# Patient Record
Sex: MALE | Race: WHITE | NOT HISPANIC OR LATINO | Employment: OTHER | URBAN - METROPOLITAN AREA
[De-identification: names, ages, dates, MRNs, and addresses within clinical notes are randomized per-mention and may not be internally consistent; named-entity substitution may affect disease eponyms.]

---

## 2023-08-29 ENCOUNTER — HOSPITAL ENCOUNTER (OUTPATIENT)
Dept: DATA CONVERSION | Facility: HOSPITAL | Age: 63
Discharge: HOME | End: 2023-08-29

## 2023-08-29 DIAGNOSIS — M17.11 UNILATERAL PRIMARY OSTEOARTHRITIS, RIGHT KNEE: ICD-10-CM

## 2023-08-29 LAB
ANION GAP SERPL CALCULATED.3IONS-SCNC: 9 MMOL/L (ref 0–19)
BACTERIA UR QL AUTO: NEGATIVE
BASOPHILS # BLD AUTO: 0.06 K/UL (ref 0–0.22)
BASOPHILS NFR BLD AUTO: 0.7 % (ref 0–1)
BILIRUB UR QL STRIP.AUTO: NEGATIVE
BUN SERPL-MCNC: 10 MG/DL (ref 8–25)
BUN/CREAT SERPL: 10 RATIO (ref 8–21)
CALCIUM SERPL-MCNC: 10.3 MG/DL (ref 8.5–10.4)
CHLORIDE SERPL-SCNC: 101 MMOL/L (ref 97–107)
CLARITY UR: CLEAR
CO2 SERPL-SCNC: 30 MMOL/L (ref 24–31)
COLOR UR: YELLOW
CREAT SERPL-MCNC: 1 MG/DL (ref 0.4–1.6)
DEPRECATED RDW RBC AUTO: 44 FL (ref 37–54)
DIFFERENTIAL METHOD BLD: ABNORMAL
EOSINOPHIL # BLD AUTO: 0.13 K/UL (ref 0–0.45)
EOSINOPHIL NFR BLD: 1.5 % (ref 0–3)
ERYTHROCYTE [DISTWIDTH] IN BLOOD BY AUTOMATED COUNT: 12.5 % (ref 11.7–15)
GFR SERPL CREATININE-BSD FRML MDRD: 84 ML/MIN/1.73 M2
GLUCOSE SERPL-MCNC: 97 MG/DL (ref 65–99)
GLUCOSE UR STRIP.AUTO-MCNC: NEGATIVE MG/DL
HBA1C MFR BLD: 5.8 % (ref 4–6)
HCT VFR BLD AUTO: 48.2 % (ref 41–50)
HGB BLD-MCNC: 16 GM/DL (ref 13.5–16.5)
HGB UR QL STRIP.AUTO: 1 /HPF (ref 0–3)
HGB UR QL: NEGATIVE
HYALINE CASTS UR QL AUTO: NORMAL /LPF
IMM GRANULOCYTES # BLD AUTO: 0.1 K/UL (ref 0–0.1)
KETONES UR QL STRIP.AUTO: NEGATIVE
LEUKOCYTE ESTERASE UR QL STRIP.AUTO: NEGATIVE
LYMPHOCYTES # BLD AUTO: 2.79 K/UL (ref 1.2–3.2)
LYMPHOCYTES NFR BLD MANUAL: 31.1 % (ref 20–40)
MCH RBC QN AUTO: 31.6 PG (ref 26–34)
MCHC RBC AUTO-ENTMCNC: 33.2 % (ref 31–37)
MCV RBC AUTO: 95.3 FL (ref 80–100)
MICROSCOPIC (UA): NORMAL
MONOCYTES # BLD AUTO: 1.07 K/UL (ref 0–0.8)
MONOCYTES NFR BLD MANUAL: 11.9 % (ref 0–8)
MRSA DNA SPEC QL NAA+PROBE: NEGATIVE
NEUTROPHILS # BLD AUTO: 4.81 K/UL
NEUTROPHILS # BLD AUTO: 4.81 K/UL (ref 1.8–7.7)
NEUTROPHILS.IMMATURE NFR BLD: 1.1 % (ref 0–1)
NEUTS SEG NFR BLD: 53.7 % (ref 50–70)
NITRITE UR QL STRIP.AUTO: NEGATIVE
NRBC BLD-RTO: 0 /100 WBC
PH UR STRIP.AUTO: 7 [PH] (ref 4.6–8)
PLATELET # BLD AUTO: 249 K/UL (ref 150–450)
PMV BLD AUTO: 10 CU (ref 7–12.6)
POTASSIUM SERPL-SCNC: 4.7 MMOL/L (ref 3.4–5.1)
PROT UR STRIP.AUTO-MCNC: NEGATIVE MG/DL
RBC # BLD AUTO: 5.06 M/UL (ref 4.5–5.5)
SODIUM SERPL-SCNC: 140 MMOL/L (ref 133–145)
SP GR UR STRIP.AUTO: 1.01 (ref 1–1.03)
SPECIMEN SOURCE: NORMAL
SQUAMOUS UR QL AUTO: NORMAL /HPF
URINE CULTURE: NORMAL
UROBILINOGEN UR QL STRIP.AUTO: NORMAL MG/DL (ref 0–1)
WBC # BLD AUTO: 9 K/UL (ref 4.5–11)
WBC #/AREA URNS AUTO: NORMAL /HPF (ref 0–3)

## 2023-09-04 PROBLEM — B49 FUNGAL INFECTION: Status: ACTIVE | Noted: 2023-09-04

## 2023-09-04 PROBLEM — R52 PAIN: Status: ACTIVE | Noted: 2023-09-04

## 2023-09-04 PROBLEM — E78.5 HYPERLIPIDEMIA: Status: ACTIVE | Noted: 2023-09-04

## 2023-09-04 PROBLEM — N52.9 ERECTILE DYSFUNCTION: Status: ACTIVE | Noted: 2023-09-04

## 2023-09-04 PROBLEM — R06.89 IMPAIRED GAS EXCHANGE: Status: ACTIVE | Noted: 2023-09-04

## 2023-09-04 PROBLEM — E87.8 FLUID VOLUME DISORDER: Status: ACTIVE | Noted: 2023-09-04

## 2023-09-04 PROBLEM — K21.9 GASTROESOPHAGEAL REFLUX DISEASE: Status: ACTIVE | Noted: 2023-09-04

## 2023-09-04 PROBLEM — M25.531 RIGHT WRIST PAIN: Status: ACTIVE | Noted: 2023-09-04

## 2023-09-04 PROBLEM — I48.92 ATRIAL FLUTTER (MULTI): Status: ACTIVE | Noted: 2023-09-04

## 2023-09-04 PROBLEM — R00.2 PALPITATIONS: Status: ACTIVE | Noted: 2023-09-04

## 2023-09-04 PROBLEM — M25.569 JOINT PAIN, KNEE: Status: ACTIVE | Noted: 2023-09-04

## 2023-09-04 PROBLEM — M17.10 OSTEOARTHROSIS, LOCALIZED, PRIMARY, KNEE: Status: ACTIVE | Noted: 2023-09-04

## 2023-09-04 PROBLEM — Z86.0100 HISTORY OF COLONIC POLYPS: Status: ACTIVE | Noted: 2023-09-04

## 2023-09-04 PROBLEM — M25.819 SHOULDER IMPINGEMENT: Status: ACTIVE | Noted: 2023-09-04

## 2023-09-04 PROBLEM — I45.10 RIGHT BUNDLE BRANCH BLOCK: Status: ACTIVE | Noted: 2023-09-04

## 2023-09-04 PROBLEM — Z86.010 HISTORY OF COLONIC POLYPS: Status: ACTIVE | Noted: 2023-09-04

## 2023-09-04 PROBLEM — I10 HYPERTENSION: Status: ACTIVE | Noted: 2023-09-04

## 2023-09-04 PROBLEM — M18.9 CMC ARTHRITIS, THUMB, DEGENERATIVE: Status: ACTIVE | Noted: 2023-09-04

## 2023-09-04 PROBLEM — M17.0 PRIMARY OSTEOARTHRITIS OF BOTH KNEES: Status: ACTIVE | Noted: 2023-09-04

## 2023-09-04 RX ORDER — ZINC GLUCONATE 50 MG
1 TABLET ORAL DAILY
COMMUNITY
End: 2023-12-21 | Stop reason: WASHOUT

## 2023-09-04 RX ORDER — LOSARTAN POTASSIUM AND HYDROCHLOROTHIAZIDE 12.5; 5 MG/1; MG/1
1 TABLET ORAL DAILY
COMMUNITY

## 2023-09-04 RX ORDER — VITAMIN E MIXED 400 UNIT
1 CAPSULE ORAL DAILY
COMMUNITY
End: 2023-12-21 | Stop reason: WASHOUT

## 2023-09-04 RX ORDER — IBUPROFEN 100 MG/5ML
1 SUSPENSION, ORAL (FINAL DOSE FORM) ORAL DAILY
COMMUNITY

## 2023-09-04 RX ORDER — TRIAMCINOLONE ACETONIDE 40 MG/ML
INJECTION, SUSPENSION INTRA-ARTICULAR; INTRAMUSCULAR
COMMUNITY
Start: 2016-04-06 | End: 2023-12-21 | Stop reason: WASHOUT

## 2023-09-04 RX ORDER — SILDENAFIL 50 MG/1
1 TABLET, FILM COATED ORAL DAILY PRN
COMMUNITY
Start: 2020-03-06 | End: 2023-11-27

## 2023-09-04 RX ORDER — UREA 10 %
1 LOTION (ML) TOPICAL DAILY
COMMUNITY
End: 2023-12-21 | Stop reason: WASHOUT

## 2023-09-04 RX ORDER — UBIQUINOL 100 MG
1 CAPSULE ORAL DAILY
COMMUNITY
End: 2023-12-21 | Stop reason: WASHOUT

## 2023-09-04 RX ORDER — LORATADINE 10 MG/1
TABLET ORAL
COMMUNITY
Start: 2014-10-16 | End: 2023-12-21 | Stop reason: WASHOUT

## 2023-09-04 RX ORDER — DABIGATRAN ETEXILATE 150 MG/1
1 CAPSULE ORAL 2 TIMES DAILY
COMMUNITY
End: 2023-12-21 | Stop reason: WASHOUT

## 2023-09-04 RX ORDER — MULTIVITAMIN
1 TABLET ORAL DAILY
COMMUNITY
End: 2024-03-05 | Stop reason: ALTCHOICE

## 2023-09-04 RX ORDER — EPINEPHRINE 0.22MG
1 AEROSOL WITH ADAPTER (ML) INHALATION DAILY
COMMUNITY
End: 2023-12-21 | Stop reason: WASHOUT

## 2023-09-04 RX ORDER — PHENOL/SODIUM PHENOLATE
1 AEROSOL, SPRAY (ML) MUCOUS MEMBRANE DAILY
COMMUNITY
End: 2023-12-21 | Stop reason: WASHOUT

## 2023-09-04 RX ORDER — MELOXICAM 15 MG/1
1 TABLET ORAL DAILY PRN
COMMUNITY
End: 2024-01-24

## 2023-09-04 RX ORDER — CLOBETASOL PROPIONATE 0.5 MG/G
CREAM TOPICAL 2 TIMES DAILY
COMMUNITY
Start: 2023-07-20 | End: 2023-12-21 | Stop reason: WASHOUT

## 2023-09-04 RX ORDER — CLOTRIMAZOLE AND BETAMETHASONE DIPROPIONATE 10; .64 MG/G; MG/G
1 CREAM TOPICAL 2 TIMES DAILY
COMMUNITY
Start: 2019-11-18 | End: 2023-12-21 | Stop reason: WASHOUT

## 2023-09-04 RX ORDER — DESONIDE 0.5 MG/G
1 CREAM TOPICAL 2 TIMES DAILY PRN
COMMUNITY
End: 2023-12-21 | Stop reason: WASHOUT

## 2023-09-04 RX ORDER — TERBINAFINE HYDROCHLORIDE 250 MG/1
1 TABLET ORAL DAILY
COMMUNITY
Start: 2022-12-21 | End: 2023-12-21 | Stop reason: WASHOUT

## 2023-09-04 RX ORDER — CICLOPIROX 80 MG/ML
SOLUTION TOPICAL
COMMUNITY
End: 2023-12-21 | Stop reason: WASHOUT

## 2023-09-04 RX ORDER — OMEPRAZOLE 20 MG/1
1 CAPSULE, DELAYED RELEASE ORAL EVERY OTHER DAY
COMMUNITY
End: 2024-03-13

## 2023-09-04 RX ORDER — ACETAMINOPHEN 500 MG
1 TABLET ORAL DAILY
COMMUNITY
End: 2023-12-21 | Stop reason: WASHOUT

## 2023-09-04 RX ORDER — ATORVASTATIN CALCIUM 10 MG/1
1 TABLET, FILM COATED ORAL DAILY
COMMUNITY
Start: 2012-03-15 | End: 2024-01-24

## 2023-09-11 ENCOUNTER — HOSPITAL ENCOUNTER (OUTPATIENT)
Dept: DATA CONVERSION | Facility: HOSPITAL | Age: 63
Discharge: HOME HEALTH | End: 2023-09-12
Payer: MEDICARE

## 2023-09-11 DIAGNOSIS — I48.92 UNSPECIFIED ATRIAL FLUTTER (MULTI): ICD-10-CM

## 2023-09-11 DIAGNOSIS — K21.9 GASTRO-ESOPHAGEAL REFLUX DISEASE WITHOUT ESOPHAGITIS: ICD-10-CM

## 2023-09-11 DIAGNOSIS — E78.5 HYPERLIPIDEMIA, UNSPECIFIED: ICD-10-CM

## 2023-09-11 DIAGNOSIS — M17.11 UNILATERAL PRIMARY OSTEOARTHRITIS, RIGHT KNEE: ICD-10-CM

## 2023-09-11 DIAGNOSIS — I10 ESSENTIAL (PRIMARY) HYPERTENSION: ICD-10-CM

## 2023-09-12 LAB
ANION GAP SERPL CALCULATED.3IONS-SCNC: 10 MMOL/L (ref 0–19)
BASOPHILS # BLD AUTO: 0.04 K/UL (ref 0–0.22)
BASOPHILS NFR BLD AUTO: 0.3 % (ref 0–1)
BUN SERPL-MCNC: 13 MG/DL (ref 8–25)
BUN/CREAT SERPL: 13 RATIO (ref 8–21)
CALCIUM SERPL-MCNC: 8.4 MG/DL (ref 8.5–10.4)
CHLORIDE SERPL-SCNC: 107 MMOL/L (ref 97–107)
CO2 SERPL-SCNC: 22 MMOL/L (ref 24–31)
CREAT SERPL-MCNC: 1 MG/DL (ref 0.4–1.6)
DEPRECATED RDW RBC AUTO: 44.4 FL (ref 37–54)
DIFFERENTIAL METHOD BLD: ABNORMAL
EOSINOPHIL # BLD AUTO: 0.07 K/UL (ref 0–0.45)
EOSINOPHIL NFR BLD: 0.6 % (ref 0–3)
ERYTHROCYTE [DISTWIDTH] IN BLOOD BY AUTOMATED COUNT: 12.6 % (ref 11.7–15)
GFR SERPL CREATININE-BSD FRML MDRD: 84 ML/MIN/1.73 M2
GLUCOSE SERPL-MCNC: 98 MG/DL (ref 65–99)
HCT VFR BLD AUTO: 38.1 % (ref 41–50)
HGB BLD-MCNC: 12.6 GM/DL (ref 13.5–16.5)
IMM GRANULOCYTES # BLD AUTO: 0.1 K/UL (ref 0–0.1)
LYMPHOCYTES # BLD AUTO: 2.07 K/UL (ref 1.2–3.2)
LYMPHOCYTES NFR BLD MANUAL: 17.9 % (ref 20–40)
MCH RBC QN AUTO: 31.7 PG (ref 26–34)
MCHC RBC AUTO-ENTMCNC: 33.1 % (ref 31–37)
MCV RBC AUTO: 95.7 FL (ref 80–100)
MONOCYTES # BLD AUTO: 1.28 K/UL (ref 0–0.8)
MONOCYTES NFR BLD MANUAL: 11.1 % (ref 0–8)
NEUTROPHILS # BLD AUTO: 7.99 K/UL
NEUTROPHILS # BLD AUTO: 7.99 K/UL (ref 1.8–7.7)
NEUTROPHILS.IMMATURE NFR BLD: 0.9 % (ref 0–1)
NEUTS SEG NFR BLD: 69.2 % (ref 50–70)
NRBC BLD-RTO: 0 /100 WBC
PLATELET # BLD AUTO: 207 K/UL (ref 150–450)
PMV BLD AUTO: 10.3 CU (ref 7–12.6)
POTASSIUM SERPL-SCNC: 4.4 MMOL/L (ref 3.4–5.1)
RBC # BLD AUTO: 3.98 M/UL (ref 4.5–5.5)
SODIUM SERPL-SCNC: 139 MMOL/L (ref 133–145)
WBC # BLD AUTO: 11.6 K/UL (ref 4.5–11)

## 2023-10-02 ENCOUNTER — EVALUATION (OUTPATIENT)
Dept: PHYSICAL THERAPY | Facility: CLINIC | Age: 63
End: 2023-10-02
Payer: COMMERCIAL

## 2023-10-02 DIAGNOSIS — M25.561 ARTHRALGIA OF RIGHT KNEE: ICD-10-CM

## 2023-10-02 DIAGNOSIS — M17.11 PRIMARY LOCALIZED OSTEOARTHROSIS OF RIGHT LOWER LEG: Primary | ICD-10-CM

## 2023-10-02 PROCEDURE — 97140 MANUAL THERAPY 1/> REGIONS: CPT | Mod: GP | Performed by: PHYSICAL THERAPIST

## 2023-10-02 PROCEDURE — 97161 PT EVAL LOW COMPLEX 20 MIN: CPT | Mod: GP | Performed by: PHYSICAL THERAPIST

## 2023-10-02 PROCEDURE — 97110 THERAPEUTIC EXERCISES: CPT | Mod: GP | Performed by: PHYSICAL THERAPIST

## 2023-10-02 ASSESSMENT — ENCOUNTER SYMPTOMS
LOSS OF SENSATION IN FEET: 0
OCCASIONAL FEELINGS OF UNSTEADINESS: 0
DEPRESSION: 0

## 2023-10-02 ASSESSMENT — PAIN - FUNCTIONAL ASSESSMENT: PAIN_FUNCTIONAL_ASSESSMENT: 0-10

## 2023-10-02 NOTE — Clinical Note
October 2, 2023     Patient: Leonel Ceballos   YOB: 1960   Date of Visit: 10/2/2023       To Whom it May Concern:    Leonel Ceballos was seen in my clinic on 10/2/2023. He {Return to school/sport:36001}.    If you have any questions or concerns, please don't hesitate to call.         Sincerely,          Osei Patton, PT        CC: No Recipients

## 2023-10-02 NOTE — Clinical Note
October 2, 2023     Patient: Leonel Ceballos   YOB: 1960   Date of Visit: 10/2/2023       To Whom It May Concern:    It is my medical opinion that Leonel Ceballos {Work release (duty restriction):31973}.    If you have any questions or concerns, please don't hesitate to call.         Sincerely,        Osei Patton, PT    CC: No Recipients

## 2023-10-03 NOTE — PROGRESS NOTES
"Physical Therapy Evaluation    Patient Name: Leonel Ceballos  MRN: [id number]  Today's Date: 10/2/2023  Time Calculation  Start Time: 1025  Stop Time: 1105  Time Calculation (min): 40 min    Assessment  Rehab Prognosis: Good  Assessment Comment: Patient is 63 y.o. year old who presents to physical therapy with signs and symptoms consistent with R knee pain s/p TKA. Patient is progressing well from home physical therapy but continues to have decreased ROM and strength limiting functional mobility and ADLs. Pt would benefit from skilled physical therapy in order to address the stated deficits and return to daily tasks with reduced pain and improved function.    SINSS:  Severity: Moderate  Irritability: Moderate  Nature: MSK: Knee  Stage: Sub-Acute    Plan  Treatment/Interventions: Education/ Instruction, Electrical stimulation, Gait training, Manual therapy, Neuromuscular re-education, Self care/ home management, Therapeutic activities, Therapeutic exercises, Vasopneumatic device  PT Plan: Skilled PT  PT Frequency: 2 times per week  Duration: 8 weeks  Onset Date: 01/01/23  Certification Period Start Date: 10/02/23  Certification Period End Date: 11/27/23  Rehab Potential: Good  Plan of Care Agreement: Patient        Subjective   General:  General  Reason for Referral: R knee pain s/p R TKA  Referred By: Dr. Rodgers  General Comment: Pt reports that he had long standing knee OA that had been slowly progressing over the years. He notes that ~2 years ago the pain progressively worsened and during that time he had cortisone injections and gel injections. Ambulation with single-point cane on uneven surfaces and community.  Precautions:  Precautions  STEADI Fall Risk Score (The score of 4 or more indicates an increased risk of falling): 2  Precautions Comment: Ambulating with single-point cane    Pain:  Pain Assessment: 0-10  Pain Location: Knee (#1 (I,V): R knee diffusely, 0-5/10, \"aching\")    Objective     KNEE    Knee " Observation  Observation Comment: Generalized Joint swelling diffusely; Girth: Superior patella L: 38cm, R: 41.5cm; 15cm distal joint line: L: 38cm, R: 36cm       Knee AROM WFL unless documented below  R knee flexion: (140°): 72°  L knee flexion: (140°): 132°  R knee extension: (0°): 5°  L knee extension: (0°): 0°  Knee PROM WFL unless documented below  R knee flexion: (140°): 85°  R knee extension: (0°): 0°  Knee MMT WFL unless documented below  R knee flexion: (5/5): 4+/5  L knee flexion: (5/5): 5/5  R knee extension: (5/5): 4-/5  L knee extension: (5/5): 5/5    Outcome Measures:  Other Measures  Lower Extremity Funtional Score (LEFS): 35/80 (43.75%)     OP EDUCATION:  Education  Individual(s) Educated: Patient  Education Comment: Reviewed HEP from home health    Goals:  STG: (Expected End: 10/30/2023)  1) Patient will improve Oswestry Low Back Disability Questionnaire by 10% in order to indicate a measurable improvement in daily function and ability to complete daily tasks.  2) Patient will be able to complete ADLs with pain less than 4/10 in lumbar region.  3) Patient will have 75% of normal lumbar ROM in order to allow for proper bathing and dressing.   4) Patient will be independent with HEP to allow for continued improvement in daily tasks at home and in the community in 3 visits.   LTG: (Expected End: 11/27/2023)  1) Patient will have 5/5 strength in lateral hip musculature to aid in stability with ambulation on varied surfaces in community.  2) Patient will be able to perform proper squatting technique and sitting postures in order to prevent increased pain with daily tasks.  3) Patient will be able to perform >30 seconds of SLS on even ground in order to allow for safe ambulation and to demonstrate reduced fall risk within the community.   4) Patient will improve Oswestry Low Back Disability Questionnaire to </=15% in order to indicate a measurable improvement in daily function and ability to complete  daily tasks.

## 2023-10-06 ENCOUNTER — TREATMENT (OUTPATIENT)
Dept: PHYSICAL THERAPY | Facility: CLINIC | Age: 63
End: 2023-10-06
Payer: COMMERCIAL

## 2023-10-06 DIAGNOSIS — M25.561 ARTHRALGIA OF RIGHT KNEE: ICD-10-CM

## 2023-10-06 DIAGNOSIS — M17.11 PRIMARY LOCALIZED OSTEOARTHROSIS OF RIGHT LOWER LEG: Primary | ICD-10-CM

## 2023-10-06 PROCEDURE — 97112 NEUROMUSCULAR REEDUCATION: CPT | Mod: GP | Performed by: PHYSICAL THERAPIST

## 2023-10-06 PROCEDURE — 97110 THERAPEUTIC EXERCISES: CPT | Mod: GP | Performed by: PHYSICAL THERAPIST

## 2023-10-06 PROCEDURE — 97140 MANUAL THERAPY 1/> REGIONS: CPT | Mod: GP | Performed by: PHYSICAL THERAPIST

## 2023-10-06 ASSESSMENT — PAIN - FUNCTIONAL ASSESSMENT: PAIN_FUNCTIONAL_ASSESSMENT: 0-10

## 2023-10-06 ASSESSMENT — PAIN SCALES - GENERAL: PAINLEVEL_OUTOF10: 2

## 2023-10-06 NOTE — PROGRESS NOTES
Physical Therapy Treatment    Patient Name: Leonel Ceballos  MRN: [id number]  Today's Date: 10/6/2023     Current Problem  No diagnosis found.    Insurance:  Number of Treatments Authorized: 2 of med nec          Subjective   General  Reason for Referral: R knee pain s/p R TKA  Referred By: Dr. Rodgers  General Comment: Pt states that he's feeling pretty good overall today with some slight soreness and pain in the knee.  Precautions  Precautions  Precautions Comment: Ambulating with single-point cane  Pain  Pain Assessment: 0-10  Pain Score: 2    Objective     Knee AROM WFL unless documented below  R knee flexion: (140°): 82°  R knee extension: (0°): 3°  Knee PROM WFL unless documented below  R knee flexion: (140°): 85°  R knee extension: (0°): 0°    Treatments:    Therapeutic Exercise  Therapeutic Exercise Activity 1: SciFit (Seat 13), L1, 5 mins  Therapeutic Exercise Activity 2: Dynamics: High knees, Butt kicks, tin soldiers, hip openers, hip closers, x 40 feet each  Therapeutic Exercise Activity 3: PROM - R knee flexion/extension  Therapeutic Exercise Activity 4: Gastroc Stretch, incline, 3x30 secs  Therapeutic Exercise Activity 5: HS Stretch, foot on stool, 3x30 sec    Balance/Neuromuscular Re-Education  Balance/Neuromuscular Re-Education Activity 1: Tiltboard balance M/L 5x30 secs  Balance/Neuromuscular Re-Education Activity 2: SLS, EO, Firm    Manual Therapy  Manual Therapy Activity 1: Patellar mobilizations, All glides, Gr. III in supine    Assessment:  PT Assessment  Rehab Prognosis: Good  Assessment Comment: Patient with good tolerance to treatment today.  Demonstrating increased range of motion and tolerance to functional tasks.  Continues to be challenged with balance and CKC activities such as squats.  Experienced episode of lightheadedness during Total Gym but subsided with water and rest for approximately 2 to 3 minutes.  Was able to leave the clinic independently without assistance.  Will monitor  response to exercises in future sessions and adjust plan of care to continue to progress deficits to return to prior functional level for ADLs.    Plan:  OP PT Plan  PT Plan: Skilled PT (Progress per POC. Advance LE strengthening and ROM tasks for knee and hip)  Number of Treatments Authorized: 2 of med nec      Osei Patton, PT

## 2023-10-10 ENCOUNTER — TREATMENT (OUTPATIENT)
Dept: PHYSICAL THERAPY | Facility: CLINIC | Age: 63
End: 2023-10-10
Payer: COMMERCIAL

## 2023-10-10 DIAGNOSIS — M25.561 ARTHRALGIA OF RIGHT KNEE: Primary | ICD-10-CM

## 2023-10-10 PROCEDURE — 97110 THERAPEUTIC EXERCISES: CPT | Mod: GP | Performed by: PHYSICAL MEDICINE & REHABILITATION

## 2023-10-10 PROCEDURE — 97016 VASOPNEUMATIC DEVICE THERAPY: CPT | Mod: GP | Performed by: PHYSICAL MEDICINE & REHABILITATION

## 2023-10-10 PROCEDURE — 97140 MANUAL THERAPY 1/> REGIONS: CPT | Mod: GP | Performed by: PHYSICAL MEDICINE & REHABILITATION

## 2023-10-10 NOTE — PROGRESS NOTES
"  Physical Therapy Treatment    Patient Name: Leonel Ceballos  MRN: [id number]  Today's Date: 10/10/2023  Time Calculation  Start Time: 1205  Stop Time: 1255  Time Calculation (min): 50 min  Current Problem  1. Arthralgia of right knee            Insurance:  Visit number: 3 of MN  Authorization info: MN  Insurance Type: [facility]    Subjective   General  Reason for Referral: R knee pain s/p R TKA  Referred By: Dr. Rodgers  General Comment: Patient reports feeling some aching and soreness over the lateral surface of his right knee. He notes that this began about two days ago. No new complaints otherwise.      Performing HEP?: Yes    Precautions  Precautions  Precautions Comment: Ambulating with single-point cane    Objective   KNEE    Knee AROM WFL unless documented below  R knee flexion: (140°): 85°  R knee extension: (0°): 2°    Treatments:  Therapeutic Exercise  Therapeutic Exercise Activity 1: SciFit (Seat 13), L1, 5 mins  Therapeutic Exercise Activity 2: Gastroc Stretch, incline, 3x30 secs  Therapeutic Exercise Activity 3: Supine Heel Slides: x30 w strap  Therapeutic Exercise Activity 4: Supine quad set with heel prop: 20x5\" holds  Therapeutic Exercise Activity 5: SLR w quad set: 3x10  Therapeutic Exercise Activity 6: DL Leg Press: 2x10 w 60#  Therapeutic Exercise Activity 7: SL Leg Press: 2x10 w 30#    Manual Therapy  Manual Therapy Activity 1: Tibiofemoral Mobilization for knee flexion: Grade III  Manual Therapy Activity 2: IASTM to R quadricep and lateral knee    Modalities  Modality 1: Untimed Vasopneumatic    Assessment:  PT Assessment  Rehab Prognosis: Good  Assessment Comment: Today's session focused on progressing activities to advance patient's right knee strength, AROM and soft tissue mobility. Patient demonstrated good effort toward today's progressions. No increased pain or adverse responses noted at the conclusion of the session. Overall response toward today's interventions was good.    Plan:   Continue with " current POC. Progress AROM and strength as tolerated.      Andrew Ramsay, PT

## 2023-10-13 ENCOUNTER — TREATMENT (OUTPATIENT)
Dept: PHYSICAL THERAPY | Facility: CLINIC | Age: 63
End: 2023-10-13
Payer: COMMERCIAL

## 2023-10-13 DIAGNOSIS — M17.11 PRIMARY LOCALIZED OSTEOARTHROSIS OF RIGHT LOWER LEG: Primary | ICD-10-CM

## 2023-10-13 DIAGNOSIS — M25.561 ARTHRALGIA OF RIGHT KNEE: ICD-10-CM

## 2023-10-13 PROCEDURE — 97112 NEUROMUSCULAR REEDUCATION: CPT | Mod: GP | Performed by: PHYSICAL THERAPIST

## 2023-10-13 PROCEDURE — 97016 VASOPNEUMATIC DEVICE THERAPY: CPT | Mod: GP | Performed by: PHYSICAL THERAPIST

## 2023-10-13 PROCEDURE — 97110 THERAPEUTIC EXERCISES: CPT | Mod: GP | Performed by: PHYSICAL THERAPIST

## 2023-10-13 ASSESSMENT — PAIN SCALES - GENERAL: PAINLEVEL_OUTOF10: 2

## 2023-10-13 ASSESSMENT — PAIN - FUNCTIONAL ASSESSMENT: PAIN_FUNCTIONAL_ASSESSMENT: 0-10

## 2023-10-13 NOTE — PROGRESS NOTES
Physical Therapy Treatment    Patient Name: Leonel Ceballos  MRN: [id number]  Today's Date: 10/13/2023  Time Calculation  Start Time: 1235  Stop Time: 1330  Time Calculation (min): 55 min    Current Problem  1. Primary localized osteoarthrosis of right lower leg        2. Arthralgia of right knee            Insurance:  Number of Treatments Authorized: 4 of Med nec        Payor: [employer] DUAL COMPLETE / Plan: [employer] DUAL COMPLETE / Product Type: *No Product type* /     Subjective   General  Reason for Referral: R knee pain s/p R TKA  Referred By: Dr. Rodgers  General Comment: Pt notes that he continues to have improvement overall in tolerance to functional tasks in WBing but is still having intermittent soreness and pain when trying to sleep at night.    Performing HEP?: Yes    Precautions  Precautions  Precautions Comment: Ambulating with single-point cane  Pain  Pain Assessment: 0-10  Pain Score: 2    Objective   KNEE    Knee AROM WFL unless documented below  R knee flexion: (140°): 102°  R knee extension: (0°): 2°    Treatments:    Therapeutic Exercise  Therapeutic Exercise Activity 1: SciFit (Seat 13), L1, 5 mins  Therapeutic Exercise Activity 2: Gastroc Stretch, incline, 3x30 secs  Therapeutic Exercise Activity 3: Dynamics: High knees, Butt kicks, tin soldiers, hip openers, hip closers, x 40 feet each  Therapeutic Exercise Activity 4: DL Leg Press: 2x10 w 60#  Therapeutic Exercise Activity 5: SL Leg Press: 2x10 w 30#  Therapeutic Exercise Activity 6: PROM R knee flexion/extension    Balance/Neuromuscular Re-Education  Balance/Neuromuscular Re-Education Activity 1: Tiltboard balance M/L 5x30 secs  Balance/Neuromuscular Re-Education Activity 2: SLS L, EO,AirEx, 15 secs x 10  Balance/Neuromuscular Re-Education Activity 3: R/L lateral steps with green loop x 3 laps (15 steps each direction = 1 lap)  Balance/Neuromuscular Re-Education Activity 4: F/B diagonal steps with green loop x 3 laps (15 steps  each direction = 1 lap)    Manual Therapy  Manual Therapy Activity 1: Patellar mobilizations, All glides, Gr. III in supine      Modalities  Modality 1: Untimed Vasopneumatic (GameReady, 34°, moderate pressure, bell given)    Assessment:  PT Assessment  Rehab Prognosis: Good  Assessment Comment: Patient with good tolerance to session today despite being challenged with strengthening and weightbearing activities.  Patient with increased fatigue and soreness (5-6/10) noted along with decreased pace during exercises as session progressed.  Positive response to vasopneumatic with cryotherapy at end of session.  We will continue to challenge CKC strength and balance in bilateral and unilateral positions to assist with return to ADLs.    Plan:     PT Plan: Skilled PT (Progress per POC. Advance LE strengthening and ROM tasks for knee and hip)      Osei Patton, PT

## 2023-10-17 ENCOUNTER — TREATMENT (OUTPATIENT)
Dept: PHYSICAL THERAPY | Facility: CLINIC | Age: 63
End: 2023-10-17
Payer: COMMERCIAL

## 2023-10-17 DIAGNOSIS — M17.11 PRIMARY LOCALIZED OSTEOARTHROSIS OF RIGHT LOWER LEG: Primary | ICD-10-CM

## 2023-10-17 DIAGNOSIS — M25.561 ARTHRALGIA OF RIGHT KNEE: ICD-10-CM

## 2023-10-17 DIAGNOSIS — M17.0 PRIMARY OSTEOARTHRITIS OF BOTH KNEES: ICD-10-CM

## 2023-10-17 PROCEDURE — 97110 THERAPEUTIC EXERCISES: CPT | Mod: GP | Performed by: PHYSICAL MEDICINE & REHABILITATION

## 2023-10-17 PROCEDURE — 97016 VASOPNEUMATIC DEVICE THERAPY: CPT | Mod: GP | Performed by: PHYSICAL MEDICINE & REHABILITATION

## 2023-10-17 PROCEDURE — 97112 NEUROMUSCULAR REEDUCATION: CPT | Mod: GP | Performed by: PHYSICAL MEDICINE & REHABILITATION

## 2023-10-17 ASSESSMENT — PAIN - FUNCTIONAL ASSESSMENT: PAIN_FUNCTIONAL_ASSESSMENT: 0-10

## 2023-10-17 ASSESSMENT — PAIN SCALES - GENERAL: PAINLEVEL_OUTOF10: 2

## 2023-10-17 NOTE — PROGRESS NOTES
"  Physical Therapy Treatment    Patient Name: Leonel Ceballos  MRN: [id number]  Today's Date: 10/17/2023  Time Calculation  Start Time: 1100  Stop Time: 1155  Time Calculation (min): 55 min  Current Problem  1. Primary localized osteoarthrosis of right lower leg        2. Arthralgia of right knee        3. Primary osteoarthritis of both knees            Insurance:  Visit number: 5 of MN    Subjective   General  Reason for Referral: R knee pain s/p R TKA  Referred By: Dr. Rodgers  General Comment: Patient reports some soreness and achiness in his left knee this visit. No new complaints otherwise.    Performing HEP?: Yes    Precautions  Precautions  Precautions Comment: Ambulating with single-point cane  Pain  Pain Assessment: 0-10  Pain Score: 2    Objective   KNEE    Knee AROM  R knee flexion: (140°): 107°  R knee extension: (0°): 1° (0 post-treatment)    Treatments:  Therapeutic Exercise  Therapeutic Exercise Activity 1: SciFit (Seat 13), L3, 5 mins  Therapeutic Exercise Activity 2: Gastroc Stretch, incline, 3x30 secs  Therapeutic Exercise Activity 3: Knee Flexion Stretch on 12\" box: 20x5\" holds  Therapeutic Exercise Activity 4: Supine Heel Slides: x30 w strap  Therapeutic Exercise Activity 5: Supine quad set with heel prop: 20x5\" holds  Therapeutic Exercise Activity 6: DL Leg Press: 3x10 w 65#  Therapeutic Exercise Activity 7: SL Leg Press: 2x10 w 30#    Balance/Neuromuscular Re-Education  Balance/Neuromuscular Re-Education Activity 1: Tiltboard balance M/L 5x30 secs    Manual Therapy  Manual Therapy Activity 1: Tibiofemoral Mobilization for knee flexion: Grade III    Assessment:   Today's visit focused on progressing activities to improve patient's right knee strength, AROM and functional ability. Patient demonstrated good effort toward today's progressions. Improved right knee AROM measured during today's session. No increased pain reported at the conclusion of the session. Overall response toward today's " interventions was great.      Plan:  Continue with current POC. Progress strength and AROM as tolerated.   OP PT Plan  Number of Treatments Authorized: 5 of Med Nec    Goals:  Active       PT Problem       STG       Start:  10/02/23    Expected End:  10/30/23       1) Patient will improve Oswestry Low Back Disability Questionnaire by 10% in order to indicate a measurable improvement in daily function and ability to complete daily tasks.  2) Patient will be able to complete ADLs with pain less than 4/10 in lumbar region.  3) Patient will have 75% of normal lumbar ROM in order to allow for proper bathing and dressing.   4) Patient will be independent with HEP to allow for continued improvement in daily tasks at home and in the community in 3 visits.            LTG       Start:  10/02/23    Expected End:  11/27/23       1) Patient will have 5/5 strength in lateral hip musculature to aid in stability with ambulation on varied surfaces in community.  2) Patient will be able to perform proper squatting technique and sitting postures in order to prevent increased pain with daily tasks.  3) Patient will be able to perform >30 seconds of SLS on even ground in order to allow for safe ambulation and to demonstrate reduced fall risk within the community.   4) Patient will improve Oswestry Low Back Disability Questionnaire to </=15% in order to indicate a measurable improvement in daily function and ability to complete daily tasks.                Andrew Ramsay, PT

## 2023-10-20 ENCOUNTER — TREATMENT (OUTPATIENT)
Dept: PHYSICAL THERAPY | Facility: CLINIC | Age: 63
End: 2023-10-20
Payer: COMMERCIAL

## 2023-10-20 ENCOUNTER — OFFICE VISIT (OUTPATIENT)
Dept: ORTHOPEDIC SURGERY | Facility: CLINIC | Age: 63
End: 2023-10-20
Payer: MEDICARE

## 2023-10-20 VITALS — BODY MASS INDEX: 29.62 KG/M2 | HEIGHT: 69 IN | WEIGHT: 200 LBS

## 2023-10-20 DIAGNOSIS — M17.11 PRIMARY LOCALIZED OSTEOARTHROSIS OF RIGHT LOWER LEG: ICD-10-CM

## 2023-10-20 DIAGNOSIS — M25.561 ARTHRALGIA OF RIGHT KNEE: Primary | ICD-10-CM

## 2023-10-20 DIAGNOSIS — Z96.651 STATUS POST TOTAL KNEE REPLACEMENT USING CEMENT, RIGHT: Primary | ICD-10-CM

## 2023-10-20 DIAGNOSIS — M17.0 PRIMARY OSTEOARTHRITIS OF BOTH KNEES: ICD-10-CM

## 2023-10-20 PROCEDURE — 1159F MED LIST DOCD IN RCRD: CPT | Performed by: ORTHOPAEDIC SURGERY

## 2023-10-20 PROCEDURE — 99024 POSTOP FOLLOW-UP VISIT: CPT | Performed by: ORTHOPAEDIC SURGERY

## 2023-10-20 PROCEDURE — 1160F RVW MEDS BY RX/DR IN RCRD: CPT | Performed by: ORTHOPAEDIC SURGERY

## 2023-10-20 PROCEDURE — 97016 VASOPNEUMATIC DEVICE THERAPY: CPT | Mod: GP | Performed by: PHYSICAL MEDICINE & REHABILITATION

## 2023-10-20 PROCEDURE — 1125F AMNT PAIN NOTED PAIN PRSNT: CPT | Performed by: ORTHOPAEDIC SURGERY

## 2023-10-20 PROCEDURE — 97110 THERAPEUTIC EXERCISES: CPT | Mod: GP | Performed by: PHYSICAL MEDICINE & REHABILITATION

## 2023-10-20 ASSESSMENT — PAIN - FUNCTIONAL ASSESSMENT: PAIN_FUNCTIONAL_ASSESSMENT: 0-10

## 2023-10-20 ASSESSMENT — PAIN SCALES - GENERAL
PAINLEVEL_OUTOF10: 1
PAINLEVEL_OUTOF10: 1

## 2023-10-20 NOTE — PROGRESS NOTES
"  Physical Therapy Treatment    Patient Name: Leonel Ceballos  MRN: [id number]  Today's Date: 10/20/2023  Time Calculation  Start Time: 1320  Stop Time: 1417  Time Calculation (min): 57 min  Current Problem  1. Arthralgia of right knee        2. Primary localized osteoarthrosis of right lower leg        3. Primary osteoarthritis of both knees            Insurance:  Number of Treatments Authorized: (P) 6 of Med Nec    Subjective   General  Reason for Referral: R knee pain s/p R TKA  Referred By: Dr. Rodgers  General Comment: Patient reports to be doing well this visit. He followed up with his surgeon today, who is pleased with his progress thus far.      Performing HEP?: Yes    Precautions  Precautions  Precautions Comment: Ambulating with single-point cane  Pain  Pain Score: 1    Objective   KNEE    Knee AROM   R knee flexion: (140°): 113°  R knee extension: (0°): 1° (0 post-treatment)    Treatments:    Therapeutic Exercise  Therapeutic Exercise Activity 1: SciFit (Seat 13), L3, 5 mins  Therapeutic Exercise Activity 2: Gastroc Stretch, incline, 3x30 secs  Therapeutic Exercise Activity 3: Knee Flexion Stretch on chair: 20x5\" holds  Therapeutic Exercise Activity 4: Supine Heel Slides: x30 w strap  Therapeutic Exercise Activity 5: Supine quad set with heel prop: 20x5\" holds w 10# over knee  Therapeutic Exercise Activity 6: DL Leg Press: 3x10 w 70#  Therapeutic Exercise Activity 7: SL Leg Press: 2x10 w 40#  Therapeutic Exercise Activity 8: Standing TKE: x30         Manual Therapy  Manual Therapy Activity 1: Tibiofemoral Mobilization for knee flexion: Grade III    Therapeutic Activity  Therapeutic Activity 1: Forward Step Up: 2x10 on 6\" box w OKD  Therapeutic Activity 2: Lateral Step Up: 2x10 on 6\" box    Modalities  Modality 1: Untimed Vasopneumatic (GameReady; 34 degrees; bell given)    Assessment:   Today's visit focused on progressing activities to improve patient's right knee AROM, as well as lower extremity strength " and functional ability. Patient demonstrated good effort toward today's progressions. Improved right knee flexion AROM was measured during today's visit. No increased pain reported at the conclusion of the session. Overall response toward today's interventions was great.      Plan:  Continue with current POC. Progress strength and AROM as tolerated.  OP PT Plan  Number of Treatments Authorized: (P) 6 of Med Nec    Goals:  Active       PT Problem       STG       Start:  10/02/23    Expected End:  10/30/23       1) Patient will improve Oswestry Low Back Disability Questionnaire by 10% in order to indicate a measurable improvement in daily function and ability to complete daily tasks.  2) Patient will be able to complete ADLs with pain less than 4/10 in lumbar region.  3) Patient will have 75% of normal lumbar ROM in order to allow for proper bathing and dressing.   4) Patient will be independent with HEP to allow for continued improvement in daily tasks at home and in the community in 3 visits.            LTG       Start:  10/02/23    Expected End:  11/27/23       1) Patient will have 5/5 strength in lateral hip musculature to aid in stability with ambulation on varied surfaces in community.  2) Patient will be able to perform proper squatting technique and sitting postures in order to prevent increased pain with daily tasks.  3) Patient will be able to perform >30 seconds of SLS on even ground in order to allow for safe ambulation and to demonstrate reduced fall risk within the community.   4) Patient will improve Oswestry Low Back Disability Questionnaire to </=15% in order to indicate a measurable improvement in daily function and ability to complete daily tasks.                Andrew Ramsay, PT

## 2023-10-20 NOTE — PROGRESS NOTES
Subjective    Patient ID: Mabel Engel is a 63 y.o. male.    Chief Complaint: Follow-up of the Right Knee (S/P TKA)     Last Surgery: No surgery found  Last Surgery Date: No surgery found    HPI 6 weeks status post right total knee doing well    Objective   Ortho Exam range of motion is 0 to 105 degrees excellent stability noted is well-healed no effusion no distal edema walking well    Image Results:  XR lumbar spine complete 4+ views  PROCEDURE:         SPINE LUMBOSACRAL MIN 4 VIEW - CXR  0034  REASON FOR EXAM: M54.50 LOW BACK PAIN, UNSPECIFIED    RESULT: MRN: [id number]  Patient Name: MABEL ENGEL    STUDY:  SPINE LUMBOSACRAL MIN 4 VIEW; 4/28/2023 10:59 am    INDICATION:  M54.50 LOW BACK PAIN, UNSPECIFIED. Pain    COMPARISON:  None available.    ACCESSION NUMBER(S):  [id number]    ORDERING CLINICIAN:  QUINTON WANG    TECHNIQUE:  Views: AP, lateral,  bilateral oblique  and coned-down L5-S1.    FINDINGS:  RESULT: The alignment is within normal limits. The lumbar vertebrae  demonstrate no evidence for wedge fracture or compression deformity. There  is disc space narrowing with endplate osteophyte formation most pronounced  at the L5-S1 level. The pedicles and sacroiliac joints are unremarkable. No  abnormal soft tissue calcifications are noted.    IMPRESSION:  Degenerative changes of the lumbar spine    Dictation workstation:   [id number]    Original Interpreting Physician:   MICHAELA SIDHU MD  Original Transcribed by/Date: MMODAL Apr 28 2023 10:15A  Original Electronically Signed by/Date: MICHAELA SIDHU MD Apr 28 2023  11:30A    Addendum Interpreting Physician:  Addendum Transcribed by/Date: NO ADDENDUM  Addendum Electronically Signed by/Date:      Assessment/Plan doing very well continue with therapy and we will see him again in 6 weeks  Encounter Diagnoses:  Status post total knee replacement using cement, right    No orders of the defined types were placed in this encounter.    No follow-ups on file.

## 2023-10-24 ENCOUNTER — TREATMENT (OUTPATIENT)
Dept: PHYSICAL THERAPY | Facility: CLINIC | Age: 63
End: 2023-10-24
Payer: COMMERCIAL

## 2023-10-24 DIAGNOSIS — M17.11 PRIMARY LOCALIZED OSTEOARTHROSIS OF RIGHT LOWER LEG: Primary | ICD-10-CM

## 2023-10-24 DIAGNOSIS — M25.561 ARTHRALGIA OF RIGHT KNEE: ICD-10-CM

## 2023-10-24 PROCEDURE — 97110 THERAPEUTIC EXERCISES: CPT | Mod: GP | Performed by: PHYSICAL THERAPIST

## 2023-10-24 ASSESSMENT — PAIN SCALES - GENERAL: PAINLEVEL_OUTOF10: 0 - NO PAIN

## 2023-10-24 ASSESSMENT — PAIN - FUNCTIONAL ASSESSMENT: PAIN_FUNCTIONAL_ASSESSMENT: 0-10

## 2023-10-24 NOTE — PROGRESS NOTES
"  Physical Therapy Treatment    Patient Name: Leonel Ceballos  MRN: [id number]  Today's Date: 10/24/2023  Time Calculation  Start Time: 1045  Stop Time: 1120  Time Calculation (min): 35 min    Current Problem  1. Primary localized osteoarthrosis of right lower leg        2. Arthralgia of right knee            Insurance:  Number of Treatments Authorized: 7 of Med Nec        Payor: [employer] DUAL COMPLETE / Plan: [employer] DUAL COMPLETE / Product Type: *No Product type* /     Subjective   General  Reason for Referral: R knee pain s/p R TKA  Referred By: Dr. Rodgers  General Comment: Patient states that he's feeling pretty good overall and had a good night's sleep last night.    Performing HEP?: Yes    Precautions  Precautions  Precautions Comment: Ambulating with no assistive device today  Pain  Pain Assessment: 0-10  Pain Score: 0 - No pain    Objective   KNEE     Knee AROM WFL unless documented below  R knee flexion: (140°): 113°  R knee extension: (0°): 1° (0 post-treatment)    Treatments:    Therapeutic Exercise  Therapeutic Exercise Activity 1: SciFit (Seat 13), L3, 5 mins  Therapeutic Exercise Activity 2: Gastroc Stretch, incline, 3x30 secs  Therapeutic Exercise Activity 3: Knee Flexion Stretch on chair: 20x5\" holds  Therapeutic Exercise Activity 4: Standing TKE: x30  Therapeutic Exercise Activity 5: Supine quad set with heel prop: 20x5\" holds w 10# over knee  Therapeutic Exercise Activity 6: DL Leg Press: 3x10 w 70#  Therapeutic Exercise Activity 7: SL Leg Press: 3x10 w 40#    Balance/Neuromuscular Re-Education  Balance/Neuromuscular Re-Education Activity 1: SLS L, EO,AirEx, 30 secs x 5      Therapeutic Activity  Therapeutic Activity 1: Matrix Retro walking, 22.5 lbs, 10 feet x 5  Therapeutic Activity 2: Matrix Lateral walking, 22.5 lbs, 10 feet x 5      Assessment:  PT Assessment  Assessment Comment: Pt with shortened session today due to time constraints. Overall good tolerance to treatment but " continues to be limited with ROM and strength of the R knee. Pt. with noted improvement with decreased assistive device use and improved tolerance to functional tasks. Will continue to focus on progression as able and tolerated.    Plan:     PT Plan: Skilled PT (Progress per POC. Advance LE strengthening and ROM tasks for knee and hip)            Osei Patton, PT

## 2023-10-27 ENCOUNTER — TREATMENT (OUTPATIENT)
Dept: PHYSICAL THERAPY | Facility: CLINIC | Age: 63
End: 2023-10-27
Payer: COMMERCIAL

## 2023-10-27 DIAGNOSIS — M17.0 PRIMARY OSTEOARTHRITIS OF BOTH KNEES: Primary | ICD-10-CM

## 2023-10-27 DIAGNOSIS — M17.11 PRIMARY LOCALIZED OSTEOARTHROSIS OF RIGHT LOWER LEG: ICD-10-CM

## 2023-10-27 PROCEDURE — 97110 THERAPEUTIC EXERCISES: CPT | Mod: GP | Performed by: PHYSICAL MEDICINE & REHABILITATION

## 2023-10-27 PROCEDURE — 97530 THERAPEUTIC ACTIVITIES: CPT | Mod: GP | Performed by: PHYSICAL MEDICINE & REHABILITATION

## 2023-10-27 ASSESSMENT — PAIN SCALES - GENERAL: PAINLEVEL_OUTOF10: 0 - NO PAIN

## 2023-10-27 ASSESSMENT — PAIN - FUNCTIONAL ASSESSMENT: PAIN_FUNCTIONAL_ASSESSMENT: 0-10

## 2023-10-27 NOTE — PROGRESS NOTES
"  Physical Therapy Treatment    Patient Name: Leonel Ceballos  MRN: [id number]  Today's Date: 10/27/2023  Time Calculation  Start Time: 0835  Stop Time: 0915  Time Calculation (min): 40 min  Current Problem  1. Primary osteoarthritis of both knees        2. Primary localized osteoarthrosis of right lower leg            Insurance:  Number of Treatments Authorized: 8 of Med Nec          Subjective   General  Reason for Referral: R knee pain s/p R TKA  Referred By: Dr. Rodgers  General Comment: Patient reports no new issues regarding his right knee this visit. His greatest complaint is feeling a significant amount of stiffness when he gets up in the morning.      Performing HEP?: Yes    Precautions  Precautions  Precautions Comment: Ambulating with no assistive device today  Pain  Pain Assessment: 0-10  Pain Score: 0 - No pain    Objective   KNEE      Knee AROM   R knee flexion: (140°): 119°  R knee extension: (0°): 0°      Treatments:    Therapeutic Exercise  Therapeutic Exercise Activity 1: SciFit (Seat 13), L3, 5 mins  Therapeutic Exercise Activity 2: Gastroc Stretch, incline, 3x30 secs  Therapeutic Exercise Activity 3: Knee Flexion Stretch on chair: 20x5\" holds  Therapeutic Exercise Activity 4: DL Leg Press: 3x10 w 80#  Therapeutic Exercise Activity 5: SL Leg Press: 3x10 w 40#  Therapeutic Exercise Activity 6: Standing TKE: x30 at Matrix w 20#    Therapeutic Activity  Therapeutic Activity 1: Matrix Retro walking, 22.5 lbs, 10 feet x 5  Therapeutic Activity 2: Matrix Lateral walking, 22.5 lbs, 10 feet x 5    Assessment:   Today's visit focused on progressing activities to improve patient's right knee strength, AROM and functional ability. Patient demonstrated good effort toward today's progressions. Improved R knee flexion AROM was measured during today's session. No increased pain reported at the conclusion of the session. Overall response toward today's interventions was great.      Plan:  Continue with current POC. " Progress strength and AROM as tolerated.  OP PT Plan  Number of Treatments Authorized: 8 of Med Nec    Goals:  Active       PT Problem       STG       Start:  10/02/23    Expected End:  10/30/23       1) Patient will improve Oswestry Low Back Disability Questionnaire by 10% in order to indicate a measurable improvement in daily function and ability to complete daily tasks.  2) Patient will be able to complete ADLs with pain less than 4/10 in lumbar region.  3) Patient will have 75% of normal lumbar ROM in order to allow for proper bathing and dressing.   4) Patient will be independent with HEP to allow for continued improvement in daily tasks at home and in the community in 3 visits.            LTG       Start:  10/02/23    Expected End:  11/27/23       1) Patient will have 5/5 strength in lateral hip musculature to aid in stability with ambulation on varied surfaces in community.  2) Patient will be able to perform proper squatting technique and sitting postures in order to prevent increased pain with daily tasks.  3) Patient will be able to perform >30 seconds of SLS on even ground in order to allow for safe ambulation and to demonstrate reduced fall risk within the community.   4) Patient will improve Oswestry Low Back Disability Questionnaire to </=15% in order to indicate a measurable improvement in daily function and ability to complete daily tasks.                Andrew Ramsay, PT

## 2023-10-31 ENCOUNTER — TREATMENT (OUTPATIENT)
Dept: PHYSICAL THERAPY | Facility: CLINIC | Age: 63
End: 2023-10-31
Payer: COMMERCIAL

## 2023-10-31 DIAGNOSIS — M17.11 PRIMARY LOCALIZED OSTEOARTHROSIS OF RIGHT LOWER LEG: Primary | ICD-10-CM

## 2023-10-31 DIAGNOSIS — M25.561 ARTHRALGIA OF RIGHT KNEE: ICD-10-CM

## 2023-10-31 PROCEDURE — 97140 MANUAL THERAPY 1/> REGIONS: CPT | Mod: GP | Performed by: PHYSICAL MEDICINE & REHABILITATION

## 2023-10-31 PROCEDURE — 97110 THERAPEUTIC EXERCISES: CPT | Mod: GP | Performed by: PHYSICAL MEDICINE & REHABILITATION

## 2023-10-31 PROCEDURE — 97530 THERAPEUTIC ACTIVITIES: CPT | Mod: GP | Performed by: PHYSICAL MEDICINE & REHABILITATION

## 2023-10-31 ASSESSMENT — PAIN - FUNCTIONAL ASSESSMENT: PAIN_FUNCTIONAL_ASSESSMENT: 0-10

## 2023-10-31 ASSESSMENT — PAIN SCALES - GENERAL: PAINLEVEL_OUTOF10: 0 - NO PAIN

## 2023-10-31 NOTE — PROGRESS NOTES
"  Physical Therapy Treatment    Patient Name: Leonel Ceballos  MRN: [id number]  Today's Date: 10/31/2023  Time Calculation  Start Time: 0933  Stop Time: 1015  Time Calculation (min): 42 min  Current Problem  1. Primary localized osteoarthrosis of right lower leg        2. Arthralgia of right knee            Insurance:  Number of Treatments Authorized: 9 of Med Nec          Subjective   General  Reason for Referral: R knee pain s/p R TKA  Referred By: Dr. Rodgers  General Comment: Patient reports no new issues in his right knee this visit. However, he notes that lately his left knee has been more problematic.    Performing HEP?: Yes    Precautions  Precautions  Precautions Comment: Ambulating with no assistive device today  Pain  Pain Assessment: 0-10  Pain Score: 0 - No pain    Objective   KNEE    Knee AROM   R knee flexion: (140°): 121°  R knee extension: (0°): 0°    Treatments:    Therapeutic Exercise  Therapeutic Exercise Activity 1: SciFit (Seat 13), L3, 5 mins  Therapeutic Exercise Activity 2: Gastroc Stretch, incline, 3x30 secs  Therapeutic Exercise Activity 3: Knee Flexion Stretch on chair: 20x5\" holds  Therapeutic Exercise Activity 4: DL Leg Press: 3x10 w 90#  Therapeutic Exercise Activity 5: SL Leg Press: 3x10 w 50#  Therapeutic Exercise Activity 6: Standing TKE: x30 at Matrix w 20#    Balance/Neuromuscular Re-Education  Balance/Neuromuscular Re-Education Activity 1: Tiltboard balance M/L 5x30 secs    Manual Therapy  Manual Therapy Activity 1: Tibiofemoral Mobilization for knee flexion: Grade III  Manual Therapy Activity 2: IASTM over surgical incision    Therapeutic Activity  Therapeutic Activity 1: Forward Step Up: 2x10 on 8\" box w OKD  Therapeutic Activity 2: Lateral Step Up: 2x10 on 8\" box  Therapeutic Activity 3: Matrix Retro walking, 22.5 lbs, 10 feet x 5  Therapeutic Activity 4: Matrix Lateral walking, 22.5 lbs, 10 feet x 5      Assessment:   Today's visit focused on progressing activities to improve " patient's right knee strength, AROM and functional ability. Patient demonstrated good effort toward today's progressions. Improved right knee flexion AROM was measured during today's visit. No increased pain reported at the conclusion of the session. Overall response toward today's interventions was great.      Plan:  Continue with current POC. Recheck next visit.   OP PT Plan  Number of Treatments Authorized: 9 of Med Nec    Goals:  Active       PT Problem       STG       Start:  10/02/23    Expected End:  10/30/23       1) Patient will improve Oswestry Low Back Disability Questionnaire by 10% in order to indicate a measurable improvement in daily function and ability to complete daily tasks.  2) Patient will be able to complete ADLs with pain less than 4/10 in lumbar region.  3) Patient will have 75% of normal lumbar ROM in order to allow for proper bathing and dressing.   4) Patient will be independent with HEP to allow for continued improvement in daily tasks at home and in the community in 3 visits.            LTG       Start:  10/02/23    Expected End:  11/27/23       1) Patient will have 5/5 strength in lateral hip musculature to aid in stability with ambulation on varied surfaces in community.  2) Patient will be able to perform proper squatting technique and sitting postures in order to prevent increased pain with daily tasks.  3) Patient will be able to perform >30 seconds of SLS on even ground in order to allow for safe ambulation and to demonstrate reduced fall risk within the community.   4) Patient will improve Oswestry Low Back Disability Questionnaire to </=15% in order to indicate a measurable improvement in daily function and ability to complete daily tasks.                Andrew Ramsay, PT

## 2023-11-03 ENCOUNTER — TREATMENT (OUTPATIENT)
Dept: PHYSICAL THERAPY | Facility: CLINIC | Age: 63
End: 2023-11-03
Payer: MEDICARE

## 2023-11-03 ENCOUNTER — OFFICE VISIT (OUTPATIENT)
Dept: ORTHOPEDIC SURGERY | Facility: CLINIC | Age: 63
End: 2023-11-03
Payer: COMMERCIAL

## 2023-11-03 VITALS — BODY MASS INDEX: 29.62 KG/M2 | WEIGHT: 200 LBS | HEIGHT: 69 IN

## 2023-11-03 DIAGNOSIS — M17.11 PRIMARY LOCALIZED OSTEOARTHROSIS OF RIGHT LOWER LEG: Primary | ICD-10-CM

## 2023-11-03 DIAGNOSIS — Z96.651 STATUS POST TOTAL KNEE REPLACEMENT USING CEMENT, RIGHT: Primary | ICD-10-CM

## 2023-11-03 DIAGNOSIS — M25.561 ARTHRALGIA OF RIGHT KNEE: ICD-10-CM

## 2023-11-03 PROCEDURE — 3078F DIAST BP <80 MM HG: CPT | Performed by: ORTHOPAEDIC SURGERY

## 2023-11-03 PROCEDURE — 99024 POSTOP FOLLOW-UP VISIT: CPT | Performed by: ORTHOPAEDIC SURGERY

## 2023-11-03 PROCEDURE — 97140 MANUAL THERAPY 1/> REGIONS: CPT | Mod: GP | Performed by: PHYSICAL THERAPIST

## 2023-11-03 PROCEDURE — 97110 THERAPEUTIC EXERCISES: CPT | Mod: GP | Performed by: PHYSICAL THERAPIST

## 2023-11-03 PROCEDURE — 97530 THERAPEUTIC ACTIVITIES: CPT | Mod: GP | Performed by: PHYSICAL THERAPIST

## 2023-11-03 PROCEDURE — 1125F AMNT PAIN NOTED PAIN PRSNT: CPT | Performed by: ORTHOPAEDIC SURGERY

## 2023-11-03 PROCEDURE — 1159F MED LIST DOCD IN RCRD: CPT | Performed by: ORTHOPAEDIC SURGERY

## 2023-11-03 PROCEDURE — 1160F RVW MEDS BY RX/DR IN RCRD: CPT | Performed by: ORTHOPAEDIC SURGERY

## 2023-11-03 PROCEDURE — 3075F SYST BP GE 130 - 139MM HG: CPT | Performed by: ORTHOPAEDIC SURGERY

## 2023-11-03 RX ORDER — CEPHALEXIN 500 MG/1
500 CAPSULE ORAL 3 TIMES DAILY
Qty: 30 CAPSULE | Refills: 0 | Status: SHIPPED | OUTPATIENT
Start: 2023-11-03 | End: 2023-11-13

## 2023-11-03 ASSESSMENT — PAIN SCALES - GENERAL
PAINLEVEL_OUTOF10: 1
PAINLEVEL_OUTOF10: 1

## 2023-11-03 ASSESSMENT — PAIN - FUNCTIONAL ASSESSMENT
PAIN_FUNCTIONAL_ASSESSMENT: 0-10
PAIN_FUNCTIONAL_ASSESSMENT: 0-10

## 2023-11-03 NOTE — PROGRESS NOTES
Physical Therapy Treatment/Re-Check    Patient Name: Leonel Ceballos  MRN: [id number]  Today's Date: 11/3/2023  Time Calculation  Start Time: 1235  Stop Time: 1325  Time Calculation (min): 50 min    Current Problem  1. Primary localized osteoarthrosis of right lower leg        2. Arthralgia of right knee            Insurance:  Number of Treatments Authorized: 10 of Med Nec  Certification Period Start Date: 10/02/23  Certification Period End Date: 11/27/23  Payor: [employer] DUAL COMPLETE / Plan: [employer] DUAL COMPLETE / Product Type: *No Product type* /     Subjective   General  Reason for Referral: R knee pain s/p R TKA  Referred By: Dr. Rodgers  General Comment: Patient states that his right knee is feeling good.  He notes that overall his left knee has been giving him more trouble lately.    Performing HEP?: Yes    Precautions  Precautions  Precautions Comment: Ambulating with no assistive device today  Pain  Pain Assessment: 0-10  Pain Score: 1    Objective   KNEE    Knee Observation  Observation Comment: Generalized Joint swelling diffusely; Girth: Superior patella L: 38cm, R: 40.2cm;  Medial Joint Line: L: 38cm, R: 42.5cm    Knee Palpation/Joint Mobility Assessment  Palpation/Joint Mobility Comment: Palpation: No tenderness; Joint mobility: R patellar Sup/Inf Hypomobile, med/lat Normal  Knee AROM WFL unless documented below  R knee flexion: (140°): 121°  R knee extension: (0°): 0°    Knee MMT WFL unless documented below  R knee flexion: (5/5): 4/5  L knee flexion: (5/5): 4+/5  R knee extension: (5/5): 4+/5  L knee extension: (5/5): 5/5  DTR WFL unless documented below     Special Tests Negative unless documented below  Other: Girth: Medial Joint Line: L: 38cm, R: 42.5cm      Outcome Measures:  Other Measures  Lower Extremity Funtional Score (LEFS): 52/80 (65%)    Treatments:    Therapeutic Exercise  Therapeutic Exercise Activity 1: SciFit (Seat 13), L3, 5 mins  Therapeutic Exercise Activity 2:  "Gastroc Stretch, incline, 3x30 secs  Therapeutic Exercise Activity 3: Standing TKE: 2x20 at Matrix w 22.5#  Therapeutic Exercise Activity 4: DL Leg Press: 3x10 w 110#  Therapeutic Exercise Activity 5: SL Leg Press: 3x10 w 50#      Therapeutic Activity  Therapeutic Activity 1: Forward Step Up: 2x10 on 8\" box w OKD  Therapeutic Activity 2: Lateral Step Up: 2x10 on 8\" box  Therapeutic Activity 3: Matrix Retro walking, 22.5 lbs, 10 feet x 5  Therapeutic Activity 4: Matrix Lateral walking, 22.5 lbs, 10 feet x 5    Assessment:  PT Assessment  Assessment Comment: Patient with good tolerance to treatment overall today.  Continues to have diffuse joint swelling and lower extremity weakness evidenced by difficulty with CKC exercises particularly step ups.  Patient required standby assist and verbal cueing for proper form and technique.  Patient's left knee pain limiting progression with ADLs and functional tasks we will continue to monitor for impact on PT.    Plan:     PT Plan: Skilled PT (Continue per POC with regards to frequency and duration at this time. Advance LE strengthening and ROM tasks for knee and hip)  PT Frequency: 2 times per week  Duration: 3 weeks  Onset Date: 01/01/23       Goals:  STG (Expected End 10/30/2023):  1) Patient will improve LEFS score by 9 points in order to perform functional activities at home and in the community.  2) Patient will be able to complete ADLs with pain in knee less than 4/10. (GOAL MET)  3) Pt will improve knee flexion ROM to 120 degrees to be able to complete ADLs with less difficulty. (GOAL MET)  4) Patient will be independent with HEP to allow for continued improvement in daily tasks at home and in the community in 3 visits.  (GOAL MET)    LTG (Expected End (11/27/2023):  1) Patient will have 5/5 strength in hip musculature to aid in balance with ambulation on varied surfaces in community in 8 weeks (GOAL PARTIALLY MET)  2) Patient will be able to perform proper squatting " technique in order to reduce compression on knee and prevent increased pain with daily tasks in 8 weeks. (GOAL NOT TESTED)  3) Patient will be able to perform >30 seconds of SLS on even ground in order to allow for safe ambulation on all levels and to reduce fall risk within the community in 8 weeks. (GOAL NOT TESTED)  4) Patient will improve LEFS score >/=70/80 points in order to perform functional activities at home and in the community by discharge. (GOAL PARTIALLY MET)      Osei Patton, PT

## 2023-11-03 NOTE — PROGRESS NOTES
Subjective    Patient ID: Mabel Engel is a 63 y.o. male.    Chief Complaint: Follow-up of the Right Knee (S/P TKA )     Last Surgery: No surgery found  Last Surgery Date: No surgery found    HPI he comes in because he has a little stitch abscess at the top of the wound    Objective   Ortho Exam area of erythema small scab    Image Results:  XR lumbar spine complete 4+ views  PROCEDURE:         SPINE LUMBOSACRAL MIN 4 VIEW - CXR  0034  REASON FOR EXAM: M54.50 LOW BACK PAIN, UNSPECIFIED    RESULT: MRN: [id number]  Patient Name: MABEL ENGEL    STUDY:  SPINE LUMBOSACRAL MIN 4 VIEW; 4/28/2023 10:59 am    INDICATION:  M54.50 LOW BACK PAIN, UNSPECIFIED. Pain    COMPARISON:  None available.    ACCESSION NUMBER(S):  [id number]    ORDERING CLINICIAN:  QUINTON WANG    TECHNIQUE:  Views: AP, lateral,  bilateral oblique  and coned-down L5-S1.    FINDINGS:  RESULT: The alignment is within normal limits. The lumbar vertebrae  demonstrate no evidence for wedge fracture or compression deformity. There  is disc space narrowing with endplate osteophyte formation most pronounced  at the L5-S1 level. The pedicles and sacroiliac joints are unremarkable. No  abnormal soft tissue calcifications are noted.    IMPRESSION:  Degenerative changes of the lumbar spine    Dictation workstation:   [id number]    Original Interpreting Physician:   MIHCAELA SIDHU MD  Original Transcribed by/Date: MMODAL Apr 28 2023 10:15A  Original Electronically Signed by/Date: MICHAELA SIDHU MD Apr 28 2023  11:30A    Addendum Interpreting Physician:  Addendum Transcribed by/Date: NO ADDENDUM  Addendum Electronically Signed by/Date:      Assessment/Plan he pulled out a small piece of stitch with tweezers we will get a start him on some Keflex and we will see him again in 2 weeks  Encounter Diagnoses:  No diagnosis found.    No orders of the defined types were placed in this encounter.    No follow-ups on file.

## 2023-11-07 ENCOUNTER — TREATMENT (OUTPATIENT)
Dept: PHYSICAL THERAPY | Facility: CLINIC | Age: 63
End: 2023-11-07
Payer: MEDICARE

## 2023-11-07 DIAGNOSIS — M25.561 ARTHRALGIA OF RIGHT KNEE: ICD-10-CM

## 2023-11-07 DIAGNOSIS — M17.11 PRIMARY LOCALIZED OSTEOARTHROSIS OF RIGHT LOWER LEG: Primary | ICD-10-CM

## 2023-11-07 PROCEDURE — 97110 THERAPEUTIC EXERCISES: CPT | Mod: GP | Performed by: PHYSICAL THERAPIST

## 2023-11-07 PROCEDURE — 97530 THERAPEUTIC ACTIVITIES: CPT | Mod: GP | Performed by: PHYSICAL THERAPIST

## 2023-11-07 ASSESSMENT — PAIN SCALES - GENERAL: PAINLEVEL_OUTOF10: 3

## 2023-11-07 ASSESSMENT — PAIN - FUNCTIONAL ASSESSMENT: PAIN_FUNCTIONAL_ASSESSMENT: 0-10

## 2023-11-07 NOTE — PROGRESS NOTES
"  Physical Therapy Treatment    Patient Name: Leonel Ceballos  MRN: [id number]  Today's Date: 11/7/2023  Time Calculation  Start Time: 1030  Stop Time: 1116  Time Calculation (min): 46 min    Current Problem  1. Primary localized osteoarthrosis of right lower leg  Follow Up In Physical Therapy      2. Arthralgia of right knee  Follow Up In Physical Therapy          Insurance:  Number of Treatments Authorized: 11of Med Nec  Certification Period Start Date: 10/02/23  Certification Period End Date: 11/27/23  Payor: [employer] DUAL COMPLETE / Plan: [employer] DUAL COMPLETE / Product Type: *No Product type* /     Subjective   General  Reason for Referral: R knee pain s/p R TKA  Referred By: Dr. Rodgers  General Comment: Patient reports that he continues to feel good.  He does state that today he has intermittent \"stinging\" along the anterior knee and incision.  He also notes that he continues to have left knee pain with functional tasks and ADLs.    Performing HEP?: Yes    Precautions  Precautions  Precautions Comment: Ambulating with no assistive device today  Pain  Pain Assessment: 0-10  Pain Score: 3    Objective   KNEE    Knee AROM WFL unless documented below  R knee flexion: (140°): 121°  R knee extension: (0°): 0°      Treatments:    Therapeutic Exercise  Therapeutic Exercise Activity 1: SciFit (Seat 13), L3, 5 mins  Therapeutic Exercise Activity 2: Gastroc Stretch, incline, 3x30 secs  Therapeutic Exercise Activity 3: Dynamics: High knees, Butt kicks, tin soldiers, hip openers, hip closers, x 40 feet each  Therapeutic Exercise Activity 4: DL Leg Press: 3x10 w 110#  Therapeutic Exercise Activity 5: SL Leg Press: 3x10 w 50#  Therapeutic Exercise Activity 6: Deadlift to 6inch step, 35 lb KB, 2x6    Balance/Neuromuscular Re-Education  Balance/Neuromuscular Re-Education Activity 1: SLS L, EC,AirEx, 10 secs x 10         Therapeutic Activity  Therapeutic Activity 1: Forward Step Up to contralateral high knee, 8 " inch, 2x10 and med ball OH press  Therapeutic Activity 2: Anterior Step Down, 4 inch 2x10  Therapeutic Activity 3: Matrix Retro walking, 22.5 lbs, 10 feet x 5  Therapeutic Activity 4: Matrix Lateral walking, 22.5 lbs, 10 feet x 5  Therapeutic Activity 5: March- Unilateral Carry, 20 lb KB, 2x40 feet      Assessment:  PT Assessment  Assessment Comment: Patient challenged with strengthening today due to progression of weight as well as balance and CKC exercises.  Patient with appropriate fatigue throughout as well as requiring verbal and tactile cues for proper form and technique on dead lift and anterior stepdown.  Patient also with noted decreased eccentric control during anterior stepdown which is contributing to difficulty  descending stairs.  We will continue to focus on progression of addressing strength bilaterally.    Plan:     PT Plan: Skilled PT (Continue per POC with regards to frequency and duration at this time. Advance LE strengthening and ROM tasks for knee and hip)  PT Frequency: 2 times per week  Duration: 3 weeks  Onset Date: 01/01/23      Osei Patton, PT

## 2023-11-09 ENCOUNTER — TREATMENT (OUTPATIENT)
Dept: PHYSICAL THERAPY | Facility: CLINIC | Age: 63
End: 2023-11-09
Payer: MEDICARE

## 2023-11-09 DIAGNOSIS — M17.11 PRIMARY LOCALIZED OSTEOARTHROSIS OF RIGHT LOWER LEG: ICD-10-CM

## 2023-11-09 DIAGNOSIS — M25.561 ARTHRALGIA OF RIGHT KNEE: ICD-10-CM

## 2023-11-09 PROCEDURE — 97110 THERAPEUTIC EXERCISES: CPT | Mod: GP | Performed by: PHYSICAL MEDICINE & REHABILITATION

## 2023-11-09 PROCEDURE — 97016 VASOPNEUMATIC DEVICE THERAPY: CPT | Mod: GP | Performed by: PHYSICAL MEDICINE & REHABILITATION

## 2023-11-09 PROCEDURE — 97530 THERAPEUTIC ACTIVITIES: CPT | Mod: GP | Performed by: PHYSICAL MEDICINE & REHABILITATION

## 2023-11-09 ASSESSMENT — PAIN - FUNCTIONAL ASSESSMENT: PAIN_FUNCTIONAL_ASSESSMENT: 0-10

## 2023-11-09 ASSESSMENT — PAIN SCALES - GENERAL: PAINLEVEL_OUTOF10: 1

## 2023-11-09 NOTE — PROGRESS NOTES
"  Physical Therapy Treatment    Patient Name: Leonel Ceballos  MRN: [id number]  Today's Date: 11/9/2023  Time Calculation  Start Time: 1040  Stop Time: 1130  Time Calculation (min): 50 min  Current Problem  1. Primary localized osteoarthrosis of right lower leg  Follow Up In Physical Therapy      2. Arthralgia of right knee  Follow Up In Physical Therapy          Insurance:  Number of Treatments Authorized: 12 of Med Nec  Certification Period Start Date: 10/02/23  Certification Period End Date: 11/27/23    Subjective   General  Reason for Referral: R knee pain s/p R TKA  Referred By: Dr. Rodgers  General Comment: Patient reports no new complaints this visit. Some soreness and tightness in his right calf was felt this morning, but was resolved after some stretching.    Performing HEP?: Yes    Precautions  Precautions  Precautions Comment: Ambulating with no assistive device today  Pain  Pain Assessment: 0-10  Pain Score: 1    Objective   KNEE    Knee AROM  R knee flexion: (140°): 122°  R knee extension: (0°): 0°    Treatments:    Therapeutic Exercise  Therapeutic Exercise Activity 1: SciFit (Seat 13), L3, 5 mins  Therapeutic Exercise Activity 2: Gastroc Stretch, incline, 3x30 secs  Therapeutic Exercise Activity 3: Knee Flexion Stretch on chair: 20x5\" holds  Therapeutic Exercise Activity 4: Dynamics: High knees, Butt kicks, tin soldiers, hip openers, hip closers, x 40 feet each  Therapeutic Exercise Activity 5: DL Leg Press: 3x10 w 115#  Therapeutic Exercise Activity 6: SL Leg Press: 3x10 w 55#  Therapeutic Exercise Activity 7: Deadlift to 6inch step, 35 lb KB, 2x6    Balance/Neuromuscular Re-Education  Balance/Neuromuscular Re-Education Activity 1: March- Unilateral Carry, 20 lb KB, 2x40 feet    Therapeutic Activity  Therapeutic Activity 1: Matrix Retro walking, 22.5 lbs, 10 feet x 5  Therapeutic Activity 2: Matrix Lateral walking, 22.5 lbs, 10 feet x 5  Therapeutic Activity 3: Anterior Step Down, 4 inch " 2x10    Modalities  Modality 1: Untimed Vasopneumatic (34 degrees; low compression x10')    Assessment:   Today's visit focused on progressing activities to improve patient's bilateral lower extremity strength, balance and functional ability. Patient demonstrated good effort toward today's progressions. No increased pain reported at the conclusion of the session. Overall response toward today's interventions was great.      Plan:  Continue with current POC. Progress strength and AROM as tolerated.   OP PT Plan  PT Frequency: 2 times per week  Duration: 3 weeks  Onset Date: 01/01/23  Certification Period Start Date: 10/02/23  Certification Period End Date: 11/27/23  Number of Treatments Authorized: 12 of Med Nec    Goals:  Active       PT Problem       STG       Start:  10/02/23    Expected End:  10/30/23       1) Patient will improve LEFS score by 9 points in order to perform functional activities at home and in the community.  2) Patient will be able to complete ADLs with pain in knee less than 4/10. (GOAL MET)  3) Pt will improve knee flexion ROM to 120 degrees to be able to complete ADLs with less difficulty. (GOAL MET)  4) Patient will be independent with HEP to allow for continued improvement in daily tasks at home and in the community in 3 visits.  (GOAL MET)               LTG       Start:  10/02/23    Expected End:  11/27/23       1) Patient will have 5/5 strength in hip musculature to aid in balance with ambulation on varied surfaces in community in 8 weeks (GOAL PARTIALLY MET)  2) Patient will be able to perform proper squatting technique in order to reduce compression on knee and prevent increased pain with daily tasks in 8 weeks. (GOAL NOT TESTED)  3) Patient will be able to perform >30 seconds of SLS on even ground in order to allow for safe ambulation on all levels and to reduce fall risk within the community in 8 weeks. (GOAL NOT TESTED)  4) Patient will improve LEFS score >/=70/80 points in order to  perform functional activities at home and in the community by discharge. (GOAL PARTIALLY MET)                Andrew Ramsay, PT

## 2023-11-14 ENCOUNTER — TREATMENT (OUTPATIENT)
Dept: PHYSICAL THERAPY | Facility: CLINIC | Age: 63
End: 2023-11-14
Payer: MEDICARE

## 2023-11-14 DIAGNOSIS — M17.11 PRIMARY LOCALIZED OSTEOARTHROSIS OF RIGHT LOWER LEG: ICD-10-CM

## 2023-11-14 DIAGNOSIS — M25.561 ARTHRALGIA OF RIGHT KNEE: ICD-10-CM

## 2023-11-14 PROCEDURE — 97530 THERAPEUTIC ACTIVITIES: CPT | Mod: 59,GP | Performed by: PHYSICAL THERAPIST

## 2023-11-14 PROCEDURE — 97110 THERAPEUTIC EXERCISES: CPT | Mod: GP | Performed by: PHYSICAL THERAPIST

## 2023-11-14 PROCEDURE — 97150 GROUP THERAPEUTIC PROCEDURES: CPT | Mod: GP | Performed by: PHYSICAL THERAPIST

## 2023-11-14 ASSESSMENT — PAIN - FUNCTIONAL ASSESSMENT: PAIN_FUNCTIONAL_ASSESSMENT: 0-10

## 2023-11-14 ASSESSMENT — PAIN SCALES - GENERAL: PAINLEVEL_OUTOF10: 0 - NO PAIN

## 2023-11-14 NOTE — PROGRESS NOTES
"  Physical Therapy Treatment    Patient Name: Leonel Ceballos  MRN: [id number]  Today's Date: 11/14/2023  Time Calculation  Start Time: 1035  Stop Time: 1120  Time Calculation (min): 45 min    Current Problem  1. Primary localized osteoarthrosis of right lower leg  Follow Up In Physical Therapy      2. Arthralgia of right knee  Follow Up In Physical Therapy          Insurance:  Number of Treatments Authorized: 13 of Med Nec  Certification Period Start Date: 10/02/23  Certification Period End Date: 11/27/23  Payor: [employer] MEDICARE / Plan: [employer] MEDICARE / Product Type: *No Product type* /     Subjective   General  Reason for Referral: R knee pain s/p R TKA  Referred By: Dr. Rodgers  General Comment: Patient states that he is feeling pretty good today.  He notes he had some soreness posttreatment last session possibly from exercises that he had not done in a while.    Performing HEP?: Yes    Precautions  Precautions  Precautions Comment: Ambulating with no assistive device today  Pain  Pain Assessment: 0-10  Pain Score: 0 - No pain    Objective   KNEE    Knee AROM WFL unless documented below  R knee flexion: (140°): 122°  R knee extension: (0°): 0°      Treatments:    Therapeutic Exercise  Therapeutic Exercise Activity 1: SciFit (Seat 13), L3, 5 mins  Therapeutic Exercise Activity 2: Gastroc Stretch, incline, 3x30 secs  Therapeutic Exercise Activity 3: Knee Flexion Stretch on chair: 20x5\" holds  Therapeutic Exercise Activity 4: Dynamics: High knees, Butt kicks, tin soldiers, hip openers, hip closers, x 40 feet each  Therapeutic Exercise Activity 5: DL Leg Press: 3x10 w 115#  Therapeutic Exercise Activity 6: SL Leg Press: 3x10 w 55#  Therapeutic Exercise Activity 7: Matrix: Standing TKE, 20 lbs,  x30    Balance/Neuromuscular Re-Education  Balance/Neuromuscular Re-Education Activity 1: SLS L, EC,AirEx, 10 secs x 10         Therapeutic Activity  Therapeutic Activity 1: Matrix Retro walking, 22.5 lbs, " 10 feet x 5  Therapeutic Activity 2: Matrix Lateral walking, 22.5 lbs, 10 feet x 5  Therapeutic Activity 3: Anterior Step Down, 6 inch 2x10  Therapeutic Activity 4: Reverse Lunge to OH Press, 9 lb, 2x10  Therapeutic Activity 5: March- Unilateral Carry, 20 lb KB, 2x40 feet    Assessment:  PT Assessment  Assessment Comment: Patient with fair tolerance to treatment today.  Challenged with unilateral farmer carry evidenced by difficulty maintaining balance when single-leg position.  We will continue to progress close kinetic chain strengthening exercises Per patient tolerance.  Patient deferred vasopneumatic device today.    Incremental billing and group charging was performed today due to overlapping patient situation.     Plan:     PT Plan: Skilled PT (Continue per POC with regards to frequency and duration at this time. Advance LE strengthening and ROM tasks for knee and hip)  PT Frequency: 2 times per week  Duration: 3 weeks  Onset Date: 01/01/23         Osei Patton, PT

## 2023-11-16 ENCOUNTER — OFFICE VISIT (OUTPATIENT)
Dept: ORTHOPEDIC SURGERY | Facility: CLINIC | Age: 63
End: 2023-11-16
Payer: MEDICARE

## 2023-11-16 ENCOUNTER — TREATMENT (OUTPATIENT)
Dept: PHYSICAL THERAPY | Facility: CLINIC | Age: 63
End: 2023-11-16
Payer: MEDICARE

## 2023-11-16 DIAGNOSIS — M25.561 ARTHRALGIA OF RIGHT KNEE: ICD-10-CM

## 2023-11-16 DIAGNOSIS — M17.11 PRIMARY LOCALIZED OSTEOARTHROSIS OF RIGHT LOWER LEG: ICD-10-CM

## 2023-11-16 DIAGNOSIS — Z96.651 STATUS POST TOTAL KNEE REPLACEMENT USING CEMENT, RIGHT: Primary | ICD-10-CM

## 2023-11-16 PROCEDURE — 97110 THERAPEUTIC EXERCISES: CPT | Mod: GP | Performed by: PHYSICAL THERAPIST

## 2023-11-16 PROCEDURE — 97530 THERAPEUTIC ACTIVITIES: CPT | Mod: GP | Performed by: PHYSICAL THERAPIST

## 2023-11-16 PROCEDURE — 1159F MED LIST DOCD IN RCRD: CPT | Performed by: SURGERY

## 2023-11-16 PROCEDURE — 1126F AMNT PAIN NOTED NONE PRSNT: CPT | Performed by: SURGERY

## 2023-11-16 PROCEDURE — 99024 POSTOP FOLLOW-UP VISIT: CPT | Performed by: SURGERY

## 2023-11-16 PROCEDURE — 1160F RVW MEDS BY RX/DR IN RCRD: CPT | Performed by: SURGERY

## 2023-11-16 ASSESSMENT — PAIN SCALES - GENERAL: PAINLEVEL_OUTOF10: 0 - NO PAIN

## 2023-11-16 ASSESSMENT — PAIN - FUNCTIONAL ASSESSMENT: PAIN_FUNCTIONAL_ASSESSMENT: 0-10

## 2023-11-16 NOTE — PROGRESS NOTES
Subjective    Patient ID: Mabel Engel is a 63 y.o. male.    Chief Complaint: s/p R TKA 09/11/23     Last Surgery: No surgery found  Last Surgery Date: No surgery found    HPI  He is about 2 months postop from his right total knee he is still working in physical therapy gaining great range of motion of the knee is happy with his progress    Objective   Ortho Exam  Exam of the right knee shows range of motion 0-1 15 good stability no effusion retinaculum feels intact no clunk no areas of tenderness no distal edema neurovascular intact    Image Results:  XR lumbar spine complete 4+ views  PROCEDURE:         SPINE LUMBOSACRAL MIN 4 VIEW - CXR  0034  REASON FOR EXAM: M54.50 LOW BACK PAIN, UNSPECIFIED    RESULT: MRN: [id number]  Patient Name: MABEL ENGEL    STUDY:  SPINE LUMBOSACRAL MIN 4 VIEW; 4/28/2023 10:59 am    INDICATION:  M54.50 LOW BACK PAIN, UNSPECIFIED. Pain    COMPARISON:  None available.    ACCESSION NUMBER(S):  [id number]    ORDERING CLINICIAN:  QUINTON WANG    TECHNIQUE:  Views: AP, lateral,  bilateral oblique  and coned-down L5-S1.    FINDINGS:  RESULT: The alignment is within normal limits. The lumbar vertebrae  demonstrate no evidence for wedge fracture or compression deformity. There  is disc space narrowing with endplate osteophyte formation most pronounced  at the L5-S1 level. The pedicles and sacroiliac joints are unremarkable. No  abnormal soft tissue calcifications are noted.    IMPRESSION:  Degenerative changes of the lumbar spine    Dictation workstation:   [id number]    Original Interpreting Physician:   MICHAELA SIDHU MD  Original Transcribed by/Date: MMODAL Apr 28 2023 10:15A  Original Electronically Signed by/Date: MICHAELA SIDHU MD Apr 28 2023  11:30A    Addendum Interpreting Physician:  Addendum Transcribed by/Date: NO ADDENDUM  Addendum Electronically Signed by/Date:      Assessment/Plan   Encounter Diagnoses:  Status post total knee replacement using cement, right  He has made  great progress the knee he has gained full extension is still working in physical therapy is happy with his progress we will see him again in another month to make sure he is staying on track we will see him back next month    No orders of the defined types were placed in this encounter.    No follow-ups on file.

## 2023-11-21 ENCOUNTER — CLINICAL SUPPORT (OUTPATIENT)
Dept: PHYSICAL THERAPY | Facility: CLINIC | Age: 63
End: 2023-11-21
Payer: MEDICARE

## 2023-11-21 DIAGNOSIS — M17.11 PRIMARY LOCALIZED OSTEOARTHROSIS OF RIGHT LOWER LEG: ICD-10-CM

## 2023-11-21 DIAGNOSIS — M25.561 ARTHRALGIA OF RIGHT KNEE: ICD-10-CM

## 2023-11-21 PROCEDURE — 97110 THERAPEUTIC EXERCISES: CPT | Mod: GP | Performed by: PHYSICAL THERAPIST

## 2023-11-21 PROCEDURE — 97530 THERAPEUTIC ACTIVITIES: CPT | Mod: GP | Performed by: PHYSICAL THERAPIST

## 2023-11-21 ASSESSMENT — PAIN - FUNCTIONAL ASSESSMENT: PAIN_FUNCTIONAL_ASSESSMENT: 0-10

## 2023-11-21 ASSESSMENT — PAIN SCALES - GENERAL: PAINLEVEL_OUTOF10: 0 - NO PAIN

## 2023-11-21 NOTE — PROGRESS NOTES
Physical Therapy Treatment    Patient Name: Leonel eCballos  MRN: [id number]  Today's Date: 11/21/2023  Time Calculation  Start Time: 1730  Stop Time: 1815  Time Calculation (min): 45 min    Current Problem  1. Primary localized osteoarthrosis of right lower leg  Follow Up In Physical Therapy      2. Arthralgia of right knee  Follow Up In Physical Therapy          Insurance:  Number of Treatments Authorized: 15 of Med Nec  Certification Period Start Date: 10/02/23  Certification Period End Date: 11/27/23  Payor: [employer] / Plan: [employer] / Product Type: *No Product type* /     Subjective   General  Reason for Referral: R knee pain s/p R TKA  Referred By: Dr. Rodgers  General Comment: Patient states that he has had increased swelling over the past 2 to 3 days.  He notes he went on an extended road trip this past weekend with approximately 12 hours of traveling where he was the passenger.  He states there really is not increased pain just to pressure from the swelling in the lower extremity including the knee and ankle.    Performing HEP?: Yes    Precautions  Precautions  Precautions Comment: Ambulating with no assistive device today  Pain  Pain Assessment: 0-10  Pain Score: 0 - No pain    Objective   KNEE      Knee Observation  Observation Comment: Generalized Joint swelling diffusely;    Treatments:    Therapeutic Exercise  Therapeutic Exercise Activity 1: SciFit (Seat 13), L3, 5 mins  Therapeutic Exercise Activity 2: Dynamics: High knees, Butt kicks, tin soldiers, hip openers, hip closers, x 40 feet each  Therapeutic Exercise Activity 3: Matrix: Standing TKE, 25 lbs, x30  Therapeutic Exercise Activity 4: SL RDL to high knee OH med ball press, 4 lbs, 2x5 R/L  Therapeutic Exercise Activity 5: DL Leg Press: 3x10 w 130#  Therapeutic Exercise Activity 6: SL Leg Press: 3x10 w 70#    Balance/Neuromuscular Re-Education  Balance/Neuromuscular Re-Education Activity 1: SLS with med ball toss  at rebounder, 4 lbs, R/L, 2x15 each         Therapeutic Activity  Therapeutic Activity 1: Matrix Retro walking, 30 lbs, 10 feet x 5  Therapeutic Activity 2: Matrix Lateral walking, 30 lbs, 10 feet x 5  Therapeutic Activity 3: Sliders: Ant-Lat-Post, R/L, 1x10 each  Therapeutic Activity 4: March- Unilateral Carry, 20 lb KB, 2x40 feet    Assessment:  PT Assessment  Assessment Comment: Patient completed treatment today with no increase symptoms.  Particularly challenged with balance exercises with several LOB noted during single-leg tasks.  Also challenged with eccentric control of CKC exercises evidenced by increased shaking and subjective reports of pain in L > R knee.  Patient continue to benefit from lower extremity strengthening to assist with improvement in ADLs for return to PFL.    Plan:     PT Plan: Skilled PT (Continue per POC with regards to frequency and duration at this time. Advance LE strengthening and ROM tasks for knee and hip)  PT Frequency: 2 times per week  Duration: 3 weeks  Onset Date: 01/01/23      Osei Patton, PT

## 2023-11-24 DIAGNOSIS — N52.9 ERECTILE DYSFUNCTION, UNSPECIFIED ERECTILE DYSFUNCTION TYPE: ICD-10-CM

## 2023-11-27 ENCOUNTER — TREATMENT (OUTPATIENT)
Dept: PHYSICAL THERAPY | Facility: CLINIC | Age: 63
End: 2023-11-27
Payer: MEDICARE

## 2023-11-27 DIAGNOSIS — M17.11 PRIMARY LOCALIZED OSTEOARTHROSIS OF RIGHT LOWER LEG: ICD-10-CM

## 2023-11-27 DIAGNOSIS — M25.561 ARTHRALGIA OF RIGHT KNEE: ICD-10-CM

## 2023-11-27 PROCEDURE — 97110 THERAPEUTIC EXERCISES: CPT | Mod: GP | Performed by: PHYSICAL THERAPIST

## 2023-11-27 PROCEDURE — 97530 THERAPEUTIC ACTIVITIES: CPT | Mod: GP | Performed by: PHYSICAL THERAPIST

## 2023-11-27 RX ORDER — SILDENAFIL 50 MG/1
50 TABLET, FILM COATED ORAL DAILY PRN
Qty: 30 TABLET | Refills: 0 | Status: SHIPPED | OUTPATIENT
Start: 2023-11-27 | End: 2023-12-21 | Stop reason: ENTERED-IN-ERROR

## 2023-11-27 ASSESSMENT — PAIN - FUNCTIONAL ASSESSMENT: PAIN_FUNCTIONAL_ASSESSMENT: 0-10

## 2023-11-27 ASSESSMENT — PAIN SCALES - GENERAL: PAINLEVEL_OUTOF10: 0 - NO PAIN

## 2023-11-27 NOTE — PROGRESS NOTES
Physical Therapy Treatment    Patient Name: Leonel Ceballos  MRN: [id number]  Today's Date: 11/27/2023  Time Calculation  Start Time: 0920  Stop Time: 1005  Time Calculation (min): 45 min    Current Problem  1. Primary localized osteoarthrosis of right lower leg  Follow Up In Physical Therapy      2. Arthralgia of right knee  Follow Up In Physical Therapy          Insurance:  Number of Treatments Authorized: 16 of Med Nec  Certification Period Start Date: 10/02/23  Certification Period End Date: 11/27/23  Payor: [employer] / Plan: [employer] / Product Type: *No Product type* /     Subjective   General  Reason for Referral: R knee pain s/p R TKA  Referred By: Dr. Rodgers  General Comment: Patient reports that he is feeling good today with no problems noted in the right knee.    Performing HEP?: Yes    Precautions  Precautions  Precautions Comment: Ambulating with no assistive device today  Pain  Pain Assessment: 0-10  Pain Score: 0 - No pain    Objective   KNEE      Knee Observation  Observation Comment: Generalized Joint swelling diffusely; Decreased R quad CSA compared L wtih minimal to no tenderness R quad tendon      Treatments:    Therapeutic Exercise  Therapeutic Exercise Activity 1: SciFit (Seat 13), L3, 5 mins  Therapeutic Exercise Activity 2: Dynamics: High knees, Butt kicks, tin soldiers, hip openers, hip closers, x 40 feet each  Therapeutic Exercise Activity 3: SL RDL to high knee OH med ball press, 4 lbs, 2x5 R/L  Therapeutic Exercise Activity 4: DL Leg Press: 3x10 w 130#  Therapeutic Exercise Activity 5: SL Leg Press: 3x10 w 70#    Balance/Neuromuscular Re-Education  Balance/Neuromuscular Re-Education Activity 1: SLS with med ball toss at rebounder, 4 lbs, R/L, 2x15 each         Therapeutic Activity  Therapeutic Activity 1: Posterior Step Down eccentric lowering, 8 inch 2x10  Therapeutic Activity 2: Anterior Step Down, 6 inch 2x10  Therapeutic Activity 3: Sliders:  Ant-Lat-Post, R/L, 1x10 each  Therapeutic Activity 4: March- Unilateral Carry, 20 lb KB, 2x40 feet      Assessment:  PT Assessment  Assessment Comment: Patient challenged with WBing exercises today particularly with L LE due to increased pain. Patient with decreased eccentric control noted during CKC tasks and appropriate fatigue at the end of the session.    Plan:     PT Plan: Skilled PT (Continue per POC with regards to frequency and duration at this time. Advance LE strengthening and ROM tasks for knee and hip)        Onset Date: 01/01/23      Osei Patton, PT

## 2023-11-30 ENCOUNTER — TREATMENT (OUTPATIENT)
Dept: PHYSICAL THERAPY | Facility: CLINIC | Age: 63
End: 2023-11-30
Payer: MEDICARE

## 2023-11-30 DIAGNOSIS — M17.11 PRIMARY LOCALIZED OSTEOARTHROSIS OF RIGHT LOWER LEG: ICD-10-CM

## 2023-11-30 DIAGNOSIS — M25.561 ARTHRALGIA OF RIGHT KNEE: ICD-10-CM

## 2023-11-30 PROCEDURE — 97112 NEUROMUSCULAR REEDUCATION: CPT | Mod: GP | Performed by: PHYSICAL MEDICINE & REHABILITATION

## 2023-11-30 PROCEDURE — 97110 THERAPEUTIC EXERCISES: CPT | Mod: GP | Performed by: PHYSICAL MEDICINE & REHABILITATION

## 2023-11-30 ASSESSMENT — PAIN SCALES - GENERAL: PAINLEVEL_OUTOF10: 1

## 2023-11-30 ASSESSMENT — PAIN - FUNCTIONAL ASSESSMENT: PAIN_FUNCTIONAL_ASSESSMENT: 0-10

## 2023-11-30 NOTE — PROGRESS NOTES
"  Physical Therapy Treatment    Patient Name: Leonel Ceballos  MRN: [id number]  Today's Date: 11/30/2023  Time Calculation  Start Time: 0943  Stop Time: 1033  Time Calculation (min): 50 min  Current Problem  1. Primary localized osteoarthrosis of right lower leg  Follow Up In Physical Therapy      2. Arthralgia of right knee  Follow Up In Physical Therapy          Insurance:  Number of Treatments Authorized: 17 of Med Nec  Certification Period Start Date: 10/02/23  Certification Period End Date: 11/27/23    Subjective   General  Reason for Referral: R knee pain s/p R TKA  Referred By: Dr. Rodgers  General Comment: Patient reports feeling a little stiff and sore this visit.    Performing HEP?: Yes    Precautions  Precautions  Precautions Comment: None  Pain  Pain Assessment: 0-10  Pain Score: 1    Objective   Treatments:  Therapeutic Exercise  Therapeutic Exercise Activity 1: SciFit (Seat 13), L3, 5 mins  Therapeutic Exercise Activity 2: Knee Flexion Stretch on chair: 20x5\" holds  Therapeutic Exercise Activity 3: SL RDL: 2x10 w 7.5# at Matrix  Therapeutic Exercise Activity 4: DL Leg Press: 3x10 w 140#  Therapeutic Exercise Activity 5: SL Leg Press: 3x10 w 70#  Therapeutic Exercise Activity 6: Knee Extension w SL Eccentric: 3x10  Therapeutic Exercise Activity 7: Dynamics: High knees, Butt kicks, tin soldiers, hip openers, hip closers, x 40 feet each    Balance/Neuromuscular Re-Education  Balance/Neuromuscular Re-Education Activity 1: SLS with med ball toss at rebounder, 4 lbs, R/L, 2x15 each  Balance/Neuromuscular Re-Education Activity 2: BOSU forward Step Up: x20 w OKD  Balance/Neuromuscular Re-Education Activity 3: BOSU Lateral Step Up: x20 w OKD    Therapeutic Activity  Therapeutic Activity 1: Anterior Step Down, 6 inch 2x10  Therapeutic Activity 2: March- Unilateral Carry, 20 lb KB, 2x40 feet    Modalities  Modality 1: Untimed Vasopneumatic (34 degrees; low compression x10')    Assessment:   Today's visit focused on " progressing activities to improve patient's right knee strength, stability and functional ability. Patient demonstrated good effort toward today's progressions. No increased pain reported at the conclusion of the session. Overall response toward today's interventions was great.      Plan:  Continue with current POC. Progress strength and functional ability as tolerated.   OP PT Plan  Onset Date: 01/01/23  Certification Period Start Date: 10/02/23  Certification Period End Date: 11/27/23  Number of Treatments Authorized: 17 of Med [facility]    Goals:  Active       PT Problem       STG       Start:  10/02/23    Expected End:  10/30/23       1) Patient will improve LEFS score by 9 points in order to perform functional activities at home and in the community.  2) Patient will be able to complete ADLs with pain in knee less than 4/10. (GOAL MET)  3) Pt will improve knee flexion ROM to 120 degrees to be able to complete ADLs with less difficulty. (GOAL MET)  4) Patient will be independent with HEP to allow for continued improvement in daily tasks at home and in the community in 3 visits.  (GOAL MET)               LTG       Start:  10/02/23    Expected End:  11/27/23       1) Patient will have 5/5 strength in hip musculature to aid in balance with ambulation on varied surfaces in community in 8 weeks (GOAL PARTIALLY MET)  2) Patient will be able to perform proper squatting technique in order to reduce compression on knee and prevent increased pain with daily tasks in 8 weeks. (GOAL NOT TESTED)  3) Patient will be able to perform >30 seconds of SLS on even ground in order to allow for safe ambulation on all levels and to reduce fall risk within the community in 8 weeks. (GOAL NOT TESTED)  4) Patient will improve LEFS score >/=70/80 points in order to perform functional activities at home and in the community by discharge. (GOAL PARTIALLY MET)                Andrew Ramsay, PT

## 2023-12-01 ENCOUNTER — OFFICE VISIT (OUTPATIENT)
Dept: ORTHOPEDIC SURGERY | Facility: CLINIC | Age: 63
End: 2023-12-01
Payer: MEDICARE

## 2023-12-01 DIAGNOSIS — Z96.651 STATUS POST TOTAL KNEE REPLACEMENT USING CEMENT, RIGHT: Primary | ICD-10-CM

## 2023-12-01 PROCEDURE — 1159F MED LIST DOCD IN RCRD: CPT | Performed by: ORTHOPAEDIC SURGERY

## 2023-12-01 PROCEDURE — 1125F AMNT PAIN NOTED PAIN PRSNT: CPT | Performed by: ORTHOPAEDIC SURGERY

## 2023-12-01 PROCEDURE — 99024 POSTOP FOLLOW-UP VISIT: CPT | Performed by: ORTHOPAEDIC SURGERY

## 2023-12-01 PROCEDURE — 1160F RVW MEDS BY RX/DR IN RCRD: CPT | Performed by: ORTHOPAEDIC SURGERY

## 2023-12-01 NOTE — PROGRESS NOTES
Subjective    Patient ID: Mabel Engel is a 63 y.o. male.    Chief Complaint: Follow-up of the Right Knee (S/P TKA)     Last Surgery: No surgery found  Last Surgery Date: No surgery found    HPI he is about 10 weeks out after his knee replacement is doing terrific    Objective   Ortho Exam range of motion is 0-1 20 excellent stability wounds well-healed walking well    Image Results:  XR lumbar spine complete 4+ views  PROCEDURE:         SPINE LUMBOSACRAL MIN 4 VIEW - CXR  0034  REASON FOR EXAM: M54.50 LOW BACK PAIN, UNSPECIFIED    RESULT: MRN: [id number]  Patient Name: MABEL ENGEL    STUDY:  SPINE LUMBOSACRAL MIN 4 VIEW; 4/28/2023 10:59 am    INDICATION:  M54.50 LOW BACK PAIN, UNSPECIFIED. Pain    COMPARISON:  None available.    ACCESSION NUMBER(S):  [id number]    ORDERING CLINICIAN:  QUINTON WANG    TECHNIQUE:  Views: AP, lateral,  bilateral oblique  and coned-down L5-S1.    FINDINGS:  RESULT: The alignment is within normal limits. The lumbar vertebrae  demonstrate no evidence for wedge fracture or compression deformity. There  is disc space narrowing with endplate osteophyte formation most pronounced  at the L5-S1 level. The pedicles and sacroiliac joints are unremarkable. No  abnormal soft tissue calcifications are noted.    IMPRESSION:  Degenerative changes of the lumbar spine    Dictation workstation:   [id number]    Original Interpreting Physician:   MICHAELA SIDHU MD  Original Transcribed by/Date: MMODAL Apr 28 2023 10:15A  Original Electronically Signed by/Date: MICHAELA SIDHU MD Apr 28 2023  11:30A    Addendum Interpreting Physician:  Addendum Transcribed by/Date: NO ADDENDUM  Addendum Electronically Signed by/Date:      Assessment/Plan he is doing great is going to finish out his physical therapy and we will go ahead and talk with Dr. Pino about doing the left knee  Encounter Diagnoses:  Status post total knee replacement using cement, right    No orders of the defined types were placed in this  encounter.    No follow-ups on file.

## 2023-12-05 ENCOUNTER — TREATMENT (OUTPATIENT)
Dept: PHYSICAL THERAPY | Facility: CLINIC | Age: 63
End: 2023-12-05
Payer: MEDICARE

## 2023-12-05 DIAGNOSIS — M17.11 PRIMARY LOCALIZED OSTEOARTHROSIS OF RIGHT LOWER LEG: ICD-10-CM

## 2023-12-05 DIAGNOSIS — M25.561 ARTHRALGIA OF RIGHT KNEE: ICD-10-CM

## 2023-12-05 PROCEDURE — 97112 NEUROMUSCULAR REEDUCATION: CPT | Mod: GP | Performed by: PHYSICAL MEDICINE & REHABILITATION

## 2023-12-05 PROCEDURE — 97530 THERAPEUTIC ACTIVITIES: CPT | Mod: GP | Performed by: PHYSICAL MEDICINE & REHABILITATION

## 2023-12-05 PROCEDURE — 97110 THERAPEUTIC EXERCISES: CPT | Mod: GP | Performed by: PHYSICAL MEDICINE & REHABILITATION

## 2023-12-05 ASSESSMENT — PAIN - FUNCTIONAL ASSESSMENT: PAIN_FUNCTIONAL_ASSESSMENT: 0-10

## 2023-12-05 ASSESSMENT — PAIN SCALES - GENERAL: PAINLEVEL_OUTOF10: 1

## 2023-12-05 NOTE — PROGRESS NOTES
"  Physical Therapy Treatment    Patient Name: Leonel Ceballos  MRN: [id number]  Today's Date: 12/5/2023  Time Calculation  Start Time: 1028  Stop Time: 1107  Time Calculation (min): 39 min  Current Problem  1. Primary localized osteoarthrosis of right lower leg  Follow Up In Physical Therapy      2. Arthralgia of right knee  Follow Up In Physical Therapy          Insurance:  Number of Treatments Authorized: 18 of Med Nec  Certification Period Start Date: 12/05/23  Certification Period End Date: 03/04/24    Subjective   General  Reason for Referral: R knee pain s/p R TKA  Referred By: Dr. Rodgers  General Comment: Patient reports no new complaints this visit. No increased pain or adverse responses following his previous session. He is scheduled to consult with an orthopedic surgeon regarding his left knee.    Precautions  Precautions  Precautions Comment: None  Pain  Pain Assessment: 0-10  Pain Score: 1    Objective   HIP  Specific Lower Extremity MMT   R Iliopsoas: (5/5): 4/5  R Gluteals (sidelying): (5/5): 4/5  L Gluteals (sidelying): (5/5): 4+/5  R knee flexion: (5/5): 4+/5  R knee extension: (5/5): 4+/5    Knee AROM   R knee flexion: (140°): 127    Outcome Measures:  Other Measures  Lower Extremity Funtional Score (LEFS): 55/80= 68.75%    Treatments:    Therapeutic Exercise  Therapeutic Exercise Activity 1: SciFit (Seat 13), L3, 5 mins  Therapeutic Exercise Activity 2: Knee Flexion Stretch on chair: 20x5\" holds  Therapeutic Exercise Activity 3: SL RDL: 2x10 w 10# at Matrix  Therapeutic Exercise Activity 4: DL Leg Press: 3x10 w 145#  Therapeutic Exercise Activity 5: SL Leg Press: 3x10 w 70#    Balance/Neuromuscular Re-Education  Balance/Neuromuscular Re-Education Activity 1: BOSU forward Step Up: x20 w OKD  Balance/Neuromuscular Re-Education Activity 2: BOSU Lateral Step Up: x20 w OKD  Balance/Neuromuscular Re-Education Activity 3: SLS with med ball toss at rebounder, 4 lbs, R/L, 2x15 each      Therapeutic " Activity  Therapeutic Activity 1: Anterior Step Down, 6 inch 2x10  Therapeutic Activity 2: March- Unilateral Carry, 20 lb KB, 2x40 feet    Assessment:   Patient has attended 18 sessions of skilled physical therapy intervention. Patient displays great progress since initiating his plan of care. Progress is noted objectively with improved right knee AROM, and RLE strength measurements. Progress is noted subjectively with patient reports and improved LEFS score. Patient continues to report difficulty with higher level activities including balance, and prolonged walking. He will continue to benefit from skilled physical therapy intervention in order to address these deficits and improve overall quality of life.     Plan:  OP PT Plan  Treatment/Interventions: Dry needling, Education/ Instruction, Electrical stimulation, Manual therapy, Neuromuscular re-education, Therapeutic activities, Therapeutic exercises, Vasopneumatic device  PT Plan: Skilled PT  PT Frequency: 2 times per week  Duration: 3 weeks  Onset Date: 01/01/23  Certification Period Start Date: 12/05/23  Certification Period End Date: 03/04/24  Number of Treatments Authorized: 18 of Med Nec    Goals:  Active       PT Problem       STG       Start:  10/02/23    Expected End:  10/30/23       1) Patient will improve LEFS score by 9 points in order to perform functional activities at home and in the community.  2) Patient will be able to complete ADLs with pain in knee less than 4/10. (GOAL MET)  3) Pt will improve knee flexion ROM to 120 degrees to be able to complete ADLs with less difficulty. (GOAL MET)  4) Patient will be independent with HEP to allow for continued improvement in daily tasks at home and in the community in 3 visits.  (GOAL MET)               LTG       Start:  10/02/23    Expected End:  11/27/23       1) Patient will have 5/5 strength in hip musculature to aid in balance with ambulation on varied surfaces in community in 8 weeks (GOAL PARTIALLY  MET)  2) Patient will be able to perform proper squatting technique in order to reduce compression on knee and prevent increased pain with daily tasks in 8 weeks. (GOAL NOT TESTED)  3) Patient will be able to perform >30 seconds of SLS on even ground in order to allow for safe ambulation on all levels and to reduce fall risk within the community in 8 weeks. (GOAL NOT TESTED)  4) Patient will improve LEFS score >/=70/80 points in order to perform functional activities at home and in the community by discharge. (GOAL PARTIALLY MET)                Andrew Ramsay, PT

## 2023-12-07 ENCOUNTER — TREATMENT (OUTPATIENT)
Dept: PHYSICAL THERAPY | Facility: CLINIC | Age: 63
End: 2023-12-07
Payer: MEDICARE

## 2023-12-07 DIAGNOSIS — M17.11 PRIMARY LOCALIZED OSTEOARTHROSIS OF RIGHT LOWER LEG: ICD-10-CM

## 2023-12-07 DIAGNOSIS — M25.561 ARTHRALGIA OF RIGHT KNEE: ICD-10-CM

## 2023-12-07 PROCEDURE — 97110 THERAPEUTIC EXERCISES: CPT | Mod: GP | Performed by: PHYSICAL MEDICINE & REHABILITATION

## 2023-12-07 PROCEDURE — 97112 NEUROMUSCULAR REEDUCATION: CPT | Mod: GP | Performed by: PHYSICAL MEDICINE & REHABILITATION

## 2023-12-07 ASSESSMENT — PAIN SCALES - GENERAL: PAINLEVEL_OUTOF10: 1

## 2023-12-07 ASSESSMENT — PAIN - FUNCTIONAL ASSESSMENT: PAIN_FUNCTIONAL_ASSESSMENT: 0-10

## 2023-12-07 NOTE — PROGRESS NOTES
"  Physical Therapy Treatment    Patient Name: Leonel Ceballos  MRN: [id number]  Today's Date: 12/7/2023  Time Calculation  Start Time: 0937  Stop Time: 1016  Time Calculation (min): 39 min  Current Problem  1. Primary localized osteoarthrosis of right lower leg  Follow Up In Physical Therapy      2. Arthralgia of right knee  Follow Up In Physical Therapy          Insurance:  Number of Treatments Authorized: 19 of Med Nec  Certification Period Start Date: 12/05/23  Certification Period End Date: 03/04/24    Subjective   General  Reason for Referral: R knee pain s/p R TKA  Referred By: Dr. Rodgers  General Comment: Patient reports no new complaints this visit. No increased pain or adverse responses following his previous visit.    Precautions  Precautions  Precautions Comment: None  Pain  Pain Assessment: 0-10  Pain Score: 1    Objective     Treatments:    Therapeutic Exercise  Therapeutic Exercise Activity 1: SciFit (Seat 13), L4, 5 mins  Therapeutic Exercise Activity 2: Knee Flexion Stretch on chair: 20x5\" holds  Therapeutic Exercise Activity 3: DL Leg Press: 3x10 w 150#  Therapeutic Exercise Activity 4: SL Leg Press: 3x10 w 75#  Therapeutic Exercise Activity 5: TG L5 Squat Jumps: x20  Therapeutic Exercise Activity 6: TG L5 Jogging: x20  Therapeutic Exercise Activity 7: SL RDL: 2x10 w 10# at Matrix  Therapeutic Exercise Activity 8: Dynamics: High knees, Butt kicks, tin soldiers, hip openers, hip closers, x 40 feet each    Balance/Neuromuscular Re-Education  Balance/Neuromuscular Re-Education Activity 1: SLS with med ball toss at rebounder, 4 lbs, R/L, 2x15 each  Balance/Neuromuscular Re-Education Activity 2: BOSU forward Step Up: x20 w OKD  Balance/Neuromuscular Re-Education Activity 3: BOSU Lateral Step Up: x20 w OKD    Therapeutic Activity  Therapeutic Activity 1: Anterior Step Down, 6 inch 2x10  Therapeutic Activity 2: Crossover Step Up: x20 on 6\" box  Therapeutic Activity 3: March- Unilateral Carry, 20 lb KB, 2x40 " feet      Assessment:  PT Assessment  Assessment Comment: Today's visit focused on progressing activities to improve patient's right knee strength, stability and functional ability. Patient demonstrated good effort toward today's progressions. Some increased pain in patient's left knee was reported following jumping activities on the total gym. However, no increased pain reported at the conclusion of the session. Overall response toward today's interventions was great.    Plan:  Continue with current POC. Progress strength and functional ability as tolerated.   OP PT Plan  Onset Date: 01/01/23  Certification Period Start Date: 12/05/23  Certification Period End Date: 03/04/24  Number of Treatments Authorized: 19 of Med Nec    Goals:  Active       PT Problem       STG       Start:  10/02/23    Expected End:  10/30/23       1) Patient will improve LEFS score by 9 points in order to perform functional activities at home and in the community.  2) Patient will be able to complete ADLs with pain in knee less than 4/10. (GOAL MET)  3) Pt will improve knee flexion ROM to 120 degrees to be able to complete ADLs with less difficulty. (GOAL MET)  4) Patient will be independent with HEP to allow for continued improvement in daily tasks at home and in the community in 3 visits.  (GOAL MET)               LTG       Start:  10/02/23    Expected End:  11/27/23       1) Patient will have 5/5 strength in hip musculature to aid in balance with ambulation on varied surfaces in community in 8 weeks (GOAL PARTIALLY MET)  2) Patient will be able to perform proper squatting technique in order to reduce compression on knee and prevent increased pain with daily tasks in 8 weeks. (GOAL NOT TESTED)  3) Patient will be able to perform >30 seconds of SLS on even ground in order to allow for safe ambulation on all levels and to reduce fall risk within the community in 8 weeks. (GOAL NOT TESTED)  4) Patient will improve LEFS score >/=70/80 points  in order to perform functional activities at home and in the community by discharge. (GOAL PARTIALLY MET)                Andrew Ramsay, PT

## 2023-12-12 ENCOUNTER — ANCILLARY PROCEDURE (OUTPATIENT)
Dept: RADIOLOGY | Facility: CLINIC | Age: 63
End: 2023-12-12
Payer: MEDICARE

## 2023-12-12 DIAGNOSIS — M25.562 CHRONIC PAIN OF LEFT KNEE: ICD-10-CM

## 2023-12-12 DIAGNOSIS — G89.29 CHRONIC PAIN OF LEFT KNEE: ICD-10-CM

## 2023-12-12 PROCEDURE — 73562 X-RAY EXAM OF KNEE 3: CPT | Mod: LT

## 2023-12-13 ENCOUNTER — OFFICE VISIT (OUTPATIENT)
Dept: ORTHOPEDIC SURGERY | Facility: CLINIC | Age: 63
End: 2023-12-13
Payer: MEDICARE

## 2023-12-13 ENCOUNTER — ANCILLARY PROCEDURE (OUTPATIENT)
Dept: RADIOLOGY | Facility: CLINIC | Age: 63
End: 2023-12-13
Payer: MEDICARE

## 2023-12-13 DIAGNOSIS — M25.562 CHRONIC PAIN OF LEFT KNEE: ICD-10-CM

## 2023-12-13 DIAGNOSIS — M25.562 CHRONIC PAIN OF LEFT KNEE: Primary | ICD-10-CM

## 2023-12-13 DIAGNOSIS — G89.29 CHRONIC PAIN OF LEFT KNEE: ICD-10-CM

## 2023-12-13 DIAGNOSIS — G89.29 CHRONIC PAIN OF LEFT KNEE: Primary | ICD-10-CM

## 2023-12-13 PROCEDURE — 73564 X-RAY EXAM KNEE 4 OR MORE: CPT | Mod: LT,FY

## 2023-12-13 PROCEDURE — 1125F AMNT PAIN NOTED PAIN PRSNT: CPT | Performed by: ORTHOPAEDIC SURGERY

## 2023-12-13 PROCEDURE — 1160F RVW MEDS BY RX/DR IN RCRD: CPT | Performed by: ORTHOPAEDIC SURGERY

## 2023-12-13 PROCEDURE — 99215 OFFICE O/P EST HI 40 MIN: CPT | Performed by: ORTHOPAEDIC SURGERY

## 2023-12-13 PROCEDURE — 1159F MED LIST DOCD IN RCRD: CPT | Performed by: ORTHOPAEDIC SURGERY

## 2023-12-13 RX ORDER — ACETAMINOPHEN 325 MG/1
975 TABLET ORAL ONCE
Status: CANCELLED | OUTPATIENT
Start: 2023-12-13 | End: 2023-12-13

## 2023-12-13 RX ORDER — CELECOXIB 50 MG/1
400 CAPSULE ORAL ONCE
Status: CANCELLED | OUTPATIENT
Start: 2023-12-13 | End: 2023-12-13

## 2023-12-13 RX ORDER — CEFAZOLIN SODIUM 2 G/100ML
2 INJECTION, SOLUTION INTRAVENOUS ONCE
Status: CANCELLED | OUTPATIENT
Start: 2024-03-14 | End: 2023-12-13

## 2023-12-13 RX ORDER — SODIUM CHLORIDE 9 MG/ML
100 INJECTION, SOLUTION INTRAVENOUS CONTINUOUS
Status: CANCELLED | OUTPATIENT
Start: 2024-03-14

## 2023-12-13 NOTE — LETTER
December 13, 2023     Curry Mejia MD  [address]  [address]  [place] [address]    Patient: Leonel Ceballos   YOB: 1960   Date of Visit: 12/13/2023       Dear Dr. Curry Mejia MD:    Thank you for referring Leonel Ceballos to me for evaluation. Below are my notes for this consultation.  If you have questions, please do not hesitate to call me. I look forward to following your patient along with you.       Sincerely,     Gabriele Pino MD      CC: No Recipients  ______________________________________________________________________________________    This is a consultation from Dr. Curry Mejia MD for   Chief Complaint   Patient presents with   • Left Knee - Pain     SURGERY CONSULT       This is a 63 y.o. male who presents for evaluation of his left knee.  Patient has left knee pain, this has been going on for many years.  He states it has been about 6 or 7 years that he had chronic knee arthritis.  It is getting worse.  He had multiple injections over the years but has never had surgery.  He complains of deep sharp stabbing pain over the medial and anterior knee worse with any type of walking or weightbearing a long time standing.  He is experienced a severe exacerbation of his pain over the last 1 to 2 months.  He has had extensive trial of nonsurgical manage including use of anti-inflammatories physical therapy activity modification use of assistive devices cortisone injections.  Despite that he has severe and worsening pain which impacts his quality of life and activities of daily living.  He had a right total knee done about 3 months ago by another surgeon who is now retiring.  That knee is doing very well.    Physical Exam    There has been no interval change in this patient's past medical, surgical, medications, allergies, family history or social history since the most recent visit to a provider within our department. 14 point review of systems was performed, reviewed, and  negative except for pertinent positives documented in the history of present illness.     Constitutional: well developed, well nourished male in no acute distress  Psychiatric: normal mood, appropriate affect  Eyes: sclera anicteric  HENT: normocephalic/atraumatic  CV: regular rate and rhythm   Respiratory: non labored breathing  Integumentary: no rash  Neurological: moves all extremities    Left knee exam: skin intact no lacerations or abrations.  1+ effusion.  Tender medial joint line. negative log roll negative patellar grind. ROM 5-100 table to varus and valgus stress at 0 and 30 degrees. negative lachman negative posterior drawer negative rufino. 5/5 ehl/fhl/gs/ta. silt s/s/sp/dp/t. 2+ dp/pt        Xrays were ordered by me, they were reviewed and independently interpreted by me today, they show severe degenerative disease bone-on-bone arthritis subchondral sclerosis osteophyte formation Kellgren-Madi grade 4    Procedures      Impression/Plan: This is a 63 y.o. male with severe left knee arthritis that has failed nonoperative management.  Patient elected to proceed with left total knee.    I had an extensive discussion with the patient regarding her condition and possible treatment options. Nonsurgical treatment for left knee arthritis includes activity modification, weight loss, use of a cane or other assistive device, pain medications and nonsteroidal anti-inflammatory medications, joint injections, and physical therapy. The patient has attempted non-surgical treatment for this condition for greater than 6 months and it has failed. We discussed the risks benefits and alternatives of total knee arthroplasty. Benefits of joint replacement include: Relief of pain, improvement of function, and improved quality of life. Alternatives include observation and watchful waiting, and the nonsurgical modalities noted above.   We discussed the risks of complications as well as the risks of morbidity and mortality  related to surgical treatment with total joint replacement. We reviewed the fact that total joint replacement is major elective surgery with significant associated surgical and procedural risk factors as detailed below.   Risks include: Pain, blood loss, damage to nearby anatomical structures including but not limited to nerves or blood vessels muscles tendons and bone, failure surgery to ameliorate symptoms, persistence of pain surrounding the affected joint, mechanical failure of the prosthesis including but not limited to loosening of the prosthesis from bone ,breakage of the prosthesis and dislocation of the prosthesis, change in length or appearance of a limb, infection possibly necessitating further surgery, removal of the prosthesis, or limb amputation, the need for additional surgery for other reasons, blood clots, amputation, and death. No guarantees were implied or given.  All questions were answered and the patient voiced their understanding.   Discussed with the patient that during total knee arthroplasty prosthetic resurfacing of the patella may or may not be performed at the discretion of thesurgeon during surgery. In the event that prosthetic patellar resurfacing is not performed, nonprosthetic arthroplasty will be performed with removal of bone spurs, circumferential synovectomy and electrocautery. Patient was informed that anterior knee pain and patellofemoral crepitus can potentially occur after knee surgery with or without prosthetic patellar resurfacing.  A complete set of surgical instructions was given to the patient. The patient was educated regarding preoperative nutrition, preparation of their home for postoperative rehabilitation, choosing a care partner, medical and dental clearance, the cessation of medications that can cause bleeding or presenting to risk for infection, chlorhexidine bath, nasal swab and decontamination, post operative follow up, and need for medical equipment. Patient  "was also educated regarding the possibility of same day surgery and related criteria and protocols. The patient will attend our joint class further education, and thereafter they will return for a preoperative visit. A presurgery education booklet was also given to the patient.     surgical plan: Left total knee  implants: DePuy  approach: Standard  DVT ppx aspirin  drugs to stop none  allergy to abx none  post operative abx: ancef +/- vanc (per protocol)  special clearance needed none    BMI Readings from Last 1 Encounters:   11/03/23 29.53 kg/m²      Lab Results   Component Value Date    CREATININE 1.0 09/12/2023     Tobacco Use: Not on file      Computed MELD 3.0 unavailable. Necessary lab results were not found in the last year.  Computed MELD-Na unavailable. Necessary lab results were not found in the last year.       Lab Results   Component Value Date    HGBA1C 5.8 08/29/2023     No results found for: \"STAPHMRSASCR\"  "

## 2023-12-13 NOTE — PROGRESS NOTES
This is a consultation from Dr. Curry Mejia MD for   Chief Complaint   Patient presents with    Left Knee - Pain     SURGERY CONSULT       This is a 63 y.o. male who presents for evaluation of his left knee.  Patient has left knee pain, this has been going on for many years.  He states it has been about 6 or 7 years that he had chronic knee arthritis.  It is getting worse.  He had multiple injections over the years but has never had surgery.  He complains of deep sharp stabbing pain over the medial and anterior knee worse with any type of walking or weightbearing a long time standing.  He is experienced a severe exacerbation of his pain over the last 1 to 2 months.  He has had extensive trial of nonsurgical manage including use of anti-inflammatories physical therapy activity modification use of assistive devices cortisone injections.  Despite that he has severe and worsening pain which impacts his quality of life and activities of daily living.  He had a right total knee done about 3 months ago by another surgeon who is now retiring.  That knee is doing very well.    Physical Exam    There has been no interval change in this patient's past medical, surgical, medications, allergies, family history or social history since the most recent visit to a provider within our department. 14 point review of systems was performed, reviewed, and negative except for pertinent positives documented in the history of present illness.     Constitutional: well developed, well nourished male in no acute distress  Psychiatric: normal mood, appropriate affect  Eyes: sclera anicteric  HENT: normocephalic/atraumatic  CV: regular rate and rhythm   Respiratory: non labored breathing  Integumentary: no rash  Neurological: moves all extremities    Left knee exam: skin intact no lacerations or abrations.  1+ effusion.  Tender medial joint line. negative log roll negative patellar grind. ROM 5-100 table to varus and valgus stress at 0  and 30 degrees. negative lachman negative posterior drawer negative rufino. 5/5 ehl/fhl/gs/ta. silt s/s/sp/dp/t. 2+ dp/pt        Xrays were ordered by me, they were reviewed and independently interpreted by me today, they show severe degenerative disease bone-on-bone arthritis subchondral sclerosis osteophyte formation Kellgren-Madi grade 4    Procedures      Impression/Plan: This is a 63 y.o. male with severe left knee arthritis that has failed nonoperative management.  Patient elected to proceed with left total knee.    I had an extensive discussion with the patient regarding her condition and possible treatment options. Nonsurgical treatment for left knee arthritis includes activity modification, weight loss, use of a cane or other assistive device, pain medications and nonsteroidal anti-inflammatory medications, joint injections, and physical therapy. The patient has attempted non-surgical treatment for this condition for greater than 6 months and it has failed. We discussed the risks benefits and alternatives of total knee arthroplasty. Benefits of joint replacement include: Relief of pain, improvement of function, and improved quality of life. Alternatives include observation and watchful waiting, and the nonsurgical modalities noted above.   We discussed the risks of complications as well as the risks of morbidity and mortality related to surgical treatment with total joint replacement. We reviewed the fact that total joint replacement is major elective surgery with significant associated surgical and procedural risk factors as detailed below.   Risks include: Pain, blood loss, damage to nearby anatomical structures including but not limited to nerves or blood vessels muscles tendons and bone, failure surgery to ameliorate symptoms, persistence of pain surrounding the affected joint, mechanical failure of the prosthesis including but not limited to loosening of the prosthesis from bone ,breakage of the  prosthesis and dislocation of the prosthesis, change in length or appearance of a limb, infection possibly necessitating further surgery, removal of the prosthesis, or limb amputation, the need for additional surgery for other reasons, blood clots, amputation, and death. No guarantees were implied or given.  All questions were answered and the patient voiced their understanding.   Discussed with the patient that during total knee arthroplasty prosthetic resurfacing of the patella may or may not be performed at the discretion of thesurgeon during surgery. In the event that prosthetic patellar resurfacing is not performed, nonprosthetic arthroplasty will be performed with removal of bone spurs, circumferential synovectomy and electrocautery. Patient was informed that anterior knee pain and patellofemoral crepitus can potentially occur after knee surgery with or without prosthetic patellar resurfacing.  A complete set of surgical instructions was given to the patient. The patient was educated regarding preoperative nutrition, preparation of their home for postoperative rehabilitation, choosing a care partner, medical and dental clearance, the cessation of medications that can cause bleeding or presenting to risk for infection, chlorhexidine bath, nasal swab and decontamination, post operative follow up, and need for medical equipment. Patient was also educated regarding the possibility of same day surgery and related criteria and protocols. The patient will attend our joint class further education, and thereafter they will return for a preoperative visit. A presurgery education booklet was also given to the patient.     surgical plan: Left total knee  implants: DePuy  approach: Standard  DVT ppx aspirin  drugs to stop none  allergy to abx none  post operative abx: ancef +/- vanc (per protocol)  special clearance needed none    BMI Readings from Last 1 Encounters:   11/03/23 29.53 kg/m²      Lab Results   Component  "Value Date    CREATININE 1.0 09/12/2023     Tobacco Use: Not on file      Computed MELD 3.0 unavailable. Necessary lab results were not found in the last year.  Computed MELD-Na unavailable. Necessary lab results were not found in the last year.       Lab Results   Component Value Date    HGBA1C 5.8 08/29/2023     No results found for: \"STAPHMRSASCR\"  "

## 2023-12-14 ENCOUNTER — TELEPHONE (OUTPATIENT)
Dept: PRIMARY CARE | Facility: CLINIC | Age: 63
End: 2023-12-14
Payer: MEDICARE

## 2023-12-14 DIAGNOSIS — R73.9 HYPERGLYCEMIA: ICD-10-CM

## 2023-12-14 DIAGNOSIS — E55.9 VITAMIN D DEFICIENCY: ICD-10-CM

## 2023-12-14 DIAGNOSIS — Z12.5 ENCOUNTER FOR PROSTATE CANCER SCREENING: ICD-10-CM

## 2023-12-14 DIAGNOSIS — E78.2 MIXED HYPERLIPIDEMIA: ICD-10-CM

## 2023-12-14 DIAGNOSIS — Z01.89 ENCOUNTER FOR ROUTINE LABORATORY TESTING: Primary | ICD-10-CM

## 2023-12-20 ASSESSMENT — ENCOUNTER SYMPTOMS
APPETITE CHANGE: 0
FEVER: 0
VOMITING: 0
HEADACHES: 0
SHORTNESS OF BREATH: 0
COUGH: 0
ABDOMINAL PAIN: 0
DIARRHEA: 0
NAUSEA: 0
CHILLS: 0

## 2023-12-20 NOTE — PROGRESS NOTES
[facility]: [facility]  PROGRESS NOTE      Leonel Ceballos is a 63 y.o. male that is presenting today for Annual Exam.    Assessment/Plan   Diagnoses and all orders for this visit:  Annual physical exam  Primary hypertension  -     CBC and Auto Differential; Future  -     Comprehensive Metabolic Panel; Future  -     Lipid Panel; Future  -     TSH with reflex to Free T4 if abnormal; Future  Mixed hyperlipidemia  -     Lipid Panel; Future  Atrial flutter, unspecified type (CMS/HCC)  Vitamin D deficiency  -     Vitamin D 25-Hydroxy,Total (for eval of Vitamin D levels); Future  Hyperglycemia  -     Hemoglobin A1C; Future  Encounter for prostate cancer screening  -     Prostate Specific Antigen; Future  Primary osteoarthritis of both knees  Encounter for routine laboratory testing  -     CBC and Auto Differential; Future  -     Comprehensive Metabolic Panel; Future  -     Lipid Panel; Future  -     Hemoglobin A1C; Future  -     TSH with reflex to Free T4 if abnormal; Future  -     Vitamin D 25-Hydroxy,Total (for eval of Vitamin D levels); Future  -     Prostate Specific Antigen; Future  Testosterone deficiency  -     Testosterone; Future  -     tadalafil (Cialis) 20 mg tablet; Take 1 tablet (20 mg) by mouth once daily as needed for erectile dysfunction.    Subjective   HPI  63 y.o. male here for follow up.  The patient is here for physical exam and follow up.  We reviewed the medical, family and social history as outlined below.  We reviewed any currently prescribed medications.  We reviewed the screening and prevention schedule in detail.      Review of Systems   Constitutional:  Negative for appetite change, chills and fever.   Respiratory:  Negative for cough and shortness of breath.    Cardiovascular:  Negative for chest pain.   Gastrointestinal:  Negative for abdominal pain, diarrhea, nausea and vomiting.   Neurological:  Negative for headaches.   All other systems reviewed and are negative.      Objective   Vitals:    12/21/23 1545   BP: 122/70   Pulse: 71   Temp: 36.7 °C (98 °F)   SpO2: 97%      Body mass index is 30.41 kg/m².  Physical Exam  Vitals reviewed.   Constitutional:       General: He is not in acute distress.     Appearance: He is not toxic-appearing.   HENT:      Head: Normocephalic and atraumatic.      Mouth/Throat:      Mouth: Mucous membranes are moist.   Eyes:      Pupils: Pupils are equal, round, and reactive to light.   Cardiovascular:      Rate and Rhythm: Normal rate and regular rhythm.      Heart sounds: No murmur heard.  Pulmonary:      Breath sounds: Normal breath sounds. No wheezing, rhonchi or rales.   Abdominal:      General: There is no distension.      Palpations: Abdomen is soft.   Musculoskeletal:      Right lower leg: No edema.      Left lower leg: No edema.   Neurological:      General: No focal deficit present.      Mental Status: He is alert and oriented to person, place, and time.       Diagnostic Results   Lab Results   Component Value Date    GLUCOSE 98 09/12/2023    CALCIUM 8.4 (L) 09/12/2023     09/12/2023    K 4.4 09/12/2023    CO2 22 (L) 09/12/2023     09/12/2023    BUN 13 09/12/2023    CREATININE 1.0 09/12/2023     Lab Results   Component Value Date    ALT 34 12/12/2022    AST 31 12/12/2022    ALKPHOS 52 12/12/2022    BILITOT 0.9 12/12/2022     Lab Results   Component Value Date    WBC 11.6 (H) 09/12/2023    HGB 12.6 (L) 09/12/2023    HCT 38.1 (L) 09/12/2023    MCV 95.7 09/12/2023     09/12/2023     Lab Results   Component Value Date    CHOL 169 12/12/2022    CHOL 152 12/02/2021    CHOL 147 03/03/2020     Lab Results   Component Value Date    HDL 31 (L) 12/12/2022    HDL 26 (L) 12/02/2021    HDL 27 (L) 03/03/2020     Lab Results   Component Value Date    LDLCALC 94 12/12/2022    LDLCALC 72 12/02/2021    LDLCALC 72 03/03/2020     Lab Results   Component Value Date    TRIG 218 (H) 12/12/2022    TRIG 270 (H) 12/02/2021    TRIG 238 (H) 03/03/2020  "    No components found for: \"CHOLHDL\"  Lab Results   Component Value Date    HGBA1C 5.8 08/29/2023     Other labs not included in the list above were reviewed either before or during this encounter.    History    No past medical history on file.  Past Surgical History:   Procedure Laterality Date    OTHER SURGICAL HISTORY  01/04/2023    Cardiac cath His ablation    OTHER SURGICAL HISTORY  01/04/2023    Knee surgery    TOTAL KNEE ARTHROPLASTY Right      Family History   Problem Relation Name Age of Onset    Alzheimer's disease Mother 84     Cancer Father      Lung cancer Father      Other (esophagus) Father      Other (smoker) Father      Dementia Other Grandmother     Diabetes Other mother's side     Heart failure Other mother's side     Coronary artery disease Other mother's side     Prostate cancer Other mother's side      Social History     Socioeconomic History    Marital status:      Spouse name: Not on file    Number of children: Not on file    Years of education: Not on file    Highest education level: Not on file   Occupational History    Not on file   Tobacco Use    Smoking status: Never    Smokeless tobacco: Never   Substance and Sexual Activity    Alcohol use: Yes     Comment: occasionally    Drug use: Never    Sexual activity: Not on file   Other Topics Concern    Not on file   Social History Narrative    Not on file     Social Determinants of Health     Financial Resource Strain: Not on file   Food Insecurity: Not on file   Transportation Needs: Not on file   Physical Activity: Not on file   Stress: Not on file   Social Connections: Not on file   Intimate Partner Violence: Not on file   Housing Stability: Not on file     No Known Allergies  Current Outpatient Medications on File Prior to Visit   Medication Sig Dispense Refill    ascorbic acid (Vitamin C) 1,000 mg tablet Take 1 tablet (1,000 mg) by mouth once daily.      aspirin 81 mg EC tablet TAKE 1 TABLET BY MOUTH TWO TIMES A DAY 60 tablet 0 "    atorvastatin (Lipitor) 10 mg tablet Take 1 tablet (10 mg) by mouth once daily.      cetirizine (ZyrTEC) 10 mg capsule Take 1 capsule (10 mg) by mouth once daily.      DAILY MULTI-VITAMIN ORAL Take 1 tablet by mouth once daily.      losartan-hydrochlorothiazide (Hyzaar) 50-12.5 mg tablet Take 1 tablet by mouth once daily. For 90 days      meloxicam (Mobic) 15 mg tablet Take 1 tablet (15 mg) by mouth once daily as needed.      methylsulfonylmethane (MSM ORAL) Take 1 capsule by mouth once daily. 3000 mg      omeprazole (PriLOSEC) 20 mg DR capsule Take 1 capsule (20 mg) by mouth every other day.      [DISCONTINUED] sildenafil (Viagra) 50 mg tablet TAKE ONE TABLET BY MOUTH EVERY DAY AS NEEDED 30 tablet 0    multivitamin with minerals (multivitamin with folic acid) tablet Take 1 tablet by mouth once daily.      [DISCONTINUED] cephalexin (Keflex) 500 mg capsule TAKE 1 CAPSULE BY MOUTH THREE TIMES A DAY (Patient not taking: Reported on 12/21/2023) 21 capsule 0    [DISCONTINUED] cholecalciferol (Vitamin D-3) 50 mcg (2,000 unit) capsule Take 1 capsule (50 mcg) by mouth early in the morning..      [DISCONTINUED] cholecalciferol-soy isoflavone 2,000-64 unit-mg tablet Take 1 tablet by mouth once daily. With a meal      [DISCONTINUED] ciclopirox (Penlac) 8 % solution Apply topically.  PLEASE SEE ATTACHED FOR DETAILED DIRECTIONS External      [DISCONTINUED] clobetasol (Temovate) 0.05 % cream 2 times a day. APPLY TO AFFECTED AREA ON SCALP      [DISCONTINUED] clotrimazole-betamethasone (Lotrisone) cream Apply 1 Application topically 2 times a day.      [DISCONTINUED] coenzyme Q-10 100 mg capsule Take 1 capsule (100 mg) by mouth once daily. With a meal      [DISCONTINUED] coQ10, ubiquinol, 100 mg capsule Take 1 capsule (100 mg) by mouth once daily.      [DISCONTINUED] dabigatran etexilate (Pradaxa) 150 mg capsule Take 1 capsule (150 mg) by mouth 2 times a day.      [DISCONTINUED] desonide (DesOwen) 0.05 % cream Apply 1 Application  topically 2 times a day as needed. a thin film to affected area      [DISCONTINUED] docosahexaenoic acid/epa (FISH OIL ORAL) Take 1 capsule by mouth once daily.      [DISCONTINUED] docosahexaenoic acid/epa (FISH OIL ORAL) Take 1 capsule by mouth once daily.      [DISCONTINUED] ginkgo-choline bitartrate 120 mg- 110 mg tablet Take 1 tablet by mouth 2 times a day.      [DISCONTINUED] glucosamine/msm/magnesium/C (GLUCOSAMINE COMPLEX-MSM ORAL) Take 1 tablet by mouth.      [DISCONTINUED] HYDROcodone-acetaminophen (Norco) 5-325 mg tablet TAKE 1 OR 2 TABLETS BY MOUTH EVERY 6 HOURS AS NEEDED FOR PAIN (Patient not taking: Reported on 12/21/2023) 36 tablet 0    [DISCONTINUED] loratadine (Claritin) 10 mg tablet Take by mouth.      [DISCONTINUED] omeprazole (PriLOSEC) 20 mg tablet,delayed release (DR/EC) EC tablet Take 1 tablet (20 mg) by mouth once daily.      [DISCONTINUED] terbinafine (LamISIL) 250 mg tablet Take 1 tablet (250 mg) by mouth once daily.      [DISCONTINUED] triamcinolone acetonide (Kenalog) 40 mg/mL injection USE AS DIRECTED.      [DISCONTINUED] vitamin E 180 mg (400 unit) capsule Take 1 capsule (180 mg) by mouth once daily.      [DISCONTINUED] vitamin E 180 mg (400 unit) capsule Take 1 capsule (180 mg) by mouth once daily.      [DISCONTINUED] zinc gluconate 50 mg tablet Take 1 tablet (50 mg) by mouth once daily.      [DISCONTINUED] zinc sulfate 50 mg zinc (220 mg) tablet Take 1 tablet (50 mg of elemental zinc) by mouth once daily.       No current facility-administered medications on file prior to visit.     Immunization History   Administered Date(s) Administered    Flu vaccine (IIV4), preservative free *Check age/dose* 12/03/2021    Influenza, injectable, quadrivalent 11/27/2018, 11/18/2019    Influenza, seasonal, injectable 09/16/2013    Pfizer Purple Cap SARS-CoV-2 02/24/2021, 03/17/2021, 12/03/2021    Tdap vaccine, age 7 year and older (BOOSTRIX) 01/12/2016    Zoster vaccine, recombinant, adult (SHINGRIX)  03/06/2020, 06/07/2020     Patient's medical history was reviewed and updated either before or during this encounter.       Curry Mejia MD

## 2023-12-21 ENCOUNTER — OFFICE VISIT (OUTPATIENT)
Dept: PRIMARY CARE | Facility: CLINIC | Age: 63
End: 2023-12-21
Payer: MEDICARE

## 2023-12-21 VITALS
DIASTOLIC BLOOD PRESSURE: 70 MMHG | HEART RATE: 71 BPM | OXYGEN SATURATION: 97 % | SYSTOLIC BLOOD PRESSURE: 122 MMHG | BODY MASS INDEX: 30.51 KG/M2 | WEIGHT: 206 LBS | HEIGHT: 69 IN | TEMPERATURE: 98 F

## 2023-12-21 DIAGNOSIS — R73.9 HYPERGLYCEMIA: ICD-10-CM

## 2023-12-21 DIAGNOSIS — Z00.00 ANNUAL PHYSICAL EXAM: Primary | ICD-10-CM

## 2023-12-21 DIAGNOSIS — Z01.89 ENCOUNTER FOR ROUTINE LABORATORY TESTING: ICD-10-CM

## 2023-12-21 DIAGNOSIS — I10 PRIMARY HYPERTENSION: ICD-10-CM

## 2023-12-21 DIAGNOSIS — E34.9 TESTOSTERONE DEFICIENCY: ICD-10-CM

## 2023-12-21 DIAGNOSIS — E78.2 MIXED HYPERLIPIDEMIA: ICD-10-CM

## 2023-12-21 DIAGNOSIS — Z12.5 ENCOUNTER FOR PROSTATE CANCER SCREENING: ICD-10-CM

## 2023-12-21 DIAGNOSIS — I48.92 ATRIAL FLUTTER, UNSPECIFIED TYPE (MULTI): ICD-10-CM

## 2023-12-21 DIAGNOSIS — M17.0 PRIMARY OSTEOARTHRITIS OF BOTH KNEES: ICD-10-CM

## 2023-12-21 DIAGNOSIS — E55.9 VITAMIN D DEFICIENCY: ICD-10-CM

## 2023-12-21 PROCEDURE — 3074F SYST BP LT 130 MM HG: CPT | Performed by: INTERNAL MEDICINE

## 2023-12-21 PROCEDURE — 1159F MED LIST DOCD IN RCRD: CPT | Performed by: INTERNAL MEDICINE

## 2023-12-21 PROCEDURE — 99215 OFFICE O/P EST HI 40 MIN: CPT | Performed by: INTERNAL MEDICINE

## 2023-12-21 PROCEDURE — 1126F AMNT PAIN NOTED NONE PRSNT: CPT | Performed by: INTERNAL MEDICINE

## 2023-12-21 PROCEDURE — 1036F TOBACCO NON-USER: CPT | Performed by: INTERNAL MEDICINE

## 2023-12-21 PROCEDURE — G0438 PPPS, INITIAL VISIT: HCPCS | Performed by: INTERNAL MEDICINE

## 2023-12-21 PROCEDURE — 3078F DIAST BP <80 MM HG: CPT | Performed by: INTERNAL MEDICINE

## 2023-12-21 PROCEDURE — 1160F RVW MEDS BY RX/DR IN RCRD: CPT | Performed by: INTERNAL MEDICINE

## 2023-12-21 RX ORDER — TADALAFIL 20 MG/1
20 TABLET ORAL DAILY PRN
Qty: 30 TABLET | Refills: 1 | Status: SHIPPED | OUTPATIENT
Start: 2023-12-21 | End: 2024-02-19

## 2023-12-21 ASSESSMENT — PAIN SCALES - GENERAL: PAINLEVEL: 0-NO PAIN

## 2024-01-03 ENCOUNTER — TELEPHONE (OUTPATIENT)
Dept: ORTHOPEDIC SURGERY | Facility: CLINIC | Age: 64
End: 2024-01-03
Payer: MEDICARE

## 2024-01-03 NOTE — TELEPHONE ENCOUNTER
3/14/24 lt tka  Patient is having a lot of pain in his LT calf. No other symptoms. Wants to know if this is due to the LT knee pain/arthritis?

## 2024-01-09 ENCOUNTER — ANCILLARY PROCEDURE (OUTPATIENT)
Dept: RADIOLOGY | Facility: HOSPITAL | Age: 64
End: 2024-01-09
Payer: MEDICARE

## 2024-01-09 ENCOUNTER — TREATMENT (OUTPATIENT)
Dept: PHYSICAL THERAPY | Facility: CLINIC | Age: 64
End: 2024-01-09
Payer: MEDICARE

## 2024-01-09 ENCOUNTER — OFFICE VISIT (OUTPATIENT)
Dept: ORTHOPEDIC SURGERY | Facility: CLINIC | Age: 64
End: 2024-01-09
Payer: MEDICARE

## 2024-01-09 VITALS — HEIGHT: 69 IN | BODY MASS INDEX: 30.51 KG/M2 | WEIGHT: 206 LBS

## 2024-01-09 DIAGNOSIS — M17.11 PRIMARY LOCALIZED OSTEOARTHROSIS OF RIGHT LOWER LEG: Primary | ICD-10-CM

## 2024-01-09 DIAGNOSIS — M79.661 RIGHT CALF PAIN: ICD-10-CM

## 2024-01-09 DIAGNOSIS — M79.661 RIGHT CALF PAIN: Primary | ICD-10-CM

## 2024-01-09 PROCEDURE — 1036F TOBACCO NON-USER: CPT

## 2024-01-09 PROCEDURE — 97140 MANUAL THERAPY 1/> REGIONS: CPT | Mod: GP | Performed by: PHYSICAL THERAPIST

## 2024-01-09 PROCEDURE — 1159F MED LIST DOCD IN RCRD: CPT

## 2024-01-09 PROCEDURE — 97110 THERAPEUTIC EXERCISES: CPT | Mod: GP | Performed by: PHYSICAL THERAPIST

## 2024-01-09 PROCEDURE — 99212 OFFICE O/P EST SF 10 MIN: CPT

## 2024-01-09 PROCEDURE — 93971 EXTREMITY STUDY: CPT | Performed by: RADIOLOGY

## 2024-01-09 PROCEDURE — 93971 EXTREMITY STUDY: CPT

## 2024-01-09 PROCEDURE — 1160F RVW MEDS BY RX/DR IN RCRD: CPT

## 2024-01-09 PROCEDURE — 1126F AMNT PAIN NOTED NONE PRSNT: CPT

## 2024-01-09 NOTE — PROGRESS NOTES
Physical Therapy Treatment/Discharge Summary    Patient Name: Leonel Ceballos  MRN: [id number]  Today's Date: 1/9/2024  Time Calculation  Start Time: 1220  Stop Time: 1310  Time Calculation (min): 50 min    Current Problem  1. Primary localized osteoarthrosis of right lower leg            Insurance:  Number of Treatments Authorized: 20 of [employer]  Certification Period Start Date: 12/05/23  Certification Period End Date: 03/04/24  Payor: [employer] / Plan: [employer]ICARE / Product Type: *No Product type* /     Subjective   General  Reason for Referral: R knee pain s/p R TKA  Referred By: Dr. Rodgers  General Comment: Patient reports no new complaints this visit. No increased pain or adverse responses following his previous visit.    Performing HEP?: Yes    Precautions  Precautions  Precautions Comment: None  Pain       Objective   KNEE    Functional Rating Scale     Observation  Observation Comment: Diffuse swelling R > L knee; Girth: Med Jt line: R 39.5cm, L 38.5cm; Slight diffuse swelling noted in L lower leg; Girth 15cm distal med. jt line: R 37cm, L 37.8cm  Knee Palpation/Joint Mobility  Palpation/Joint Mobility Comment: No tenderness R/L knee; Tenderness L gastroc muscle belly, soleus and midline along venous path  Knee AROM  R knee flexion: (140°): 125°  L knee flexion: (140°): 127°  R knee extension: (0°): 2°  L knee extension: (0°): 2°       Knee MMT  R knee flexion: (5/5): 5/5  L knee flexion: (5/5): 4+/5  R knee extension: (5/5): 5/5  L knee extension: (5/5): 4+/5    Treatments:    Therapeutic Exercise  Therapeutic Exercise Activity 1: SciFit (Seat 13), L4, 5 mins  Therapeutic Exercise Activity 2: Dynamics: High knees, Butt kicks, tin soldiers, hip openers, hip closers, x 40 feet each  Therapeutic Exercise Activity 3: Gastroc Stretch, incline, 3x30 secs  Therapeutic Exercise Activity 4: SL Leg Press: 3x10 w 75#  Therapeutic Exercise Activity 5: DL Leg Press: 3x10 w  150#  Therapeutic Exercise Activity 6: HEP Education and demonstration with sets and reps as noted. Pt with good understanding and demonstration.  Therapeutic Exercise Activity 7: SL RDL: 2x10 w 10# at [employer]         Manual Therapy  Manual Therapy Activity 1: STM L gastroc/soleus      OP EDUCATION:  Outpatient Education  Individual(s) Educated: Patient  Education Provided: Home Exercise Program  Patient/Caregiver Demonstrated Understanding: yes  Education Comment: Access Code: [email or web address]  URL: [email or web address]  Date: 01/09/2024  Prepared by: Osei Patton    Exercises  - Full Leg Press  - 1 x daily - 3-4 x weekly - 3 sets - 10 reps  - Single Leg Press  - 1 x daily - 3-4 x weekly - 3 sets - 10 reps  - Single Leg Deadlift with Kettlebell  - 1 x daily - 3-4 x weekly - 3 sets - 10 reps  - Forward Step Down  - 1 x daily - 3-4 x weekly - 3 sets - 10 reps  - Runner's Step Up on BOSU® Ball  - 1 x daily - 3-4 x weekly - 3 sets - 10 reps  - Lateral Step Up and Overs on BOSU  - 1 x daily - 3-4 x weekly - 1-2 sets - 10 reps  - Kettlebell Suitcase Carry  - 1 x daily - 3-4 x weekly - 4 reps    Assessment:  PT Assessment  Assessment Comment: Patient with improvement overall in range of motion, strength, and tolerance to functional tasks for weightbearing ADLs involving right knee.  He continues to have some slight swelling at the joint but overall has been able to maintain with HEP over the past month.  Patient's primary limiting factor at this time is his left knee pain which she is scheduled for surgery and TKA on 3/14/2024.  He is also having increased pain and tenderness in the left proximal gastroc/soleus region.  He has been evaluated later today at the orthopedic doctors office.  Patient to continue with current strengthening program on his own at this time until possibly returning following left TKA.    Plan:     PT Plan: No Additional PT interventions required at this time, Skilled PT (Prognosis  was good at time of discharge. Client understanding good at time of DC. Recommend DC to HEP. Pt to contact PT with any questions or concerns.)        Onset Date: 01/01/23       Goals:  STG (Expected End 10/30/2023)  1) Patient will improve LEFS score by 9 points in order to perform functional activities at home and in the community.  2) Patient will be able to complete ADLs with pain in knee less than 4/10. (GOAL MET)  3) Pt will improve knee flexion ROM to 120 degrees to be able to complete ADLs with less difficulty. (GOAL MET)  4) Patient will be independent with HEP to allow for continued improvement in daily tasks at home and in the community in 3 visits.  (GOAL MET)  LTG (Expected End 11/27/2023)  1) Patient will have 5/5 strength in hip musculature to aid in balance with ambulation on varied surfaces in community in 8 weeks (GOAL PARTIALLY MET)  2) Patient will be able to perform proper squatting technique in order to reduce compression on knee and prevent increased pain with daily tasks in 8 weeks. (GOAL NOT TESTED)  3) Patient will be able to perform >30 seconds of SLS on even ground in order to allow for safe ambulation on all levels and to reduce fall risk within the community in 8 weeks. (GOAL NOT TESTED)  4) Patient will improve LEFS score >/=70/80 points in order to perform functional activities at home and in the community by discharge. (GOAL PARTIALLY MET)Gulshan Patton, PT

## 2024-01-10 ENCOUNTER — TELEPHONE (OUTPATIENT)
Dept: ORTHOPEDIC SURGERY | Facility: CLINIC | Age: 64
End: 2024-01-10
Payer: MEDICARE

## 2024-01-10 NOTE — PROGRESS NOTES
HPI  Leonel Ceballos is a 63 y.o. male  in office today for        Chief Complaint   Patient presents with    Left Lower Leg - Pain       He has had pain in his calf area that he has had for a couple month, denies injury. He was wondering if this is due to his knee being replaced in march or something.    .  he has had pain since September when he had the right knee replaced.  Wonders also if it may just be excess fluid from swelling        Medication         Current Outpatient Medications on File Prior to Visit   Medication Sig Dispense Refill    ascorbic acid (Vitamin C) 1,000 mg tablet Take 1 tablet (1,000 mg) by mouth once daily.        aspirin 81 mg EC tablet TAKE 1 TABLET BY MOUTH TWO TIMES A DAY 60 tablet 0    atorvastatin (Lipitor) 10 mg tablet Take 1 tablet (10 mg) by mouth once daily.        cetirizine (ZyrTEC) 10 mg capsule Take 1 capsule (10 mg) by mouth once daily.        DAILY MULTI-VITAMIN ORAL Take 1 tablet by mouth once daily.        losartan-hydrochlorothiazide (Hyzaar) 50-12.5 mg tablet Take 1 tablet by mouth once daily. For 90 days        meloxicam (Mobic) 15 mg tablet Take 1 tablet (15 mg) by mouth once daily as needed.        methylsulfonylmethane (MSM ORAL) Take 1 capsule by mouth once daily. 3000 mg        multivitamin with minerals (multivitamin with folic acid) tablet Take 1 tablet by mouth once daily.        omeprazole (PriLOSEC) 20 mg DR capsule Take 1 capsule (20 mg) by mouth every other day.        tadalafil (Cialis) 20 mg tablet Take 1 tablet (20 mg) by mouth once daily as needed for erectile dysfunction. 30 tablet 1      No current facility-administered medications on file prior to visit.         Physical Exam  Constitutional: well developed, well nourished male in no acute distress  Psychiatric: normal mood, appropriate affect  Eyes: sclera anicteric  HENT: normocephalic/atraumatic  CV: regular rate and rhythm   Respiratory: non labored breathing  Integumentary: no rash  Neurological:  moves all extremities     Left Knee Exam      Tenderness   The patient is experiencing tenderness in the medial joint line and lateral joint line (lateral, proximal calf).     Range of Motion   The patient has normal left knee ROM.     Other   Erythema: absent  Scars: absent  Sensation: normal  Swelling: mild  Effusion: effusion present     Comments:  Mild knee and calf swelling in comparison to the left  Suprapetallar effusion medial side of knee.                 Imaging/Lab:  X-rays were taken 12/13/23 which were reviewed by myself and read by radiology and show severe medial compartment osteoarthrosis.  Small to moderated and likely reactive knee joint effusion.    Ultrasound completed today which shows no DVT,  Complex fluid collection int he left popliteal fossa which may represent a cyst.        Assessment  Assessment: Left knee osteoarthritis, left calf pain     Plan  Plan:  History, physical exam, and imaging were reviewed with patient. Also received assessment from PT on the calf pain.  Swelling mild so risk for DVT is low, but want to rule out any issues that could effect the knee from being replaced in 2 months.  US orders entered. Discussed possible effusion and aspiration as another option.  There is some evident swelling superior to the knee joint.  Follow Up: Called and left message with US results.  If he wants to try to aspirate the fluid behind the knee, can enter orders to have it aspirated under guidance     All questions were answered for the patient prior to end of exam and patient addressed their understanding.     Mariel Frank PA-C  01/10/24

## 2024-01-15 ENCOUNTER — TELEPHONE (OUTPATIENT)
Dept: ORTHOPEDIC SURGERY | Facility: CLINIC | Age: 64
End: 2024-01-15
Payer: MEDICARE

## 2024-01-15 DIAGNOSIS — M25.562 CHRONIC PAIN OF LEFT KNEE: Primary | ICD-10-CM

## 2024-01-15 DIAGNOSIS — G89.29 CHRONIC PAIN OF LEFT KNEE: Primary | ICD-10-CM

## 2024-01-15 NOTE — TELEPHONE ENCOUNTER
1/9/24 lt knee pain  Patient called back after he got [facility] message. He does want the knee to be aspirated under guidance. Can we place an order? Will call patient once placed.

## 2024-01-18 ENCOUNTER — LAB (OUTPATIENT)
Dept: LAB | Facility: LAB | Age: 64
End: 2024-01-18
Payer: MEDICARE

## 2024-01-18 DIAGNOSIS — I10 PRIMARY HYPERTENSION: ICD-10-CM

## 2024-01-18 DIAGNOSIS — Z01.89 ENCOUNTER FOR ROUTINE LABORATORY TESTING: ICD-10-CM

## 2024-01-18 DIAGNOSIS — E55.9 VITAMIN D DEFICIENCY: ICD-10-CM

## 2024-01-18 DIAGNOSIS — E78.2 MIXED HYPERLIPIDEMIA: ICD-10-CM

## 2024-01-18 DIAGNOSIS — E34.9 TESTOSTERONE DEFICIENCY: ICD-10-CM

## 2024-01-18 DIAGNOSIS — Z12.5 ENCOUNTER FOR PROSTATE CANCER SCREENING: ICD-10-CM

## 2024-01-18 DIAGNOSIS — R73.9 HYPERGLYCEMIA: ICD-10-CM

## 2024-01-18 LAB
25(OH)D3 SERPL-MCNC: 21 NG/ML (ref 31–100)
ALBUMIN SERPL-MCNC: 4.2 G/DL (ref 3.5–5)
ALP BLD-CCNC: 58 U/L (ref 35–125)
ALT SERPL-CCNC: 36 U/L (ref 5–40)
ANION GAP SERPL CALC-SCNC: 11 MMOL/L
AST SERPL-CCNC: 30 U/L (ref 5–40)
BASOPHILS # BLD AUTO: 0.07 X10*3/UL (ref 0–0.1)
BASOPHILS NFR BLD AUTO: 0.8 %
BILIRUB SERPL-MCNC: 0.6 MG/DL (ref 0.1–1.2)
BUN SERPL-MCNC: 13 MG/DL (ref 8–25)
CALCIUM SERPL-MCNC: 10.3 MG/DL (ref 8.5–10.4)
CHLORIDE SERPL-SCNC: 101 MMOL/L (ref 97–107)
CHOLEST SERPL-MCNC: 155 MG/DL (ref 133–200)
CHOLEST/HDLC SERPL: 4.7 {RATIO}
CO2 SERPL-SCNC: 26 MMOL/L (ref 24–31)
CREAT SERPL-MCNC: 0.9 MG/DL (ref 0.4–1.6)
EGFRCR SERPLBLD CKD-EPI 2021: >90 ML/MIN/1.73M*2
EOSINOPHIL # BLD AUTO: 0.24 X10*3/UL (ref 0–0.7)
EOSINOPHIL NFR BLD AUTO: 2.9 %
ERYTHROCYTE [DISTWIDTH] IN BLOOD BY AUTOMATED COUNT: 12.7 % (ref 11.5–14.5)
EST. AVERAGE GLUCOSE BLD GHB EST-MCNC: 123 MG/DL
GLUCOSE SERPL-MCNC: 105 MG/DL (ref 65–99)
HBA1C MFR BLD: 5.9 %
HCT VFR BLD AUTO: 47.7 % (ref 41–52)
HDLC SERPL-MCNC: 33 MG/DL
HGB BLD-MCNC: 15.6 G/DL (ref 13.5–17.5)
IMM GRANULOCYTES # BLD AUTO: 0.05 X10*3/UL (ref 0–0.7)
IMM GRANULOCYTES NFR BLD AUTO: 0.6 % (ref 0–0.9)
LDLC SERPL CALC-MCNC: 91 MG/DL (ref 65–130)
LYMPHOCYTES # BLD AUTO: 1.96 X10*3/UL (ref 1.2–4.8)
LYMPHOCYTES NFR BLD AUTO: 23.8 %
MCH RBC QN AUTO: 30 PG (ref 26–34)
MCHC RBC AUTO-ENTMCNC: 32.7 G/DL (ref 32–36)
MCV RBC AUTO: 92 FL (ref 80–100)
MONOCYTES # BLD AUTO: 0.91 X10*3/UL (ref 0.1–1)
MONOCYTES NFR BLD AUTO: 11 %
NEUTROPHILS # BLD AUTO: 5.02 X10*3/UL (ref 1.2–7.7)
NEUTROPHILS NFR BLD AUTO: 60.9 %
NRBC BLD-RTO: 0 /100 WBCS (ref 0–0)
PLATELET # BLD AUTO: 266 X10*3/UL (ref 150–450)
POTASSIUM SERPL-SCNC: 4.3 MMOL/L (ref 3.4–5.1)
PROT SERPL-MCNC: 6.7 G/DL (ref 5.9–7.9)
PSA SERPL-MCNC: 1.5 NG/ML
RBC # BLD AUTO: 5.2 X10*6/UL (ref 4.5–5.9)
SODIUM SERPL-SCNC: 138 MMOL/L (ref 133–145)
TESTOST SERPL-MCNC: 530 NG/DL (ref 240–1000)
TRIGL SERPL-MCNC: 156 MG/DL (ref 40–150)
TSH SERPL DL<=0.05 MIU/L-ACNC: 3.23 MIU/L (ref 0.27–4.2)
WBC # BLD AUTO: 8.3 X10*3/UL (ref 4.4–11.3)

## 2024-01-18 PROCEDURE — 84403 ASSAY OF TOTAL TESTOSTERONE: CPT

## 2024-01-18 PROCEDURE — 84443 ASSAY THYROID STIM HORMONE: CPT

## 2024-01-18 PROCEDURE — 80053 COMPREHEN METABOLIC PANEL: CPT

## 2024-01-18 PROCEDURE — 80061 LIPID PANEL: CPT

## 2024-01-18 PROCEDURE — 85025 COMPLETE CBC W/AUTO DIFF WBC: CPT

## 2024-01-18 PROCEDURE — 82306 VITAMIN D 25 HYDROXY: CPT

## 2024-01-18 PROCEDURE — 36415 COLL VENOUS BLD VENIPUNCTURE: CPT

## 2024-01-18 PROCEDURE — G0103 PSA SCREENING: HCPCS

## 2024-01-18 PROCEDURE — 83036 HEMOGLOBIN GLYCOSYLATED A1C: CPT

## 2024-01-23 ENCOUNTER — HOSPITAL ENCOUNTER (OUTPATIENT)
Dept: RADIOLOGY | Facility: HOSPITAL | Age: 64
End: 2024-01-23
Payer: MEDICARE

## 2024-01-23 ENCOUNTER — HOSPITAL ENCOUNTER (OUTPATIENT)
Dept: RADIOLOGY | Facility: HOSPITAL | Age: 64
Discharge: HOME | End: 2024-01-23
Payer: MEDICARE

## 2024-01-23 ENCOUNTER — TELEPHONE (OUTPATIENT)
Dept: ORTHOPEDIC SURGERY | Facility: CLINIC | Age: 64
End: 2024-01-23

## 2024-01-23 DIAGNOSIS — M25.562 CHRONIC PAIN OF LEFT KNEE: ICD-10-CM

## 2024-01-23 DIAGNOSIS — G89.29 CHRONIC PAIN OF LEFT KNEE: ICD-10-CM

## 2024-01-23 PROCEDURE — 76882 US LMTD JT/FCL EVL NVASC XTR: CPT

## 2024-01-23 PROCEDURE — 76882 US LMTD JT/FCL EVL NVASC XTR: CPT | Performed by: RADIOLOGY

## 2024-01-23 RX ORDER — BUPIVACAINE HYDROCHLORIDE 2.5 MG/ML
INJECTION, SOLUTION EPIDURAL; INFILTRATION; INTRACAUDAL
Status: DISCONTINUED
Start: 2024-01-23 | End: 2024-01-23 | Stop reason: WASHOUT

## 2024-01-23 RX ORDER — TRIAMCINOLONE ACETONIDE 40 MG/ML
INJECTION, SUSPENSION INTRA-ARTICULAR; INTRAMUSCULAR
Status: DISCONTINUED
Start: 2024-01-23 | End: 2024-01-23 | Stop reason: WASHOUT

## 2024-01-23 NOTE — PRE-PROCEDURE NOTE
Interventional Radiology Preprocedure Note    Indication for procedure: The encounter diagnosis was Chronic pain of left knee.    Relevant review of systems: NA    Relevant Labs:   Lab Results   Component Value Date    CREATININE 0.90 01/18/2024    EGFR >90 01/18/2024       Planned Sedation/Anesthesia: Minimal    Airway assessment:  NA    Directed physical examination:    NA    Mallampati:  NA    ASA Score: ASA 1 - Normal health patient    Benefits, risks and alternatives of procedure and planned sedation have been discussed with the patient and/or their representative. All questions answered and they agree to proceed.

## 2024-01-23 NOTE — TELEPHONE ENCOUNTER
Patient stopped in said they canceled the surgery he was suppose to have. It was to risky. He wanted to know if he could get anything for the pain since he cannot have the surgery.

## 2024-01-24 DIAGNOSIS — M25.561 ARTHRALGIA OF RIGHT KNEE: Primary | ICD-10-CM

## 2024-01-24 DIAGNOSIS — E78.5 HYPERLIPIDEMIA, UNSPECIFIED: ICD-10-CM

## 2024-01-24 RX ORDER — ATORVASTATIN CALCIUM 10 MG/1
10 TABLET, FILM COATED ORAL DAILY
Qty: 90 TABLET | Refills: 3 | Status: SHIPPED | OUTPATIENT
Start: 2024-01-24 | End: 2025-01-23

## 2024-01-24 RX ORDER — MELOXICAM 15 MG/1
15 TABLET ORAL DAILY PRN
Qty: 30 TABLET | Refills: 1 | Status: SHIPPED | OUTPATIENT
Start: 2024-01-24 | End: 2024-03-13

## 2024-01-25 NOTE — TELEPHONE ENCOUNTER
Patient did not want his surgery canceled. Patient went and tried to have the aspiration done and they were not able to complete it. He was told that a MRI should be done. So that is why he wrote you Mariel to see if this is something he should complete while awaiting his surgery?. He would like a call if a sooner surgery date becomes available.

## 2024-01-29 DIAGNOSIS — M25.562 CHRONIC PAIN OF LEFT KNEE: Primary | ICD-10-CM

## 2024-01-29 DIAGNOSIS — G89.29 CHRONIC PAIN OF LEFT KNEE: Primary | ICD-10-CM

## 2024-02-12 ENCOUNTER — HOSPITAL ENCOUNTER (OUTPATIENT)
Dept: RADIOLOGY | Facility: CLINIC | Age: 64
Discharge: HOME | End: 2024-02-12
Payer: MEDICARE

## 2024-02-12 DIAGNOSIS — M25.562 CHRONIC PAIN OF LEFT KNEE: ICD-10-CM

## 2024-02-12 DIAGNOSIS — G89.29 CHRONIC PAIN OF LEFT KNEE: ICD-10-CM

## 2024-02-12 PROCEDURE — 73721 MRI JNT OF LWR EXTRE W/O DYE: CPT | Mod: LT

## 2024-03-04 ENCOUNTER — APPOINTMENT (OUTPATIENT)
Dept: OTOLARYNGOLOGY | Facility: CLINIC | Age: 64
End: 2024-03-04
Payer: MEDICARE

## 2024-03-05 ENCOUNTER — PRE-ADMISSION TESTING (OUTPATIENT)
Dept: PREADMISSION TESTING | Facility: HOSPITAL | Age: 64
End: 2024-03-05
Payer: MEDICARE

## 2024-03-05 VITALS
WEIGHT: 207 LBS | BODY MASS INDEX: 30.66 KG/M2 | HEART RATE: 59 BPM | OXYGEN SATURATION: 97 % | HEIGHT: 69 IN | RESPIRATION RATE: 16 BRPM | SYSTOLIC BLOOD PRESSURE: 149 MMHG | TEMPERATURE: 97.3 F | DIASTOLIC BLOOD PRESSURE: 79 MMHG

## 2024-03-05 DIAGNOSIS — Z01.818 PREOPERATIVE TESTING: Primary | ICD-10-CM

## 2024-03-05 LAB
ANION GAP SERPL CALC-SCNC: 8 MMOL/L
APPEARANCE UR: CLEAR
BACTERIA #/AREA URNS AUTO: ABNORMAL /HPF
BILIRUB UR STRIP.AUTO-MCNC: NEGATIVE MG/DL
BUN SERPL-MCNC: 14 MG/DL (ref 8–25)
CALCIUM SERPL-MCNC: 9.6 MG/DL (ref 8.5–10.4)
CHLORIDE SERPL-SCNC: 103 MMOL/L (ref 97–107)
CO2 SERPL-SCNC: 28 MMOL/L (ref 24–31)
COLOR UR: ABNORMAL
CREAT SERPL-MCNC: 0.9 MG/DL (ref 0.4–1.6)
EGFRCR SERPLBLD CKD-EPI 2021: >90 ML/MIN/1.73M*2
ERYTHROCYTE [DISTWIDTH] IN BLOOD BY AUTOMATED COUNT: 12.3 % (ref 11.5–14.5)
GLUCOSE SERPL-MCNC: 115 MG/DL (ref 65–99)
GLUCOSE UR STRIP.AUTO-MCNC: NORMAL MG/DL
HCT VFR BLD AUTO: 44.7 % (ref 41–52)
HGB BLD-MCNC: 14.9 G/DL (ref 13.5–17.5)
HYALINE CASTS #/AREA URNS AUTO: ABNORMAL /LPF
KETONES UR STRIP.AUTO-MCNC: NEGATIVE MG/DL
LEUKOCYTE ESTERASE UR QL STRIP.AUTO: NEGATIVE
MCH RBC QN AUTO: 30 PG (ref 26–34)
MCHC RBC AUTO-ENTMCNC: 33.3 G/DL (ref 32–36)
MCV RBC AUTO: 90 FL (ref 80–100)
MUCOUS THREADS #/AREA URNS AUTO: ABNORMAL /LPF
NITRITE UR QL STRIP.AUTO: NEGATIVE
NRBC BLD-RTO: 0 /100 WBCS (ref 0–0)
PH UR STRIP.AUTO: 5.5 [PH]
PLATELET # BLD AUTO: 241 X10*3/UL (ref 150–450)
POTASSIUM SERPL-SCNC: 4.3 MMOL/L (ref 3.4–5.1)
PROT UR STRIP.AUTO-MCNC: ABNORMAL MG/DL
RBC # BLD AUTO: 4.97 X10*6/UL (ref 4.5–5.9)
RBC # UR STRIP.AUTO: ABNORMAL /UL
RBC #/AREA URNS AUTO: ABNORMAL /HPF
SODIUM SERPL-SCNC: 139 MMOL/L (ref 133–145)
SP GR UR STRIP.AUTO: 1.01
UROBILINOGEN UR STRIP.AUTO-MCNC: NORMAL MG/DL
WBC # BLD AUTO: 7.8 X10*3/UL (ref 4.4–11.3)
WBC #/AREA URNS AUTO: ABNORMAL /HPF

## 2024-03-05 PROCEDURE — 81001 URINALYSIS AUTO W/SCOPE: CPT

## 2024-03-05 PROCEDURE — 99204 OFFICE O/P NEW MOD 45 MIN: CPT | Performed by: NURSE PRACTITIONER

## 2024-03-05 PROCEDURE — 85027 COMPLETE CBC AUTOMATED: CPT

## 2024-03-05 PROCEDURE — 87081 CULTURE SCREEN ONLY: CPT | Mod: TRILAB,WESLAB

## 2024-03-05 PROCEDURE — 36415 COLL VENOUS BLD VENIPUNCTURE: CPT

## 2024-03-05 PROCEDURE — 93005 ELECTROCARDIOGRAM TRACING: CPT

## 2024-03-05 PROCEDURE — 80048 BASIC METABOLIC PNL TOTAL CA: CPT

## 2024-03-05 RX ORDER — CHLORHEXIDINE GLUCONATE ORAL RINSE 1.2 MG/ML
SOLUTION DENTAL
Qty: 473 ML | Refills: 0 | Status: SHIPPED | OUTPATIENT
Start: 2024-03-05

## 2024-03-05 RX ORDER — TAMSULOSIN HYDROCHLORIDE 0.4 MG/1
0.4 CAPSULE ORAL NIGHTLY
COMMUNITY
Start: 2024-01-21

## 2024-03-05 RX ORDER — CHOLECALCIFEROL (VITAMIN D3) 125 MCG
1 CAPSULE ORAL 2 TIMES DAILY PRN
COMMUNITY

## 2024-03-05 ASSESSMENT — DUKE ACTIVITY SCORE INDEX (DASI)
CAN YOU DO MODERATE WORK AROUND THE HOUSE LIKE VACUUMING, SWEEPING FLOORS OR CARRYING GROCERIES: YES
CAN YOU PARTICIPATE IN STRENOUS SPORTS LIKE SWIMMING, SINGLES TENNIS, FOOTBALL, BASKETBALL, OR SKIING: NO
TOTAL_SCORE: 34.7
CAN YOU TAKE CARE OF YOURSELF (EAT, DRESS, BATHE, OR USE TOILET): YES
CAN YOU DO YARD WORK LIKE RAKING LEAVES, WEEDING OR PUSHING A MOWER: YES
CAN YOU CLIMB A FLIGHT OF STAIRS OR WALK UP A HILL: YES
CAN YOU WALK A BLOCK OR TWO ON LEVEL GROUND: YES
CAN YOU HAVE SEXUAL RELATIONS: YES
CAN YOU RUN A SHORT DISTANCE: NO
CAN YOU DO HEAVY WORK AROUND THE HOUSE LIKE SCRUBBING FLOORS OR LIFTING AND MOVING HEAVY FURNITURE: NO
CAN YOU DO LIGHT WORK AROUND THE HOUSE LIKE DUSTING OR WASHING DISHES: YES
CAN YOU PARTICIPATE IN MODERATE RECREATIONAL ACTIVITIES LIKE GOLF, BOWLING, DANCING, DOUBLES TENNIS OR THROWING A BASEBALL OR FOOTBALL: YES
DASI METS SCORE: 7
CAN YOU WALK INDOORS, SUCH AS AROUND YOUR HOUSE: YES

## 2024-03-05 ASSESSMENT — CHADS2 SCORE
DIABETES: NO
AGE GREATER THAN OR EQUAL TO 75: NO
HYPERTENSION: YES
PRIOR STROKE OR TIA OR THROMBOEMBOLISM: NO
CHF: NO
CHADS2 SCORE: 1

## 2024-03-05 ASSESSMENT — ENCOUNTER SYMPTOMS
PSYCHIATRIC NEGATIVE: 1
GASTROINTESTINAL NEGATIVE: 1
EYES NEGATIVE: 1
CARDIOVASCULAR NEGATIVE: 1
ACTIVITY CHANGE: 1
ARTHRALGIAS: 1
RESPIRATORY NEGATIVE: 1

## 2024-03-05 ASSESSMENT — PAIN - FUNCTIONAL ASSESSMENT: PAIN_FUNCTIONAL_ASSESSMENT: 0-10

## 2024-03-05 ASSESSMENT — PAIN DESCRIPTION - DESCRIPTORS: DESCRIPTORS: DULL

## 2024-03-05 ASSESSMENT — PAIN SCALES - GENERAL: PAINLEVEL_OUTOF10: 6

## 2024-03-05 NOTE — PREPROCEDURE INSTRUCTIONS
PAT DISCHARGE INSTRUCTIONS    Please call the Same Day Surgery (SDS) Department of the hospital where your procedure will be performed after 2:00 PM the day before your surgery. If you are scheduled on a Monday, or a Tuesday following a Monday holiday, you will need to call on the last business day prior to your surgery.    [facility]  [address], [address]  [phone]    [facility]  [address] [place] [address] [phone]    [facility]  [address]  [place] [id number]  [phone]    Please let your surgeon know if:      You develop any open sores, shingles, burning or painful urination as these may increase your risk of an infection.   You no longer wish to have the surgery.   Any other personal circumstances change that may lead to the need to cancel or defer this surgery-such as being sick or getting admitted to any hospital within one week of your planned procedure.    Your contact details change, such as a change of address or phone number.    Starting now:     Please DO NOT drink alcohol or smoke for 24 hours before surgery. It is well known that quitting smoking can make a huge difference to your health and recovery from surgery. The longer you abstain from smoking, the better your chances of a healthy recovery. If you need help with quitting, call 9-800-QUIT-NOW to be connected to a trained counselor who will discuss the best methods to help you quit.     Before your surgery:    Please stop all supplements 7 days prior to surgery. Or as directed by your surgeon.   Please stop taking NSAID pain medicine such as Advil and Motrin 7 days before surgery.    If you develop any fever, cough, cold, rashes, cuts, scratches, scrapes, urinary symptoms or infection anywhere on your body (including teeth and gums) prior to surgery, please call your surgeon’s office as soon  as possible. This may require treatment to reduce the chance of cancellation on the day of surgery.    The day before your surgery:   DIET- Do not eat or drink anything after midnight the night before surgery, including mints, candy and gum.   Get a good night’s rest.  Use the special soap for bathing if you have been instructed to use one.    Scheduled surgery times may change and you will be notified if this occurs - please check your personal voicemail for any updates.     On the morning of surgery:   Wear comfortable, loose fitting clothes which open in the front. Please do not wear moisturizers, creams, lotions, makeup or perfume.    Please bring with you to surgery:   Photo ID and insurance card   Current list of medicines and allergies   Pacemaker/ Defibrillator/Heart stent cards   CPAP machine and mask    Slings/ splints/ crutches   A copy of your complete advanced directive/DHPOA.    Please do NOT bring with you to surgery:   All jewelry and valuables should be left at home.   Prosthetic devices such as contact lenses, hearing aids, dentures, eyelash extensions, hairpins and body piercings must be removed prior to going in to the surgical suite.    After outpatient surgery:   A responsible adult MUST accompany you at the time of discharge and stay with you for 24 hours after your surgery. You may NOT drive yourself home after surgery.    Do not drive, operate machinery, make critical decisions or do activities that require co-ordination or balance until after a night’s sleep.   Do not drink alcoholic beverages for 24 hours.   Instructions for resuming your medications will be provided by your surgeon.    CALL YOUR DOCTOR AFTER SURGERY IF YOU HAVE:     Chills and/or a fever of 101° F or higher.    Redness, swelling, pus or drainage from your surgical wound or a bad smell from the wound.    Lightheadedness, fainting or confusion.    Persistent vomiting (throwing up) and are not able to eat or drink for 12  hours.    Three or more loose, watery bowel movements in 24 hours (diarrhea).   Difficulty or pain while urinating( after non-urological surgery)    Pain and swelling in your legs, especially if it is only on one side.    Difficulty breathing or are breathing faster than normal.    Any new concerning symptoms.       Home Preoperative Antibacterial Shower    What is a home preoperative antibacterial shower?  This shower is a way of cleaning the skin with a germ killing solution before surgery. The solution contains chlorhexidine, commonly known as CHG. CHG is a skin cleanser with germ killing ability. Let your doctor know if you are allergic to chlorhexidine.      Why do I need to take a preoperative antibacterial shower?  Skin is not sterile. It is best to try to make your skin as free of germs as possible before surgery. Proper cleansing with a germ killing soap before surgery can lower the number of germs on your skin. This helps to reduce the risk of infection at the surgical site. Following the instructions listed below will help you prepare your skin for surgery.    How do I use the solution?      Steps: Begin using your CHG soap FIVE DAYS BEFORE your scheduled surgery on __________________________________________________.  First, wash and rinse your hair using the CHG soap. Keep CHG away soap away from ear canals and eyes.  Rinse completely, do not condition. Hair extensions should be removed.  Wash your face with your normal soap and rinse.  Apply the CHG solution to a clean wet washcloth. Turn the water off or move away from the water spray to avoid premature rinsing of the CHG soap as you are applying.  Firmly lather your entire body from neck down. Do not use on face.  Pay special attention to the areas(s) where your incision(s) will be located unless they are on your face.  Avoid scrubbing your skin too hard.  The important point is to have the CHG soap sit on your skin for 3 minutes.  DO NOT wash with  regular soap after you have used the CHG soap solution.  Pat yourself dry with a clean, freshly laundered towel.  DO NOT apply powders, deodorants or lotions.  Dress in clean, freshly laundered night clothes.  Be sure to sleep with clean, freshly laundered sheets.  Be aware that CHG will cause stains on fabrics; if you wash them with bleach after use. Rinse your washcloth and other linens that have contact with CHG completely. Use only non-chlorine detergents to launder the items used.  The morning of surgery is the fifth day. Repeat the above steps and dress in clean comfortable clothing.      Who should I call if I have any questions regarding the use of CHG soap?  Call the [facility] at [phone] if you have any questions.     Patient Information: Oral/Dental Rinse    What is oral/dental rinse?  It is a mouthwash. It is a way of cleaning the mouth with a germ killing solution before your surgery. The solution contains chlorhexidine, commonly know as CHG.  It is used inside the mouth to kill bacteria known as Staphylococcus aureus.  Let your doctor know if you are allergic to chlorhexidine.    Why do I need to use CHG oral/dental rinse?  The CHG oral/dental rinse helps to kill bacteria in your mouth known as Staphylococcus aureus. This reduces the risk of infection at the surgical site.    Using your CHG oral/dental rinse.  STEPS: use your CHG oral/dental rinse after you brush your teeth the night before (at bedtime) and the morning of your surgery. Follow the directions on your prescription label.  Use the cap on the container to measure 15ml (fill cap to fill line)  Swish (gargle if you can) the mouthwash in your mouth for at least 30 seconds, (do not swallow) spit out.  After you use your CHG rinse, do not rinse your mouth with water, drink or eat. Please refer to prescription label for the appropriate time to resume oral intake.    What  side effects might I have using the CHG oral/dental rinse?  CHG rinse will stick to the plaque on the teeth. Brush and floss just before use. Teeth brushing will help avoid staining of plaque during use.    Who should I contact if I have questions about the CHG oral/dental rinse?  Please call [facility] at [phone] if you have any questions.     Medication List            Accurate as of March 5, 2024  9:29 AM. Always use your most recent med list.                Aleve 220 mg capsule  Generic drug: naproxen sodium  Medication Adjustments for Surgery: Stop 7 days before surgery     ascorbic acid 1,000 mg tablet  Commonly known as: Vitamin C  Medication Adjustments for Surgery: Stop 7 days before surgery     aspirin 81 mg EC tablet  TAKE 1 TABLET BY MOUTH TWO TIMES A DAY  Notes to patient: NOT TAKING     atorvastatin 10 mg tablet  Commonly known as: Lipitor  Take 1 tablet (10 mg) by mouth once daily.  Medication Adjustments for Surgery: Take morning of surgery with sip of water, no other fluids     DAILY MULTI-VITAMIN ORAL  Medication Adjustments for Surgery: Stop 7 days before surgery     losartan-hydrochlorothiazide 50-12.5 mg tablet  Commonly known as: Hyzaar  Medication Adjustments for Surgery: Stop 1 day before surgery     meloxicam 15 mg tablet  Commonly known as: Mobic  TAKE 1 TABLET EVERY DAY AS NEEDED  Medication Adjustments for Surgery: Stop 7 days before surgery     MSM ORAL     omeprazole 20 mg DR capsule  Commonly known as: PriLOSEC  Medication Adjustments for Surgery: Take morning of surgery with sip of water, no other fluids     tadalafil 20 mg tablet  Commonly known as: Cialis  Take 1 tablet (20 mg) by mouth once daily as needed for erectile dysfunction.  Notes to patient: NOT TAKING     tamsulosin 0.4 mg 24 hr capsule  Commonly known as: Flomax  Notes to patient: Take evening dose before surgery.     ZyrTEC 10 mg capsule  Generic  drug: cetirizine  Medication Adjustments for Surgery: Stop 1 day before surgery

## 2024-03-05 NOTE — CPM/PAT H&P
CPM/PAT Evaluation       Name: Leonel Ceballos (Leonel Ceballos)  /Age: 1960/63 y.o.     In-Person       Chief Complaint: Chronic pain of left knee     HPI  A 63-year-old male with chronic pain of left knee.  History of progressive left knee pain over the past 5-6 years. Symptoms increase with prolonged standing, ambulation and stairs, interfering with ADLs and quality of life.  Conservative treatments not helping.  Denies fever, chills, chest pain, shortness of breath, syncope, or left lower extremity numbness.  He is scheduled for left open total knee arthroplasty.    Past Medical History:   Diagnosis Date    Atrial flutter (CMS/HCC) 2023    Hyperlipidemia 2023    Hypertension     Palpitations 2023    Primary osteoarthritis of both knees 2023    Right bundle branch block 2023       Past Surgical History:   Procedure Laterality Date    OTHER SURGICAL HISTORY  2023    Cardiac cath His ablation    OTHER SURGICAL HISTORY  2023    Knee surgery    TONSILLECTOMY      TOTAL KNEE ARTHROPLASTY Right 2023         No Known Allergies    Current Outpatient Medications   Medication Sig Dispense Refill    tamsulosin (Flomax) 0.4 mg 24 hr capsule Take 1 capsule (0.4 mg) by mouth once daily at bedtime.      ascorbic acid (Vitamin C) 1,000 mg tablet Take 1 tablet (1,000 mg) by mouth once daily.      aspirin 81 mg EC tablet TAKE 1 TABLET BY MOUTH TWO TIMES A DAY (Patient not taking: Reported on 3/5/2024) 60 tablet 0    atorvastatin (Lipitor) 10 mg tablet Take 1 tablet (10 mg) by mouth once daily. 90 tablet 3    cetirizine (ZyrTEC) 10 mg capsule Take 1 capsule (10 mg) by mouth once daily.      chlorhexidine (Peridex) 0.12 % solution Use cap to measure 15 mL.  Swish/gargle mouthwash for at least 30 seconds.  Do not swallow.  Use night before surgery after brushing teeth and morning of surgery after brushing teeth. 473 mL 0    DAILY MULTI-VITAMIN ORAL Take 1 tablet by mouth once daily.       losartan-hydrochlorothiazide (Hyzaar) 50-12.5 mg tablet Take 1 tablet by mouth once daily. For 90 days      meloxicam (Mobic) 15 mg tablet TAKE 1 TABLET EVERY DAY AS NEEDED 30 tablet 1    methylsulfonylmethane (MSM ORAL) Take 1 capsule by mouth once daily. 3000 mg      naproxen sodium (Aleve) 220 mg capsule Take 1 tablet by mouth 2 times a day as needed.      omeprazole (PriLOSEC) 20 mg DR capsule Take 1 capsule (20 mg) by mouth every other day.      tadalafil (Cialis) 20 mg tablet Take 1 tablet (20 mg) by mouth once daily as needed for erectile dysfunction. 30 tablet 1     No current facility-administered medications for this visit.       Review of Systems   Constitutional:  Positive for activity change.   HENT: Negative.     Eyes: Negative.         Glasses   Respiratory: Negative.     Cardiovascular: Negative.    Gastrointestinal: Negative.    Genitourinary: Negative.    Musculoskeletal:  Positive for arthralgias and gait problem.        Left knee pain   Skin: Negative.    Psychiatric/Behavioral: Negative.          Physical Exam  Vitals reviewed.   HENT:      Head: Normocephalic and atraumatic.      Mouth/Throat:      Mouth: Mucous membranes are moist.   Eyes:      Pupils: Pupils are equal, round, and reactive to light.      Comments: glasses   Cardiovascular:      Rate and Rhythm: Normal rate and regular rhythm.   Pulmonary:      Effort: Pulmonary effort is normal.      Breath sounds: Normal breath sounds.   Abdominal:      Palpations: Abdomen is soft.   Musculoskeletal:         General: Normal range of motion.      Cervical back: Normal range of motion.      Comments: Reports left knee pain non-radiating, uses a cane to aid ambulation   Skin:     General: Skin is warm and dry.   Neurological:      Mental Status: He is alert and oriented to person, place, and time.   Psychiatric:         Mood and Affect: Mood normal.          PAT AIRWAY:   Airway:     Mallampati::  I    Neck ROM::  Full  normal        BP  "149/79   Pulse 59   Temp 36.3 °C (97.3 °F) (Temporal)   Resp 16   Ht 1.753 m (5' 9\")   Wt 93.9 kg (207 lb)   SpO2 97%   BMI 30.57 kg/m²         Stop Bang Score 4     CHADS 2 score: 2.8%  DASI score: 34.7  METS score: 7  Revised cardiac risk index: 0.9%  ASA II  ARISCAT 1.6%  PAT orders CBC, BMP, UA, MRSA, EKG  EKG in PAT sinus bradycardia, right bundle branch block preliminary-awaiting confirmation read by cardiologist  Assessment and Plan:     Chronic pain of left knee Plan: Left open total knee arthroplasty.  History of a flutter status post cardiac ablation 2016 with Dr. Ruiz last visit 2018, follows now with pcp only  Hypertension  Dyslipidemia  Osteoarthritis  History of right bundle branch block  GERD  BMI 30.57    ADDENDUM:  Patient saw Dr. Pio Ruiz 3/8/2024 - echo showed normal EF 60-65%, trace to mild MV regurgitation - cardiac cleared for surgery.    "

## 2024-03-07 ENCOUNTER — HOSPITAL ENCOUNTER (OUTPATIENT)
Dept: RADIOLOGY | Facility: HOSPITAL | Age: 64
Discharge: HOME | End: 2024-03-07
Payer: MEDICARE

## 2024-03-07 ENCOUNTER — OFFICE VISIT (OUTPATIENT)
Dept: ORTHOPEDIC SURGERY | Facility: CLINIC | Age: 64
End: 2024-03-07
Payer: MEDICARE

## 2024-03-07 VITALS — WEIGHT: 200 LBS | BODY MASS INDEX: 29.62 KG/M2 | HEIGHT: 69 IN

## 2024-03-07 DIAGNOSIS — G89.29 CHRONIC PAIN OF LEFT KNEE: Primary | ICD-10-CM

## 2024-03-07 DIAGNOSIS — G89.29 CHRONIC PAIN OF LEFT KNEE: ICD-10-CM

## 2024-03-07 DIAGNOSIS — M25.562 CHRONIC PAIN OF LEFT KNEE: Primary | ICD-10-CM

## 2024-03-07 DIAGNOSIS — M25.562 CHRONIC PAIN OF LEFT KNEE: ICD-10-CM

## 2024-03-07 PROBLEM — I49.1 PAC (PREMATURE ATRIAL CONTRACTION): Status: ACTIVE | Noted: 2024-03-07

## 2024-03-07 PROBLEM — I42.8 NONISCHEMIC CARDIOMYOPATHY (MULTI): Status: ACTIVE | Noted: 2024-03-07

## 2024-03-07 PROBLEM — I48.0 PAF (PAROXYSMAL ATRIAL FIBRILLATION) (MULTI): Status: ACTIVE | Noted: 2024-03-07

## 2024-03-07 LAB — STAPHYLOCOCCUS SPEC CULT: ABNORMAL

## 2024-03-07 PROCEDURE — 1159F MED LIST DOCD IN RCRD: CPT | Performed by: ORTHOPAEDIC SURGERY

## 2024-03-07 PROCEDURE — 1160F RVW MEDS BY RX/DR IN RCRD: CPT | Performed by: ORTHOPAEDIC SURGERY

## 2024-03-07 PROCEDURE — 1126F AMNT PAIN NOTED NONE PRSNT: CPT | Performed by: ORTHOPAEDIC SURGERY

## 2024-03-07 PROCEDURE — 1036F TOBACCO NON-USER: CPT | Performed by: ORTHOPAEDIC SURGERY

## 2024-03-07 PROCEDURE — 77073 BONE LENGTH STUDIES: CPT

## 2024-03-07 PROCEDURE — 99213 OFFICE O/P EST LOW 20 MIN: CPT | Performed by: ORTHOPAEDIC SURGERY

## 2024-03-07 ASSESSMENT — PAIN - FUNCTIONAL ASSESSMENT: PAIN_FUNCTIONAL_ASSESSMENT: 0-10

## 2024-03-07 ASSESSMENT — ENCOUNTER SYMPTOMS
LOSS OF SENSATION IN FEET: 0
OCCASIONAL FEELINGS OF UNSTEADINESS: 0
DEPRESSION: 0

## 2024-03-07 ASSESSMENT — COLUMBIA-SUICIDE SEVERITY RATING SCALE - C-SSRS
1. IN THE PAST MONTH, HAVE YOU WISHED YOU WERE DEAD OR WISHED YOU COULD GO TO SLEEP AND NOT WAKE UP?: NO
6. HAVE YOU EVER DONE ANYTHING, STARTED TO DO ANYTHING, OR PREPARED TO DO ANYTHING TO END YOUR LIFE?: NO
2. HAVE YOU ACTUALLY HAD ANY THOUGHTS OF KILLING YOURSELF?: NO

## 2024-03-07 ASSESSMENT — PAIN SCALES - GENERAL: PAINLEVEL_OUTOF10: 3

## 2024-03-07 ASSESSMENT — PATIENT HEALTH QUESTIONNAIRE - PHQ9
2. FEELING DOWN, DEPRESSED OR HOPELESS: NOT AT ALL
1. LITTLE INTEREST OR PLEASURE IN DOING THINGS: NOT AT ALL
SUM OF ALL RESPONSES TO PHQ9 QUESTIONS 1 AND 2: 0

## 2024-03-07 NOTE — PROGRESS NOTES
No chief complaint on file.        This is a 63 y.o. year old male who presents for preoperative visit for his left total knee.  Patient has severe degenerative disease of the affected joint. The patient complains of severe pain in the area, the pain is gradually getting worse. She has failed extensive nonsurgical treatment including anti-inflammatories physical therapy use of assistive devices activity modification cortisone injections. Despite this interventions the patient is worsening pain which impacts her quality of life and activities of daily living and they would like to proceed with joint replacement.    Physical Exam    There has been no interval change in this patient's past medical, surgical, medications, allergies, family history or social history since the most recent visit to a provider within our department. 14 point review of systems was performed, reviewed, and negative except for pertinent positives documented in the history of present illness.     Constitutional: well developed, well nourished male in no acute distress  Psychiatric: normal mood, appropriate affect  Eyes: sclera anicteric  HENT: normocephalic/atraumatic  CV: regular rate and rhythm   Respiratory: non labored breathing  Integumentary: no rash  Neurological: moves all extremities    Pre-Admission Testing on 03/05/2024   Component Date Value Ref Range Status    Ventricular Rate 03/05/2024 58  BPM Preliminary    Atrial Rate 03/05/2024 58  BPM Preliminary    UT Interval 03/05/2024 164  ms Preliminary    QRS Duration 03/05/2024 148  ms Preliminary    QT Interval 03/05/2024 422  ms Preliminary    QTC Calculation(Bazett) 03/05/2024 414  ms Preliminary    P Axis 03/05/2024 20  degrees Preliminary    R Axis 03/05/2024 -26  degrees Preliminary    T Axis 03/05/2024 12  degrees Preliminary    QRS Count 03/05/2024 9  beats Preliminary    Q Onset 03/05/2024 224  ms Preliminary    P Onset 03/05/2024 142  ms Preliminary    P Offset 03/05/2024 203   ms Preliminary    T Offset 03/05/2024 435  ms Preliminary    QTC Fredericia 03/05/2024 417  ms Preliminary    Color, Urine 03/05/2024 Light-Yellow  Light-Yellow, Yellow, Dark-Yellow Final    Appearance, Urine 03/05/2024 Clear  Clear Final    Specific Gravity, Urine 03/05/2024 1.011  1.005 - 1.035 Final    pH, Urine 03/05/2024 5.5  5.0, 5.5, 6.0, 6.5, 7.0, 7.5, 8.0 Final    Protein, Urine 03/05/2024 10 (TRACE)  NEGATIVE, 10 (TRACE), 20 (TRACE) mg/dL Final    Glucose, Urine 03/05/2024 Normal  Normal mg/dL Final    Blood, Urine 03/05/2024 0.03 (TRACE) (A)  NEGATIVE Final    Ketones, Urine 03/05/2024 NEGATIVE  NEGATIVE mg/dL Final    Bilirubin, Urine 03/05/2024 NEGATIVE  NEGATIVE Final    Urobilinogen, Urine 03/05/2024 Normal  Normal mg/dL Final    Nitrite, Urine 03/05/2024 NEGATIVE  NEGATIVE Final    Leukocyte Esterase, Urine 03/05/2024 NEGATIVE  NEGATIVE Final    WBC 03/05/2024 7.8  4.4 - 11.3 x10*3/uL Final    nRBC 03/05/2024 0.0  0.0 - 0.0 /100 WBCs Final    RBC 03/05/2024 4.97  4.50 - 5.90 x10*6/uL Final    Hemoglobin 03/05/2024 14.9  13.5 - 17.5 g/dL Final    Hematocrit 03/05/2024 44.7  41.0 - 52.0 % Final    MCV 03/05/2024 90  80 - 100 fL Final    MCH 03/05/2024 30.0  26.0 - 34.0 pg Final    MCHC 03/05/2024 33.3  32.0 - 36.0 g/dL Final    RDW 03/05/2024 12.3  11.5 - 14.5 % Final    Platelets 03/05/2024 241  150 - 450 x10*3/uL Final    Glucose 03/05/2024 115 (H)  65 - 99 mg/dL Final    Sodium 03/05/2024 139  133 - 145 mmol/L Final    Potassium 03/05/2024 4.3  3.4 - 5.1 mmol/L Final    Chloride 03/05/2024 103  97 - 107 mmol/L Final    Bicarbonate 03/05/2024 28  24 - 31 mmol/L Final    Urea Nitrogen 03/05/2024 14  8 - 25 mg/dL Final    Creatinine 03/05/2024 0.90  0.40 - 1.60 mg/dL Final    eGFR 03/05/2024 >90  >60 mL/min/1.73m*2 Final    Calculations of estimated GFR are performed using the 2021 CKD-EPI Study Refit equation without the race variable for the IDMS-Traceable creatinine  methods.  https://jasn.asnjournals.org/content/early/2021/09/22/ASN.5167381261    Calcium 03/05/2024 9.6  8.5 - 10.4 mg/dL Final    Anion Gap 03/05/2024 8  <=19 mmol/L Final    Staph/MRSA Screen Culture 03/05/2024 Isolated: Methicillin Susceptible Staphylococcus aureus (MSSA) (A)   Final    WBC, Urine 03/05/2024 1-5  1-5, NONE /HPF Final    RBC, Urine 03/05/2024 1-2  NONE, 1-2, 3-5 /HPF Final    Bacteria, Urine 03/05/2024 3+ (A)  NONE SEEN /HPF Final    Mucus, Urine 03/05/2024 FEW  Reference range not established. /LPF Final    Hyaline Casts, Urine 03/05/2024 OCCASIONAL (A)  NONE /LPF Final         Left knee exam: skin intact no lacerations or abrations.  1+ effusion.  Tender medial joint line. negative log roll negative patellar grind. ROM 5-100. stable to varus and valgus stress at 0 and 30 degrees. negative lachman negative posterior drawer negative rufino. 5/5 ehl/fhl/gs/ta. silt s/s/sp/dp/t. 2+ dp/pt        Preoperative labs reviewed, no findings which would preclude surgery    Impression plan: This is a 63 y.o. yo malewith severe end-stage degenerative disease of the left knee that has failed nonoperative management.  Once again I discussed with the patient in detail the risks benefits and alternatives of total joint replacement. For the full details of that discussion see my previous note. The patient has obtained appropriate medical and dental clearance, and her labs have been reviewed. We will plan to proceed with surgery.    BMI Readings from Last 1 Encounters:   03/05/24 30.57 kg/m²     Lab Results   Component Value Date    CREATININE 0.90 03/05/2024     Tobacco Use: Low Risk  (3/5/2024)    Patient History     Smoking Tobacco Use: Never     Smokeless Tobacco Use: Never     Passive Exposure: Not on file      Computed MELD 3.0 unavailable. Necessary lab results were not found in the last year.  Computed MELD-Na unavailable. Necessary lab results were not found in the last year.       Lab Results    Component Value Date    HGBA1C 5.9 (H) 01/18/2024     Lab Results   Component Value Date    STAPHMRSASCR (A) 03/05/2024     Isolated: Methicillin Susceptible Staphylococcus aureus (MSSA)

## 2024-03-08 ENCOUNTER — HOSPITAL ENCOUNTER (OUTPATIENT)
Dept: CARDIOLOGY | Facility: HOSPITAL | Age: 64
Discharge: HOME | End: 2024-03-08
Payer: MEDICARE

## 2024-03-08 ENCOUNTER — OFFICE VISIT (OUTPATIENT)
Dept: CARDIOLOGY | Facility: CLINIC | Age: 64
End: 2024-03-08
Payer: MEDICARE

## 2024-03-08 VITALS — DIASTOLIC BLOOD PRESSURE: 72 MMHG | BODY MASS INDEX: 29.53 KG/M2 | WEIGHT: 200 LBS | SYSTOLIC BLOOD PRESSURE: 138 MMHG

## 2024-03-08 DIAGNOSIS — R94.31 ABNORMAL EKG: ICD-10-CM

## 2024-03-08 DIAGNOSIS — R06.02 SHORTNESS OF BREATH: ICD-10-CM

## 2024-03-08 DIAGNOSIS — I42.8 NONISCHEMIC CARDIOMYOPATHY (MULTI): ICD-10-CM

## 2024-03-08 DIAGNOSIS — I42.8 NONISCHEMIC CARDIOMYOPATHY (MULTI): Primary | ICD-10-CM

## 2024-03-08 DIAGNOSIS — R00.2 PALPITATIONS: ICD-10-CM

## 2024-03-08 DIAGNOSIS — I48.92 ATRIAL FLUTTER, UNSPECIFIED TYPE (MULTI): ICD-10-CM

## 2024-03-08 DIAGNOSIS — I49.1 PAC (PREMATURE ATRIAL CONTRACTION): ICD-10-CM

## 2024-03-08 PROBLEM — I48.0 PAF (PAROXYSMAL ATRIAL FIBRILLATION) (MULTI): Status: RESOLVED | Noted: 2024-03-07 | Resolved: 2024-03-08

## 2024-03-08 LAB
AORTIC VALVE PEAK VELOCITY: 1.51 M/S
AV PEAK GRADIENT: 9.1 MMHG
AVA (PEAK VEL): 3.77 CM2
EJECTION FRACTION APICAL 4 CHAMBER: 58.4
LEFT ATRIUM VOLUME AREA LENGTH INDEX BSA: 25.1 ML/M2
LEFT VENTRICLE INTERNAL DIMENSION DIASTOLE: 5.08 CM (ref 3.5–6)
LEFT VENTRICULAR OUTFLOW TRACT DIAMETER: 2.3 CM
MITRAL VALVE E/A RATIO: 1.14
MITRAL VALVE E/E' RATIO: 6.56
RIGHT VENTRICLE FREE WALL PEAK S': 11.3 CM/S
RIGHT VENTRICLE PEAK SYSTOLIC PRESSURE: 24.5 MMHG
TRICUSPID ANNULAR PLANE SYSTOLIC EXCURSION: 1.9 CM

## 2024-03-08 PROCEDURE — 93306 TTE W/DOPPLER COMPLETE: CPT

## 2024-03-08 PROCEDURE — 3078F DIAST BP <80 MM HG: CPT | Performed by: INTERNAL MEDICINE

## 2024-03-08 PROCEDURE — 1126F AMNT PAIN NOTED NONE PRSNT: CPT | Performed by: INTERNAL MEDICINE

## 2024-03-08 PROCEDURE — 93005 ELECTROCARDIOGRAM TRACING: CPT | Performed by: INTERNAL MEDICINE

## 2024-03-08 PROCEDURE — 99214 OFFICE O/P EST MOD 30 MIN: CPT | Performed by: INTERNAL MEDICINE

## 2024-03-08 PROCEDURE — 93010 ELECTROCARDIOGRAM REPORT: CPT | Performed by: INTERNAL MEDICINE

## 2024-03-08 PROCEDURE — 1036F TOBACCO NON-USER: CPT | Performed by: INTERNAL MEDICINE

## 2024-03-08 PROCEDURE — 1159F MED LIST DOCD IN RCRD: CPT | Performed by: INTERNAL MEDICINE

## 2024-03-08 PROCEDURE — 3075F SYST BP GE 130 - 139MM HG: CPT | Performed by: INTERNAL MEDICINE

## 2024-03-08 PROCEDURE — 93306 TTE W/DOPPLER COMPLETE: CPT | Performed by: INTERNAL MEDICINE

## 2024-03-08 PROCEDURE — 1160F RVW MEDS BY RX/DR IN RCRD: CPT | Performed by: INTERNAL MEDICINE

## 2024-03-08 ASSESSMENT — PATIENT HEALTH QUESTIONNAIRE - PHQ9
1. LITTLE INTEREST OR PLEASURE IN DOING THINGS: NOT AT ALL
SUM OF ALL RESPONSES TO PHQ9 QUESTIONS 1 AND 2: 0
2. FEELING DOWN, DEPRESSED OR HOPELESS: NOT AT ALL

## 2024-03-08 ASSESSMENT — PAIN SCALES - GENERAL: PAINLEVEL: 0-NO PAIN

## 2024-03-08 NOTE — PROGRESS NOTES
"Chief Complaint   Patient presents with    Follow up for PAC/Palpitations     Hx of RFA for A-Flutter 2016  CVN 2020  Echo- 35-39% (2020)  Stress- 36% \"no ischemia\" (2020)             The patient is a 63-year-old male who is well-known to me from previous encounters.  He underwent electrophysiology study and radiofrequency ablation for typical atrial flutter in 2016 in the setting of a moderate cardiomyopathy with ejection fraction between 35 and 39% in 2020.  There was a question of atrial fibrillation but that never was documented on a 2-week long-term Holter monitor.  I have not seen the patient in over 3 years  He is scheduled for knee replacement in the coming week.  In general he feels well and is able to participate in aerobic exercise utilizing elliptical machine without any difficulty whatsoever.  He also engages in strength training without difficulty.  He has no dyspnea exertion and no chest discomfort.  I do not see any reassessment of his ejection fraction over the past 3 years.  He does take afterload reduction with losartan but is not taking a beta-blocker.  He has not had recurrent atrial fibrillation or atrial flutter         Active Ambulatory Problems     Diagnosis Date Noted    Impaired gas exchange 09/04/2023    Atrial flutter (CMS/HCC) 09/04/2023    CMC arthritis, thumb, degenerative 09/04/2023    Erectile dysfunction 09/04/2023    Fluid volume disorder 09/04/2023    Fungal infection 09/04/2023    Gastroesophageal reflux disease 09/04/2023    History of colonic polyps 09/04/2023    Hyperlipidemia 09/04/2023    Hypertension 09/04/2023    Joint pain, knee 09/04/2023    Osteoarthrosis, localized, primary, knee 09/04/2023    Pain 09/04/2023    Palpitations 09/04/2023    Primary osteoarthritis of both knees 09/04/2023    Right bundle branch block 09/04/2023    Right wrist pain 09/04/2023    Shoulder impingement 09/04/2023    Chronic pain of left knee 12/13/2023    PAF (paroxysmal atrial fibrillation) " (CMS/HCC) 03/07/2024    PAC (premature atrial contraction) 03/07/2024    Nonischemic cardiomyopathy (CMS/[facility]) 03/07/2024     Resolved Ambulatory Problems     Diagnosis Date Noted    No Resolved Ambulatory Problems     No Additional Past Medical History        Review of Systems   All other systems reviewed and are negative.       Objective     There were no vitals filed for this visit.     Vitals and nursing note reviewed.   Constitutional:       Appearance: Healthy appearance.   HENT:    Mouth/Throat:      Pharynx: Oropharynx is clear.   Pulmonary:      Effort: Pulmonary effort is normal.      Breath sounds: Normal breath sounds.   Cardiovascular:      PMI at left midclavicular line. Normal rate. Regular rhythm. Normal S1. Normal S2.       Murmurs: There is a grade 1/6 holosystolic murmur.      No gallop.  No click. No rub.   Pulses:     Intact distal pulses.   Edema:     Peripheral edema absent.   Abdominal:      General: Bowel sounds are normal.   Musculoskeletal:      Cervical back: Normal range of motion. Skin:     General: Skin is warm and dry.   Neurological:      General: No focal deficit present.      Mental Status: Alert and oriented to person, place and time.            Lab Review:   Pre-Admission Testing on 03/05/2024   Component Date Value    Ventricular Rate 03/05/2024 58     Atrial Rate 03/05/2024 58     PA Interval 03/05/2024 164     QRS Duration 03/05/2024 148     QT Interval 03/05/2024 422     QTC Calculation(Bazett) 03/05/2024 414     P Axis 03/05/2024 20     R Axis 03/05/2024 -26     T [place] 03/05/2024 12     QRS Count 03/05/2024 9     Q Onset 03/05/2024 224     P Onset 03/05/2024 142     P Offset 03/05/2024 203     T Offset 03/05/2024 435     QTC Fredericia 03/05/2024 417     Color, Urine 03/05/2024 Light-Yellow     Appearance, Urine 03/05/2024 Clear     Specific Gravity, Urine 03/05/2024 1.011     pH, Urine 03/05/2024 5.5     Protein, Urine 03/05/2024 10 (TRACE)     Glucose, Urine 03/05/2024  Normal     Blood, Urine 03/05/2024 0.03 (TRACE) (A)     Ketones, Urine 03/05/2024 NEGATIVE     Bilirubin, Urine 03/05/2024 NEGATIVE     Urobilinogen, Urine 03/05/2024 Normal     Nitrite, Urine 03/05/2024 NEGATIVE     Leukocyte Esterase, Urine 03/05/2024 NEGATIVE     WBC 03/05/2024 7.8     nRBC 03/05/2024 0.0     RBC 03/05/2024 4.97     Hemoglobin 03/05/2024 14.9     Hematocrit 03/05/2024 44.7     MCV 03/05/2024 90     MCH 03/05/2024 30.0     MCHC 03/05/2024 33.3     RDW 03/05/2024 12.3     Platelets 03/05/2024 241     Glucose 03/05/2024 115 (H)     Sodium 03/05/2024 139     Potassium 03/05/2024 4.3     Chloride 03/05/2024 103     Bicarbonate 03/05/2024 28     Urea Nitrogen 03/05/2024 14     Creatinine 03/05/2024 0.90     eGFR 03/05/2024 >90     Calcium 03/05/2024 9.6     Anion Gap 03/05/2024 8     Staph/MRSA Screen Culture 03/05/2024 Isolated: Methicillin Susceptible Staphylococcus aureus (MSSA) (A)     WBC, Urine 03/05/2024 1-5     RBC, Urine 03/05/2024 1-2     Bacteria, Urine 03/05/2024 3+ (A)     Mucus, Urine 03/05/2024 FEW     Hyaline Casts, Urine 03/05/2024 OCCASIONAL (A)    Lab on 01/18/2024   Component Date Value    WBC 01/18/2024 8.3     nRBC 01/18/2024 0.0     RBC 01/18/2024 5.20     Hemoglobin 01/18/2024 15.6     Hematocrit 01/18/2024 47.7     MCV 01/18/2024 92     MCH 01/18/2024 30.0     MCHC 01/18/2024 32.7     RDW 01/18/2024 12.7     Platelets 01/18/2024 266     Neutrophils % 01/18/2024 60.9     Immature Granulocytes %,* 01/18/2024 0.6     Lymphocytes % 01/18/2024 23.8     Monocytes % 01/18/2024 11.0     Eosinophils % 01/18/2024 2.9     Basophils % 01/18/2024 0.8     Neutrophils Absolute 01/18/2024 5.02     Immature Granulocytes Ab* 01/18/2024 0.05     Lymphocytes Absolute 01/18/2024 1.96     Monocytes Absolute 01/18/2024 0.91     Eosinophils Absolute 01/18/2024 0.24     Basophils Absolute 01/18/2024 0.07     Glucose 01/18/2024 105 (H)     Sodium 01/18/2024 138     Potassium 01/18/2024 4.3     Chloride  01/18/2024 101     Bicarbonate 01/18/2024 26     Urea Nitrogen 01/18/2024 13     Creatinine 01/18/2024 0.90     eGFR 01/18/2024 >90     Calcium 01/18/2024 10.3     Albumin 01/18/2024 4.2     Alkaline Phosphatase 01/18/2024 58     Total Protein 01/18/2024 6.7     AST 01/18/2024 30     Bilirubin, Total 01/18/2024 0.6     ALT 01/18/2024 36     Anion Gap 01/18/2024 11     Cholesterol 01/18/2024 155     HDL-Cholesterol 01/18/2024 33.0 (L)     Cholesterol/HDL Ratio 01/18/2024 4.7     LDL Calculated 01/18/2024 91     Triglycerides 01/18/2024 156 (H)     Hemoglobin A1C 01/18/2024 5.9 (H)     Estimated Average Glucose 01/18/2024 123     Thyroid Stimulating Horm* 01/18/2024 3.23     Vitamin D, 25-Hydroxy, T* 01/18/2024 21 (L)     Prostate Specific AG 01/18/2024 1.50     Testosterone 01/18/2024 530        ECG:  Normal sinus rhythm at 61 bpm [place] interval 170 ms QRS duration 150 ms with a right bundle branch block QTc 440 ms    Assessment/plan:  There should be no cardiac contraindications to planned and necessary procedures.  However I would like to obtain an echocardiogram prior to his surgery and add a beta-blocker to his regimen if necessary.  His functional status is quite good from the cardiovascular perspective.  He has no recurrent arrhythmia history    Problem List Items Addressed This Visit          Cardiac and Vasculature    Atrial flutter (CMS/HCC)    Palpitations    PAC (premature atrial contraction)    Nonischemic cardiomyopathy (CMS/HCC)

## 2024-03-08 NOTE — ASSESSMENT & PLAN NOTE
There should be no cardiac contraindications to planned and necessary procedures.  However I would like to obtain an echocardiogram prior to his surgery and add a beta-blocker to his regimen if necessary.  His functional status is quite good from the cardiovascular perspective.  He has no recurrent arrhythmia history

## 2024-03-11 LAB
ATRIAL RATE: 58 BPM
P AXIS: 20 DEGREES
P OFFSET: 203 MS
P ONSET: 142 MS
PR INTERVAL: 164 MS
Q ONSET: 224 MS
QRS COUNT: 9 BEATS
QRS DURATION: 148 MS
QT INTERVAL: 422 MS
QTC CALCULATION(BAZETT): 414 MS
QTC FREDERICIA: 417 MS
R AXIS: -26 DEGREES
T AXIS: 12 DEGREES
T OFFSET: 435 MS
VENTRICULAR RATE: 58 BPM

## 2024-03-12 ENCOUNTER — HOME HEALTH ADMISSION (OUTPATIENT)
Dept: HOME HEALTH SERVICES | Facility: HOME HEALTH | Age: 64
End: 2024-03-12
Payer: MEDICARE

## 2024-03-12 DIAGNOSIS — K21.9 GASTRO-ESOPHAGEAL REFLUX DISEASE WITHOUT ESOPHAGITIS: ICD-10-CM

## 2024-03-12 DIAGNOSIS — M25.561 ARTHRALGIA OF RIGHT KNEE: ICD-10-CM

## 2024-03-13 RX ORDER — OMEPRAZOLE 20 MG/1
20 CAPSULE, DELAYED RELEASE ORAL EVERY OTHER DAY
Qty: 45 CAPSULE | Refills: 3 | Status: SHIPPED | OUTPATIENT
Start: 2024-03-13

## 2024-03-13 RX ORDER — MELOXICAM 15 MG/1
15 TABLET ORAL DAILY PRN
Qty: 30 TABLET | Refills: 1 | Status: SHIPPED | OUTPATIENT
Start: 2024-03-13

## 2024-03-14 ENCOUNTER — ANESTHESIA (OUTPATIENT)
Dept: OPERATING ROOM | Facility: HOSPITAL | Age: 64
End: 2024-03-14
Payer: MEDICARE

## 2024-03-14 ENCOUNTER — APPOINTMENT (OUTPATIENT)
Dept: RADIOLOGY | Facility: HOSPITAL | Age: 64
End: 2024-03-14
Payer: MEDICARE

## 2024-03-14 ENCOUNTER — HOSPITAL ENCOUNTER (OUTPATIENT)
Facility: HOSPITAL | Age: 64
Discharge: HOME | End: 2024-03-15
Attending: ORTHOPAEDIC SURGERY | Admitting: ORTHOPAEDIC SURGERY
Payer: MEDICARE

## 2024-03-14 ENCOUNTER — ANESTHESIA EVENT (OUTPATIENT)
Dept: OPERATING ROOM | Facility: HOSPITAL | Age: 64
End: 2024-03-14
Payer: MEDICARE

## 2024-03-14 ENCOUNTER — PHARMACY VISIT (OUTPATIENT)
Dept: PHARMACY | Facility: CLINIC | Age: 64
End: 2024-03-14
Payer: COMMERCIAL

## 2024-03-14 ENCOUNTER — DOCUMENTATION (OUTPATIENT)
Dept: HOME HEALTH SERVICES | Facility: HOME HEALTH | Age: 64
End: 2024-03-14

## 2024-03-14 DIAGNOSIS — M17.11 PRIMARY LOCALIZED OSTEOARTHROSIS OF RIGHT LOWER LEG: ICD-10-CM

## 2024-03-14 DIAGNOSIS — M25.562 CHRONIC PAIN OF LEFT KNEE: Primary | ICD-10-CM

## 2024-03-14 DIAGNOSIS — G89.29 CHRONIC PAIN OF LEFT KNEE: Primary | ICD-10-CM

## 2024-03-14 PROBLEM — M17.12 ARTHRITIS OF LEFT KNEE: Status: ACTIVE | Noted: 2024-03-14

## 2024-03-14 PROCEDURE — A4216 STERILE WATER/SALINE, 10 ML: HCPCS | Performed by: ORTHOPAEDIC SURGERY

## 2024-03-14 PROCEDURE — 7100000011 HC EXTENDED STAY RECOVERY HOURLY - NURSING UNIT

## 2024-03-14 PROCEDURE — 2500000001 HC RX 250 WO HCPCS SELF ADMINISTERED DRUGS (ALT 637 FOR MEDICARE OP): Performed by: ORTHOPAEDIC SURGERY

## 2024-03-14 PROCEDURE — 64447 NJX AA&/STRD FEMORAL NRV IMG: CPT | Performed by: ANESTHESIOLOGY

## 2024-03-14 PROCEDURE — 2500000004 HC RX 250 GENERAL PHARMACY W/ HCPCS (ALT 636 FOR OP/ED): Performed by: NURSE ANESTHETIST, CERTIFIED REGISTERED

## 2024-03-14 PROCEDURE — A4649 SURGICAL SUPPLIES: HCPCS | Performed by: ORTHOPAEDIC SURGERY

## 2024-03-14 PROCEDURE — RXMED WILLOW AMBULATORY MEDICATION CHARGE

## 2024-03-14 PROCEDURE — A27447 PR TOTAL KNEE ARTHROPLASTY: Performed by: ANESTHESIOLOGY

## 2024-03-14 PROCEDURE — 2500000004 HC RX 250 GENERAL PHARMACY W/ HCPCS (ALT 636 FOR OP/ED): Performed by: ANESTHESIOLOGY

## 2024-03-14 PROCEDURE — 88311 DECALCIFY TISSUE: CPT | Performed by: PATHOLOGY

## 2024-03-14 PROCEDURE — 2500000002 HC RX 250 W HCPCS SELF ADMINISTERED DRUGS (ALT 637 FOR MEDICARE OP, ALT 636 FOR OP/ED): Performed by: ORTHOPAEDIC SURGERY

## 2024-03-14 PROCEDURE — 3600000017 HC OR TIME - EACH INCREMENTAL 1 MINUTE - PROCEDURE LEVEL SIX: Performed by: ORTHOPAEDIC SURGERY

## 2024-03-14 PROCEDURE — 27447 TOTAL KNEE ARTHROPLASTY: CPT | Performed by: ORTHOPAEDIC SURGERY

## 2024-03-14 PROCEDURE — 97161 PT EVAL LOW COMPLEX 20 MIN: CPT | Mod: GP

## 2024-03-14 PROCEDURE — 73560 X-RAY EXAM OF KNEE 1 OR 2: CPT | Mod: LT

## 2024-03-14 PROCEDURE — 2720000007 HC OR 272 NO HCPCS: Performed by: ORTHOPAEDIC SURGERY

## 2024-03-14 PROCEDURE — 7100000001 HC RECOVERY ROOM TIME - INITIAL BASE CHARGE: Performed by: ORTHOPAEDIC SURGERY

## 2024-03-14 PROCEDURE — 2500000005 HC RX 250 GENERAL PHARMACY W/O HCPCS: Performed by: NURSE ANESTHETIST, CERTIFIED REGISTERED

## 2024-03-14 PROCEDURE — 97530 THERAPEUTIC ACTIVITIES: CPT | Mod: GP

## 2024-03-14 PROCEDURE — 2500000004 HC RX 250 GENERAL PHARMACY W/ HCPCS (ALT 636 FOR OP/ED): Performed by: ORTHOPAEDIC SURGERY

## 2024-03-14 PROCEDURE — 3700000002 HC GENERAL ANESTHESIA TIME - EACH INCREMENTAL 1 MINUTE: Performed by: ORTHOPAEDIC SURGERY

## 2024-03-14 PROCEDURE — 7100000002 HC RECOVERY ROOM TIME - EACH INCREMENTAL 1 MINUTE: Performed by: ORTHOPAEDIC SURGERY

## 2024-03-14 PROCEDURE — A6213 FOAM DRG >16<=48 SQ IN W/BDR: HCPCS | Performed by: ORTHOPAEDIC SURGERY

## 2024-03-14 PROCEDURE — C1713 ANCHOR/SCREW BN/BN,TIS/BN: HCPCS | Performed by: ORTHOPAEDIC SURGERY

## 2024-03-14 PROCEDURE — 3600000018 HC OR TIME - INITIAL BASE CHARGE - PROCEDURE LEVEL SIX: Performed by: ORTHOPAEDIC SURGERY

## 2024-03-14 PROCEDURE — 88304 TISSUE EXAM BY PATHOLOGIST: CPT | Performed by: PATHOLOGY

## 2024-03-14 PROCEDURE — A27447 PR TOTAL KNEE ARTHROPLASTY: Performed by: NURSE ANESTHETIST, CERTIFIED REGISTERED

## 2024-03-14 PROCEDURE — 2500000005 HC RX 250 GENERAL PHARMACY W/O HCPCS: Performed by: ANESTHESIOLOGY

## 2024-03-14 PROCEDURE — C1776 JOINT DEVICE (IMPLANTABLE): HCPCS | Performed by: ORTHOPAEDIC SURGERY

## 2024-03-14 PROCEDURE — 3700000001 HC GENERAL ANESTHESIA TIME - INITIAL BASE CHARGE: Performed by: ORTHOPAEDIC SURGERY

## 2024-03-14 PROCEDURE — 64450 NJX AA&/STRD OTHER PN/BRANCH: CPT | Performed by: ANESTHESIOLOGY

## 2024-03-14 PROCEDURE — 7100000010 HC PHASE TWO TIME - EACH INCREMENTAL 1 MINUTE: Performed by: ORTHOPAEDIC SURGERY

## 2024-03-14 PROCEDURE — 88304 TISSUE EXAM BY PATHOLOGIST: CPT | Mod: TC | Performed by: ORTHOPAEDIC SURGERY

## 2024-03-14 PROCEDURE — 7100000009 HC PHASE TWO TIME - INITIAL BASE CHARGE: Performed by: ORTHOPAEDIC SURGERY

## 2024-03-14 PROCEDURE — 2780000003 HC OR 278 NO HCPCS: Performed by: ORTHOPAEDIC SURGERY

## 2024-03-14 DEVICE — IMPLANTABLE DEVICE: Type: IMPLANTABLE DEVICE | Site: KNEE | Status: FUNCTIONAL

## 2024-03-14 DEVICE — FULL DOSE BONE CEMENT, 10 PACK CATALOG NUMBER IS 6191-1-010
Type: IMPLANTABLE DEVICE | Site: KNEE | Status: FUNCTIONAL
Brand: SIMPLEX

## 2024-03-14 DEVICE — DOME, PATELLA, MEDIALIZED, 41MM: Type: IMPLANTABLE DEVICE | Site: KNEE | Status: FUNCTIONAL

## 2024-03-14 RX ORDER — ATORVASTATIN CALCIUM 10 MG/1
10 TABLET, FILM COATED ORAL DAILY
Status: DISCONTINUED | OUTPATIENT
Start: 2024-03-14 | End: 2024-03-15 | Stop reason: HOSPADM

## 2024-03-14 RX ORDER — PROPOFOL 10 MG/ML
INJECTION, EMULSION INTRAVENOUS CONTINUOUS PRN
Status: DISCONTINUED | OUTPATIENT
Start: 2024-03-14 | End: 2024-03-14

## 2024-03-14 RX ORDER — FENTANYL CITRATE 50 UG/ML
100 INJECTION, SOLUTION INTRAMUSCULAR; INTRAVENOUS ONCE
Status: COMPLETED | OUTPATIENT
Start: 2024-03-14 | End: 2024-03-14

## 2024-03-14 RX ORDER — HYDRALAZINE HYDROCHLORIDE 20 MG/ML
5 INJECTION INTRAMUSCULAR; INTRAVENOUS EVERY 30 MIN PRN
Status: DISCONTINUED | OUTPATIENT
Start: 2024-03-14 | End: 2024-03-14 | Stop reason: HOSPADM

## 2024-03-14 RX ORDER — OXYCODONE AND ACETAMINOPHEN 5; 325 MG/1; MG/1
0.5 TABLET ORAL EVERY 4 HOURS PRN
Status: DISCONTINUED | OUTPATIENT
Start: 2024-03-14 | End: 2024-03-14

## 2024-03-14 RX ORDER — SODIUM CHLORIDE, SODIUM LACTATE, POTASSIUM CHLORIDE, CALCIUM CHLORIDE 600; 310; 30; 20 MG/100ML; MG/100ML; MG/100ML; MG/100ML
100 INJECTION, SOLUTION INTRAVENOUS CONTINUOUS
Status: DISCONTINUED | OUTPATIENT
Start: 2024-03-14 | End: 2024-03-15 | Stop reason: HOSPADM

## 2024-03-14 RX ORDER — CEFAZOLIN SODIUM 2 G/100ML
2 INJECTION, SOLUTION INTRAVENOUS EVERY 8 HOURS
Status: COMPLETED | OUTPATIENT
Start: 2024-03-14 | End: 2024-03-15

## 2024-03-14 RX ORDER — DEXAMETHASONE SODIUM PHOSPHATE 10 MG/ML
INJECTION INTRAMUSCULAR; INTRAVENOUS AS NEEDED
Status: DISCONTINUED | OUTPATIENT
Start: 2024-03-14 | End: 2024-03-14

## 2024-03-14 RX ORDER — ROPIVACAINE HYDROCHLORIDE 5 MG/ML
INJECTION, SOLUTION EPIDURAL; INFILTRATION; PERINEURAL AS NEEDED
Status: DISCONTINUED | OUTPATIENT
Start: 2024-03-14 | End: 2024-03-14 | Stop reason: HOSPADM

## 2024-03-14 RX ORDER — ASPIRIN 81 MG/1
81 TABLET ORAL 2 TIMES DAILY
Status: DISCONTINUED | OUTPATIENT
Start: 2024-03-15 | End: 2024-03-15 | Stop reason: HOSPADM

## 2024-03-14 RX ORDER — CEFAZOLIN SODIUM 2 G/100ML
2 INJECTION, SOLUTION INTRAVENOUS ONCE
Status: COMPLETED | OUTPATIENT
Start: 2024-03-14 | End: 2024-03-14

## 2024-03-14 RX ORDER — KETOROLAC TROMETHAMINE 30 MG/ML
INJECTION, SOLUTION INTRAMUSCULAR; INTRAVENOUS AS NEEDED
Status: DISCONTINUED | OUTPATIENT
Start: 2024-03-14 | End: 2024-03-14 | Stop reason: HOSPADM

## 2024-03-14 RX ORDER — OXYCODONE AND ACETAMINOPHEN 5; 325 MG/1; MG/1
1 TABLET ORAL EVERY 4 HOURS PRN
Status: DISCONTINUED | OUTPATIENT
Start: 2024-03-14 | End: 2024-03-15 | Stop reason: HOSPADM

## 2024-03-14 RX ORDER — CELECOXIB 200 MG/1
400 CAPSULE ORAL ONCE
Status: COMPLETED | OUTPATIENT
Start: 2024-03-14 | End: 2024-03-14

## 2024-03-14 RX ORDER — BUPIVACAINE HYDROCHLORIDE 5 MG/ML
INJECTION, SOLUTION PERINEURAL AS NEEDED
Status: DISCONTINUED | OUTPATIENT
Start: 2024-03-14 | End: 2024-03-14

## 2024-03-14 RX ORDER — PROPOFOL 10 MG/ML
INJECTION, EMULSION INTRAVENOUS AS NEEDED
Status: DISCONTINUED | OUTPATIENT
Start: 2024-03-14 | End: 2024-03-14

## 2024-03-14 RX ORDER — OXYCODONE HYDROCHLORIDE 5 MG/1
2.5 TABLET ORAL EVERY 4 HOURS PRN
Status: DISCONTINUED | OUTPATIENT
Start: 2024-03-14 | End: 2024-03-15 | Stop reason: HOSPADM

## 2024-03-14 RX ORDER — CYCLOBENZAPRINE HCL 10 MG
10 TABLET ORAL ONCE AS NEEDED
Status: DISCONTINUED | OUTPATIENT
Start: 2024-03-14 | End: 2024-03-14

## 2024-03-14 RX ORDER — DIPHENHYDRAMINE HCL 25 MG
25 TABLET ORAL EVERY 6 HOURS PRN
Status: DISCONTINUED | OUTPATIENT
Start: 2024-03-14 | End: 2024-03-15 | Stop reason: HOSPADM

## 2024-03-14 RX ORDER — ACETAMINOPHEN 325 MG/1
650 TABLET ORAL EVERY 6 HOURS SCHEDULED
Status: DISCONTINUED | OUTPATIENT
Start: 2024-03-14 | End: 2024-03-15 | Stop reason: HOSPADM

## 2024-03-14 RX ORDER — ALUMINUM HYDROXIDE, MAGNESIUM HYDROXIDE, AND SIMETHICONE 2400; 240; 2400 MG/30ML; MG/30ML; MG/30ML
5 SUSPENSION ORAL ONCE AS NEEDED
Status: DISCONTINUED | OUTPATIENT
Start: 2024-03-14 | End: 2024-03-15 | Stop reason: HOSPADM

## 2024-03-14 RX ORDER — ACETAMINOPHEN 325 MG/1
975 TABLET ORAL ONCE
Status: COMPLETED | OUTPATIENT
Start: 2024-03-14 | End: 2024-03-14

## 2024-03-14 RX ORDER — OXYCODONE AND ACETAMINOPHEN 10; 325 MG/1; MG/1
1 TABLET ORAL EVERY 4 HOURS PRN
Status: DISCONTINUED | OUTPATIENT
Start: 2024-03-14 | End: 2024-03-14

## 2024-03-14 RX ORDER — MORPHINE SULFATE 10 MG/ML
INJECTION, SOLUTION INTRAMUSCULAR; INTRAVENOUS AS NEEDED
Status: DISCONTINUED | OUTPATIENT
Start: 2024-03-14 | End: 2024-03-14 | Stop reason: HOSPADM

## 2024-03-14 RX ORDER — ACETAMINOPHEN 325 MG/1
650 TABLET ORAL EVERY 6 HOURS SCHEDULED
Status: DISCONTINUED | OUTPATIENT
Start: 2024-03-14 | End: 2024-03-14

## 2024-03-14 RX ORDER — ONDANSETRON HYDROCHLORIDE 2 MG/ML
4 INJECTION, SOLUTION INTRAVENOUS EVERY 8 HOURS PRN
Status: DISCONTINUED | OUTPATIENT
Start: 2024-03-14 | End: 2024-03-15 | Stop reason: HOSPADM

## 2024-03-14 RX ORDER — NALOXONE HYDROCHLORIDE 0.4 MG/ML
0.2 INJECTION, SOLUTION INTRAMUSCULAR; INTRAVENOUS; SUBCUTANEOUS EVERY 5 MIN PRN
Status: DISCONTINUED | OUTPATIENT
Start: 2024-03-14 | End: 2024-03-14

## 2024-03-14 RX ORDER — SODIUM CHLORIDE 9 MG/ML
100 INJECTION, SOLUTION INTRAVENOUS CONTINUOUS
Status: DISCONTINUED | OUTPATIENT
Start: 2024-03-14 | End: 2024-03-15 | Stop reason: HOSPADM

## 2024-03-14 RX ORDER — OXYCODONE AND ACETAMINOPHEN 5; 325 MG/1; MG/1
1 TABLET ORAL EVERY 4 HOURS PRN
Qty: 36 TABLET | Refills: 0 | Status: SHIPPED | OUTPATIENT
Start: 2024-03-14 | End: 2024-03-27 | Stop reason: SDUPTHER

## 2024-03-14 RX ORDER — OXYCODONE AND ACETAMINOPHEN 10; 325 MG/1; MG/1
1 TABLET ORAL EVERY 4 HOURS PRN
Status: DISCONTINUED | OUTPATIENT
Start: 2024-03-14 | End: 2024-03-15 | Stop reason: HOSPADM

## 2024-03-14 RX ORDER — NAPROXEN 500 MG/1
500 TABLET ORAL 2 TIMES DAILY
Qty: 28 TABLET | Refills: 0 | Status: SHIPPED | OUTPATIENT
Start: 2024-03-14

## 2024-03-14 RX ORDER — TRANEXAMIC ACID 100 MG/ML
INJECTION, SOLUTION INTRAVENOUS AS NEEDED
Status: DISCONTINUED | OUTPATIENT
Start: 2024-03-14 | End: 2024-03-14

## 2024-03-14 RX ORDER — CYCLOBENZAPRINE HCL 10 MG
10 TABLET ORAL 3 TIMES DAILY PRN
Status: DISCONTINUED | OUTPATIENT
Start: 2024-03-14 | End: 2024-03-15 | Stop reason: HOSPADM

## 2024-03-14 RX ORDER — DOCUSATE SODIUM 100 MG/1
100 CAPSULE, LIQUID FILLED ORAL 2 TIMES DAILY PRN
Qty: 30 CAPSULE | Refills: 0 | Status: SHIPPED | OUTPATIENT
Start: 2024-03-14

## 2024-03-14 RX ORDER — SODIUM CHLORIDE, SODIUM LACTATE, POTASSIUM CHLORIDE, CALCIUM CHLORIDE 600; 310; 30; 20 MG/100ML; MG/100ML; MG/100ML; MG/100ML
100 INJECTION, SOLUTION INTRAVENOUS CONTINUOUS
Status: DISCONTINUED | OUTPATIENT
Start: 2024-03-14 | End: 2024-03-14 | Stop reason: HOSPADM

## 2024-03-14 RX ORDER — MIDAZOLAM HYDROCHLORIDE 1 MG/ML
INJECTION, SOLUTION INTRAMUSCULAR; INTRAVENOUS AS NEEDED
Status: DISCONTINUED | OUTPATIENT
Start: 2024-03-14 | End: 2024-03-14

## 2024-03-14 RX ORDER — LABETALOL HYDROCHLORIDE 5 MG/ML
5 INJECTION, SOLUTION INTRAVENOUS ONCE AS NEEDED
Status: DISCONTINUED | OUTPATIENT
Start: 2024-03-14 | End: 2024-03-14 | Stop reason: HOSPADM

## 2024-03-14 RX ORDER — FENTANYL CITRATE 50 UG/ML
INJECTION, SOLUTION INTRAMUSCULAR; INTRAVENOUS AS NEEDED
Status: DISCONTINUED | OUTPATIENT
Start: 2024-03-14 | End: 2024-03-14

## 2024-03-14 RX ORDER — PANTOPRAZOLE SODIUM 40 MG/1
40 TABLET, DELAYED RELEASE ORAL
Status: DISCONTINUED | OUTPATIENT
Start: 2024-03-15 | End: 2024-03-15 | Stop reason: HOSPADM

## 2024-03-14 RX ORDER — CEFADROXIL 500 MG/1
500 CAPSULE ORAL 2 TIMES DAILY
Qty: 14 CAPSULE | Refills: 0 | Status: SHIPPED | OUTPATIENT
Start: 2024-03-14 | End: 2024-05-31 | Stop reason: SDUPTHER

## 2024-03-14 RX ORDER — ALBUTEROL SULFATE 0.83 MG/ML
2.5 SOLUTION RESPIRATORY (INHALATION) ONCE AS NEEDED
Status: DISCONTINUED | OUTPATIENT
Start: 2024-03-14 | End: 2024-03-14 | Stop reason: HOSPADM

## 2024-03-14 RX ORDER — SODIUM CHLORIDE 9 MG/ML
INJECTION, SOLUTION INTRAMUSCULAR; INTRAVENOUS; SUBCUTANEOUS AS NEEDED
Status: DISCONTINUED | OUTPATIENT
Start: 2024-03-14 | End: 2024-03-14 | Stop reason: HOSPADM

## 2024-03-14 RX ORDER — HYDROCODONE BITARTRATE AND ACETAMINOPHEN 5; 325 MG/1; MG/1
1 TABLET ORAL EVERY 4 HOURS PRN
Status: DISCONTINUED | OUTPATIENT
Start: 2024-03-14 | End: 2024-03-14

## 2024-03-14 RX ORDER — CEFAZOLIN SODIUM 2 G/100ML
2 INJECTION, SOLUTION INTRAVENOUS EVERY 8 HOURS
Status: DISCONTINUED | OUTPATIENT
Start: 2024-03-14 | End: 2024-03-14

## 2024-03-14 RX ORDER — SCOLOPAMINE TRANSDERMAL SYSTEM 1 MG/1
1 PATCH, EXTENDED RELEASE TRANSDERMAL ONCE
Status: DISCONTINUED | OUTPATIENT
Start: 2024-03-14 | End: 2024-03-15 | Stop reason: HOSPADM

## 2024-03-14 RX ORDER — EPINEPHRINE 1 MG/ML
INJECTION INTRAMUSCULAR; INTRAVENOUS; SUBCUTANEOUS AS NEEDED
Status: DISCONTINUED | OUTPATIENT
Start: 2024-03-14 | End: 2024-03-14 | Stop reason: HOSPADM

## 2024-03-14 RX ORDER — ONDANSETRON 4 MG/1
4 TABLET, ORALLY DISINTEGRATING ORAL EVERY 8 HOURS PRN
Status: DISCONTINUED | OUTPATIENT
Start: 2024-03-14 | End: 2024-03-15 | Stop reason: HOSPADM

## 2024-03-14 RX ORDER — MIDAZOLAM HYDROCHLORIDE 1 MG/ML
2 INJECTION, SOLUTION INTRAMUSCULAR; INTRAVENOUS ONCE
Status: COMPLETED | OUTPATIENT
Start: 2024-03-14 | End: 2024-03-14

## 2024-03-14 RX ORDER — BUPIVACAINE HYDROCHLORIDE 2.5 MG/ML
INJECTION, SOLUTION INFILTRATION; PERINEURAL AS NEEDED
Status: DISCONTINUED | OUTPATIENT
Start: 2024-03-14 | End: 2024-03-14

## 2024-03-14 RX ORDER — ASPIRIN 81 MG/1
81 TABLET ORAL 2 TIMES DAILY
Qty: 60 TABLET | Refills: 0 | Status: SHIPPED | OUTPATIENT
Start: 2024-03-14

## 2024-03-14 RX ORDER — ONDANSETRON 4 MG/1
4 TABLET, ORALLY DISINTEGRATING ORAL EVERY 8 HOURS PRN
Qty: 20 TABLET | Refills: 0 | Status: SHIPPED | OUTPATIENT
Start: 2024-03-14

## 2024-03-14 RX ORDER — NALOXONE HYDROCHLORIDE 0.4 MG/ML
0.2 INJECTION, SOLUTION INTRAMUSCULAR; INTRAVENOUS; SUBCUTANEOUS EVERY 5 MIN PRN
Status: DISCONTINUED | OUTPATIENT
Start: 2024-03-14 | End: 2024-03-15 | Stop reason: HOSPADM

## 2024-03-14 RX ORDER — MEPERIDINE HYDROCHLORIDE 25 MG/ML
12.5 INJECTION INTRAMUSCULAR; INTRAVENOUS; SUBCUTANEOUS EVERY 10 MIN PRN
Status: DISCONTINUED | OUTPATIENT
Start: 2024-03-14 | End: 2024-03-14 | Stop reason: HOSPADM

## 2024-03-14 RX ORDER — POLYETHYLENE GLYCOL 3350 17 G/17G
17 POWDER, FOR SOLUTION ORAL DAILY
Status: DISCONTINUED | OUTPATIENT
Start: 2024-03-14 | End: 2024-03-15 | Stop reason: HOSPADM

## 2024-03-14 RX ORDER — ONDANSETRON HYDROCHLORIDE 2 MG/ML
4 INJECTION, SOLUTION INTRAVENOUS ONCE AS NEEDED
Status: DISCONTINUED | OUTPATIENT
Start: 2024-03-14 | End: 2024-03-14 | Stop reason: HOSPADM

## 2024-03-14 RX ORDER — FENTANYL CITRATE 50 UG/ML
50 INJECTION, SOLUTION INTRAMUSCULAR; INTRAVENOUS EVERY 5 MIN PRN
Status: DISCONTINUED | OUTPATIENT
Start: 2024-03-14 | End: 2024-03-14 | Stop reason: HOSPADM

## 2024-03-14 RX ORDER — TAMSULOSIN HYDROCHLORIDE 0.4 MG/1
0.4 CAPSULE ORAL NIGHTLY
Status: DISCONTINUED | OUTPATIENT
Start: 2024-03-14 | End: 2024-03-15 | Stop reason: HOSPADM

## 2024-03-14 RX ADMIN — ACETAMINOPHEN 975 MG: 325 TABLET ORAL at 07:36

## 2024-03-14 RX ADMIN — MIDAZOLAM HYDROCHLORIDE 1 MG: 1 INJECTION, SOLUTION INTRAMUSCULAR; INTRAVENOUS at 09:34

## 2024-03-14 RX ADMIN — HYDROMORPHONE HYDROCHLORIDE 0.5 MG: 1 INJECTION, SOLUTION INTRAMUSCULAR; INTRAVENOUS; SUBCUTANEOUS at 12:08

## 2024-03-14 RX ADMIN — FENTANYL CITRATE 50 MCG: 0.05 INJECTION, SOLUTION INTRAMUSCULAR; INTRAVENOUS at 10:49

## 2024-03-14 RX ADMIN — SCOPALAMINE 1 PATCH: 1 PATCH, EXTENDED RELEASE TRANSDERMAL at 17:42

## 2024-03-14 RX ADMIN — BUPIVACAINE HYDROCHLORIDE 10 ML: 2.5 INJECTION, SOLUTION INFILTRATION; PERINEURAL at 08:40

## 2024-03-14 RX ADMIN — MIDAZOLAM HYDROCHLORIDE 1 MG: 1 INJECTION, SOLUTION INTRAMUSCULAR; INTRAVENOUS at 09:04

## 2024-03-14 RX ADMIN — OXYCODONE AND ACETAMINOPHEN 1 TABLET: 10; 325 TABLET ORAL at 17:42

## 2024-03-14 RX ADMIN — FENTANYL CITRATE 100 MCG: 50 INJECTION INTRAMUSCULAR; INTRAVENOUS at 08:30

## 2024-03-14 RX ADMIN — TRANEXAMIC ACID 1000 MG: 100 INJECTION, SOLUTION INTRAVENOUS at 10:36

## 2024-03-14 RX ADMIN — DEXAMETHASONE SODIUM PHOSPHATE 10 MG: 10 INJECTION, SOLUTION INTRAMUSCULAR; INTRAVENOUS at 08:37

## 2024-03-14 RX ADMIN — CEFAZOLIN SODIUM 2 G: 2 INJECTION, SOLUTION INTRAVENOUS at 08:55

## 2024-03-14 RX ADMIN — ATORVASTATIN CALCIUM 10 MG: 10 TABLET, FILM COATED ORAL at 17:42

## 2024-03-14 RX ADMIN — CELECOXIB 400 MG: 200 CAPSULE ORAL at 07:36

## 2024-03-14 RX ADMIN — FENTANYL CITRATE 50 MCG: 0.05 INJECTION, SOLUTION INTRAMUSCULAR; INTRAVENOUS at 09:34

## 2024-03-14 RX ADMIN — PROPOFOL 50 MCG/KG/MIN: 10 INJECTION, EMULSION INTRAVENOUS at 09:11

## 2024-03-14 RX ADMIN — CEFAZOLIN SODIUM 2 G: 2 INJECTION, SOLUTION INTRAVENOUS at 17:55

## 2024-03-14 RX ADMIN — MIDAZOLAM 2 MG: 1 INJECTION INTRAMUSCULAR; INTRAVENOUS at 08:30

## 2024-03-14 RX ADMIN — OXYCODONE AND ACETAMINOPHEN 1 TABLET: 10; 325 TABLET ORAL at 21:41

## 2024-03-14 RX ADMIN — SODIUM CHLORIDE, SODIUM LACTATE, POTASSIUM CHLORIDE, AND CALCIUM CHLORIDE 100 ML/HR: 600; 310; 30; 20 INJECTION, SOLUTION INTRAVENOUS at 17:42

## 2024-03-14 RX ADMIN — PROPOFOL 30 MG: 10 INJECTION, EMULSION INTRAVENOUS at 09:34

## 2024-03-14 RX ADMIN — PROPOFOL 30 MG: 10 INJECTION, EMULSION INTRAVENOUS at 09:11

## 2024-03-14 RX ADMIN — CYCLOBENZAPRINE 10 MG: 10 TABLET, FILM COATED ORAL at 21:03

## 2024-03-14 RX ADMIN — SODIUM CHLORIDE, SODIUM LACTATE, POTASSIUM CHLORIDE, AND CALCIUM CHLORIDE: 600; 310; 30; 20 INJECTION, SOLUTION INTRAVENOUS at 08:51

## 2024-03-14 RX ADMIN — BUPIVACAINE HYDROCHLORIDE 25 ML: 5 INJECTION, SOLUTION PERINEURAL at 08:37

## 2024-03-14 RX ADMIN — TAMSULOSIN HYDROCHLORIDE 0.4 MG: 0.4 CAPSULE ORAL at 21:04

## 2024-03-14 RX ADMIN — TRANEXAMIC ACID 1000 MG: 100 INJECTION, SOLUTION INTRAVENOUS at 09:12

## 2024-03-14 RX ADMIN — HYDROMORPHONE HYDROCHLORIDE 0.5 MG: 1 INJECTION, SOLUTION INTRAMUSCULAR; INTRAVENOUS; SUBCUTANEOUS at 12:22

## 2024-03-14 SDOH — SOCIAL STABILITY: SOCIAL INSECURITY: HAS ANYONE EVER THREATENED TO HURT YOUR FAMILY OR YOUR PETS?: NO

## 2024-03-14 SDOH — SOCIAL STABILITY: SOCIAL INSECURITY: ABUSE: ADULT

## 2024-03-14 SDOH — SOCIAL STABILITY: SOCIAL INSECURITY: ARE YOU OR HAVE YOU BEEN THREATENED OR ABUSED PHYSICALLY, EMOTIONALLY, OR SEXUALLY BY ANYONE?: NO

## 2024-03-14 SDOH — SOCIAL STABILITY: SOCIAL INSECURITY: HAVE YOU HAD THOUGHTS OF HARMING ANYONE ELSE?: NO

## 2024-03-14 SDOH — SOCIAL STABILITY: SOCIAL INSECURITY: WERE YOU ABLE TO COMPLETE ALL THE BEHAVIORAL HEALTH SCREENINGS?: YES

## 2024-03-14 SDOH — SOCIAL STABILITY: SOCIAL INSECURITY: ARE THERE ANY APPARENT SIGNS OF INJURIES/BEHAVIORS THAT COULD BE RELATED TO ABUSE/NEGLECT?: NO

## 2024-03-14 SDOH — SOCIAL STABILITY: SOCIAL INSECURITY: DO YOU FEEL UNSAFE GOING BACK TO THE PLACE WHERE YOU ARE LIVING?: NO

## 2024-03-14 SDOH — SOCIAL STABILITY: SOCIAL INSECURITY: DO YOU FEEL ANYONE HAS EXPLOITED OR TAKEN ADVANTAGE OF YOU FINANCIALLY OR OF YOUR PERSONAL PROPERTY?: NO

## 2024-03-14 SDOH — HEALTH STABILITY: MENTAL HEALTH: CURRENT SMOKER: 0

## 2024-03-14 SDOH — SOCIAL STABILITY: SOCIAL INSECURITY: DOES ANYONE TRY TO KEEP YOU FROM HAVING/CONTACTING OTHER FRIENDS OR DOING THINGS OUTSIDE YOUR HOME?: NO

## 2024-03-14 ASSESSMENT — ENCOUNTER SYMPTOMS
ENDOCRINE NEGATIVE: 1
NEUROLOGICAL NEGATIVE: 1
CARDIOVASCULAR NEGATIVE: 1
GASTROINTESTINAL NEGATIVE: 1
CONSTITUTIONAL NEGATIVE: 1
EYES NEGATIVE: 1
PSYCHIATRIC NEGATIVE: 1
RESPIRATORY NEGATIVE: 1

## 2024-03-14 ASSESSMENT — PAIN - FUNCTIONAL ASSESSMENT
PAIN_FUNCTIONAL_ASSESSMENT: 0-10
PAIN_FUNCTIONAL_ASSESSMENT: FLACC (FACE, LEGS, ACTIVITY, CRY, CONSOLABILITY)
PAIN_FUNCTIONAL_ASSESSMENT: 0-10
PAIN_FUNCTIONAL_ASSESSMENT: FLACC (FACE, LEGS, ACTIVITY, CRY, CONSOLABILITY)
PAIN_FUNCTIONAL_ASSESSMENT: 0-10

## 2024-03-14 ASSESSMENT — COGNITIVE AND FUNCTIONAL STATUS - GENERAL
STANDING UP FROM CHAIR USING ARMS: A LITTLE
CLIMB 3 TO 5 STEPS WITH RAILING: A LOT
MOVING TO AND FROM BED TO CHAIR: A LITTLE
CLIMB 3 TO 5 STEPS WITH RAILING: A LOT
MOVING FROM LYING ON BACK TO SITTING ON SIDE OF FLAT BED WITH BEDRAILS: A LITTLE
MOVING TO AND FROM BED TO CHAIR: A LITTLE
TURNING FROM BACK TO SIDE WHILE IN FLAT BAD: A LITTLE
MOBILITY SCORE: 19
DAILY ACTIVITIY SCORE: 23
WALKING IN HOSPITAL ROOM: A LITTLE
DRESSING REGULAR LOWER BODY CLOTHING: A LITTLE
STANDING UP FROM CHAIR USING ARMS: A LITTLE
PATIENT BASELINE BEDBOUND: NO
STANDING UP FROM CHAIR USING ARMS: A LITTLE
CLIMB 3 TO 5 STEPS WITH RAILING: A LITTLE
MOBILITY SCORE: 18
WALKING IN HOSPITAL ROOM: A LITTLE
MOBILITY SCORE: 19
WALKING IN HOSPITAL ROOM: A LITTLE
MOVING TO AND FROM BED TO CHAIR: A LITTLE

## 2024-03-14 ASSESSMENT — ACTIVITIES OF DAILY LIVING (ADL)
FEEDING YOURSELF: INDEPENDENT
DRESSING YOURSELF: INDEPENDENT
HEARING - RIGHT EAR: FUNCTIONAL
JUDGMENT_ADEQUATE_SAFELY_COMPLETE_DAILY_ACTIVITIES: YES
LACK_OF_TRANSPORTATION: NO
WALKS IN HOME: INDEPENDENT
ADL_ASSISTANCE: INDEPENDENT
BATHING: INDEPENDENT
TOILETING: INDEPENDENT
PATIENT'S MEMORY ADEQUATE TO SAFELY COMPLETE DAILY ACTIVITIES?: YES
ADEQUATE_TO_COMPLETE_ADL: YES
HEARING - LEFT EAR: FUNCTIONAL
GROOMING: INDEPENDENT

## 2024-03-14 ASSESSMENT — PAIN DESCRIPTION - LOCATION
LOCATION: KNEE

## 2024-03-14 ASSESSMENT — PAIN SCALES - GENERAL
PAINLEVEL_OUTOF10: 0 - NO PAIN
PAINLEVEL_OUTOF10: 2
PAINLEVEL_OUTOF10: 2
PAINLEVEL_OUTOF10: 5 - MODERATE PAIN
PAINLEVEL_OUTOF10: 2
PAINLEVEL_OUTOF10: 0 - NO PAIN
PAINLEVEL_OUTOF10: 7
PAIN_LEVEL: 5
PAINLEVEL_OUTOF10: 0 - NO PAIN
PAINLEVEL_OUTOF10: 5 - MODERATE PAIN
PAINLEVEL_OUTOF10: 7
PAINLEVEL_OUTOF10: 2
PAINLEVEL_OUTOF10: 2

## 2024-03-14 ASSESSMENT — LIFESTYLE VARIABLES
SUBSTANCE_ABUSE_PAST_12_MONTHS: NO
PRESCIPTION_ABUSE_PAST_12_MONTHS: NO
SKIP TO QUESTIONS 9-10: 1
HOW MANY STANDARD DRINKS CONTAINING ALCOHOL DO YOU HAVE ON A TYPICAL DAY: PATIENT DOES NOT DRINK
AUDIT-C TOTAL SCORE: 0
HOW OFTEN DO YOU HAVE 6 OR MORE DRINKS ON ONE OCCASION: NEVER
HOW OFTEN DO YOU HAVE A DRINK CONTAINING ALCOHOL: NEVER
AUDIT-C TOTAL SCORE: 0

## 2024-03-14 ASSESSMENT — PAIN DESCRIPTION - ORIENTATION
ORIENTATION: LEFT
ORIENTATION: LEFT

## 2024-03-14 ASSESSMENT — PATIENT HEALTH QUESTIONNAIRE - PHQ9
SUM OF ALL RESPONSES TO PHQ9 QUESTIONS 1 & 2: 0
1. LITTLE INTEREST OR PLEASURE IN DOING THINGS: NOT AT ALL
2. FEELING DOWN, DEPRESSED OR HOPELESS: NOT AT ALL

## 2024-03-14 ASSESSMENT — COLUMBIA-SUICIDE SEVERITY RATING SCALE - C-SSRS
1. IN THE PAST MONTH, HAVE YOU WISHED YOU WERE DEAD OR WISHED YOU COULD GO TO SLEEP AND NOT WAKE UP?: NO
2. HAVE YOU ACTUALLY HAD ANY THOUGHTS OF KILLING YOURSELF?: NO
6. HAVE YOU EVER DONE ANYTHING, STARTED TO DO ANYTHING, OR PREPARED TO DO ANYTHING TO END YOUR LIFE?: NO

## 2024-03-14 ASSESSMENT — PAIN DESCRIPTION - DESCRIPTORS: DESCRIPTORS: ACHING;DULL

## 2024-03-14 NOTE — HH CARE COORDINATION
Home Care received a Referral for Physical Therapy. We have processed the referral for a Start of Care on 03/15.     If you have any questions or concerns, please feel free to contact us at [phone]. Follow the prompts, enter your five digit zip code, and you will be directed to your care team on [facility].

## 2024-03-14 NOTE — PROGRESS NOTES
Physical Therapy    Physical Therapy Evaluation    Patient Name: Leonel Ceballos  MRN: [id number]  Today's Date: 3/14/2024   Time Calculation  Start Time: 1403  Stop Time: 1444  Time Calculation (min): 41 min    Assessment/Plan   PT Assessment  PT Assessment Results: Decreased strength, Decreased range of motion, Decreased endurance, Impaired balance, Decreased mobility, Decreased safety awareness, Orthopedic restrictions, Impaired sensation  Rehab Prognosis: Good  Medical Staff Made Aware: Yes  Barriers to Participation: Housing layout  End of Session Communication: Bedside nurse  End of Session Patient Position: Up in chair, Alarm off, not on at start of session    Assessment Comment: 64 y/o male who presents with decreased mobility as a result of post-op weakness, decreased L knee ROM, decreased activity tolerance and impaired balance. Pt unable to complete stair navigation this date due to post op weakness and dizziness. PT reattempted multiple times after pt slept and rested; 15:20 and 16:15; pt continued to have decreased strength and sensation to lower L leg. Pt, family and MD agreeable to remain in-house overnight due to inability to safely assess stairs.       IP OR SWING BED PT PLAN  Inpatient or Swing Bed: Inpatient  PT Plan  Treatment/Interventions: Bed mobility, Transfer training, Gait training, Stair training, Balance training, Strengthening, Endurance training, Therapeutic exercise, Therapeutic activity  PT Plan: Skilled PT  PT Frequency: BID  PT Discharge Recommendations: Low intensity level of continued care  Equipment Recommended upon Discharge: Wheeled walker  PT Recommended Transfer Status: Assist x1, Assistive device  PT - OK to Discharge: Yes      Subjective   General Visit Information:  General  Reason for Referral: recent surgery; s/p L TKA  Referred By: Dr. Pino  Past Medical History Relevant to Rehab: OA, A flutter, HLD, HTN  Family/Caregiver Present: Yes  Caregiver Feedback: spouse at  "bedside  Prior to Session Communication: Bedside nurse  Patient Position Received: Bed, 2 rail up, Alarm off, not on at start of session  Preferred Learning Style: verbal  General Comment: Supine in pacu cart. Slightly lethargic. Agreeable to participate. Hep locked.  Home Living:  Home Living  Type of Home: House  Lives With: Spouse  Home Adaptive Equipment: Walker rolling or standard, Cane  Home Layout: Multi-level, 1/2 bath on main level, Bed/bath upstairs, Stairs to alternate level with rails  Alternate Level Stairs-Number of Steps: 12  Home Access: Stairs to enter without rails  Entrance Stairs-Number of Steps: 3  Bathroom Toilet: Adaptive toilet seating  Home Living Comments: Second floor bedroom and full bathroom. Half bath first floor.  Prior Level of Function:  Prior Function Per Pt/Caregiver Report  Level of [place]: Independent with ADLs and functional transfers  Receives Help From: Family  ADL Assistance: Independent  Homemaking Assistance: Independent  Ambulatory Assistance: Independent  Prior Function Comments: Independent. Amb with cane for long distance ambulation.  Precautions:  Precautions  LE Weight Bearing Status: Weight Bearing as Tolerated  Medical Precautions: Fall precautions  Post-Surgical Precautions: Left total knee precautions  Vital Signs:       Objective   Pain:  Pain Assessment  Pain Assessment: 0-10  Pain Score: 2  Pain Type: Surgical pain  Pain Location: Knee  Pain Orientation: Left  Cognition:  Cognition  Overall Cognitive Status: Within Functional Limits (slightly drowsy/lethargic. Reported feeling \"like I have a good buzz.\")  Attention:  (able to follow commands appropriately)    General Assessments:  General Observation  General Observation: LLE foot drop, weakness and decreased proprioception L foot during ambulation.     Activity Tolerance  Endurance: Decreased tolerance for upright activites    Sensation  Light Touch: Partial deficits in the LLE  Sensation Comment: " "decreased sensation to L foot/Lower leg. Reported feeling \"like my foot is asleep and I can't move it.\"    Strength  Strength Comments: L ankle DF decreased. Decreased proprioception and awareness of L LE placement.  Coordination  Coordination Comment: decreased toe tapping LLE    Postural Control  Posture Comment: rounded shoulders    Static Sitting Balance  Static Sitting-Level of Assistance: Close supervision  Dynamic Sitting Balance  Dynamic Sitting-Comments: no LOB sitting on EOB donning pants and socks; assistance required for ADL task completion.    Static Standing Balance  Static Standing-Level of Assistance: Minimum assistance  Static Standing-Comment/Number of Minutes: UE support of walker  Dynamic Standing Balance  Dynamic Standing-Comments: unsteady with ambulation primarily due to drop foot L LE.  Functional Assessments:  Bed Mobility  Bed Mobility: Yes  Bed Mobility 1  Bed Mobility 1: Supine to sitting  Level of Assistance 1: Close supervision  Bed Mobility Comments 1: HOB elevated    Transfers  Transfer: Yes  Transfer 1  Technique 1: Sit to stand  Transfer Device 1: Walker  Transfer Level of Assistance 1: Contact guard  Trials/Comments 1: x2 from elevated EOB. x 2 from chair with arms.  Transfers 2  Technique 2: Stand to sit  Transfer Device 2: Walker  Transfer Level of Assistance 2: Contact guard  Trials/Comments 2: Cueing for proper hand placement    Ambulation/Gait Training  Ambulation/Gait Training Performed: Yes  Ambulation/Gait Training 1  Surface 1: Level tile  Device 1: Rolling walker  Gait Support Devices: Wheelchair follow  Assistance 1: Minimum assistance, Moderate assistance  Quality of Gait 1:  (decreased mayela, decreased stance time LLE, observed foot drop and decreased toe clearance LLE. Decreased proprioception and placement of LLE.)  Comments/Distance (ft) 1: about 10-12 ft x 2 trials. Seated rest break in between    Stairs  Stairs: No (unsafe to attempt due to L foot drop, " Decreased L LE proprioception and reports of dizziness and lightheadedness. Pt has stairs to enter home.)  Extremity/Trunk Assessments:  RLE   RLE : Within Functional Limits  LLE   LLE : Exceptions to WFL  AROM LLE (degrees)  LLE AROM Comment: L knee about 70 degrees flex sitting on EOB  Strength LLE  LLE Overall Strength:  (L knee ext/flex > 3/5 observed.  L ankle DF, trace contraction.)    Treatment  Instructed pt on gait training with walker. Proper step-to gait pattern. Required visual feedback for proper LLE foot placement. Cueing to stay within parameter of walker. Performed L LAQs.     Outcome Measures:  [facility] Basic Mobility  Turning from your back to your side while in a flat bed without using bedrails: A little  Moving from lying on your back to sitting on the side of a flat bed without using bedrails: A little  Moving to and from bed to chair (including a wheelchair): A little  Standing up from a chair using your arms (e.g. wheelchair or bedside chair): A little  To walk in hospital room: A little  Climbing 3-5 steps with railing: A little  Basic Mobility - Total Score: 18    Encounter Problems       Encounter Problems (Active)       Balance       dynamic (Progressing)       Start:  03/14/24    Expected End:  03/21/24       No LOB with dynamic activities, UE support of walker when standing            Mobility       LTG - Patient will navigate 4-6 steps with rails/device (Progressing)       Start:  03/14/24    Expected End:  03/21/24            ambulation (Progressing)       Start:  03/14/24    Expected End:  03/21/24       Pt will amb > 200  ft with wheeled walker and mod independence          ROM (Progressing)       Start:  03/14/24    Expected End:  03/21/24       L knee flex 90 degrees            PT Transfers       sit to stand (Progressing)       Start:  03/14/24    Expected End:  03/21/24       Pt will perform sit to stand transfer with walker and mod independence.             Safety       precautions  (Progressing)       Start:  03/14/24    Expected End:  03/21/24       Pt will maintain TKA precautions with all mobility.                 Education Documentation  Precautions, taught by Bella Coleman PT at 3/14/2024  3:15 PM.  Learner: Family, Patient  Readiness: Acceptance  Method: Explanation  Response: Verbalizes Understanding    Body Mechanics, taught by Bella Coleman, PT at 3/14/2024  3:15 PM.  Learner: Family, Patient  Readiness: Acceptance  Method: Explanation  Response: Verbalizes Understanding    Home Exercise Program, taught by Bella Coleman, PT at 3/14/2024  3:15 PM.  Learner: Family, Patient  Readiness: Acceptance  Method: Explanation  Response: Verbalizes Understanding    Mobility Training, taught by Bella Coleman PT at 3/14/2024  3:15 PM.  Learner: Family, Patient  Readiness: Acceptance  Method: Explanation  Response: Verbalizes Understanding    Education Comments  No comments found.

## 2024-03-14 NOTE — ANESTHESIA POSTPROCEDURE EVALUATION
Patient: Leonel Ceballos    Procedure Summary       Date: 03/14/24 Room / Location: [facility] OR 02 / [facility]    Anesthesia Start: 0851 Anesthesia Stop: 1114    Procedure: Open Total Knee Arthroplasty (Left: Knee) Diagnosis:       Chronic pain of left knee      (Chronic pain of left knee [M25.562, G89.29])    Surgeons: Gabriele Pino MD Responsible Provider: Juan Jacome MD    Anesthesia Type: regional, spinal ASA Status: 2            Anesthesia Type: regional, spinal    Vitals Value Taken Time   /82 03/14/24 1226   Temp 36.1 °C (97 °F) 03/14/24 1109   Pulse 61 03/14/24 1229   Resp 14 03/14/24 1229   SpO2 92 % 03/14/24 1229   Vitals shown include unvalidated device data.    Anesthesia Post Evaluation    Patient location during evaluation: PACU  Patient participation: complete - patient participated  Level of consciousness: awake and alert  Pain score: 5  Pain management: satisfactory to patient  Airway patency: patent  Two or more strategies used to mitigate risk of obstructive sleep apnea  Cardiovascular status: acceptable  Respiratory status: acceptable  Hydration status: acceptable  Postoperative Nausea and Vomiting: none  Comments: Spinal regressing expectantly.        There were no known notable events for this encounter.

## 2024-03-14 NOTE — POST-PROCEDURE NOTE
Pt received from PACU TO GO TO [facility] BED 7  IN NO ACUTE DISTRESS.  EATING COOKIES AND DRINKING DIET COLA.  SPOUSE AT BEDSIDE. 1400 PT WITH PT AT THIS TIME  SEE NOTES OF PT FOR AMBULATING STATUS.  1530  PT SITTING IN CHAIR WITH SPOUSE AT SIDE. 1600 returned to bed  1600 physical therapy i pt room to speak with pt .  Due to ambulating and walking up stairs , pt to stay overnight.  Dr lozano aware. Orders received.    1650 report given to nurse April and pt to go to room 441 A.

## 2024-03-14 NOTE — ANESTHESIA PREPROCEDURE EVALUATION
Patient: Leonel Ceballos    Procedure Information       Date/Time: 03/14/24 0845    Procedure: Open Total Knee Arthroplasty (Left: Knee) - depuy attune  *Pre-op block*    Location: [facility] OR 02 / [facility]    Surgeons: Gabriele Pino MD            Relevant Problems   Cardiovascular   (+) Atrial flutter (CMS/HCC)   (+) Hyperlipidemia   (+) Hypertension   (+) PAC (premature atrial contraction)   (+) Right bundle branch block      GI   (+) Gastroesophageal reflux disease      Musculoskeletal   (+) CMC arthritis, thumb, degenerative   (+) Osteoarthrosis, localized, primary, knee   (+) Primary osteoarthritis of both knees      Infectious Disease   (+) Fungal infection     Past Medical History:   Diagnosis Date    Atrial flutter (CMS/HCC) 09/04/2023    Hyperlipidemia 09/04/2023    Hypertension     Palpitations 09/04/2023    Primary osteoarthritis of both knees 09/04/2023    Right bundle branch block 09/04/2023       Clinical information reviewed:   Tobacco  Allergies  Meds   Med Hx  Surg Hx   Fam Hx  Soc Hx        NPO Detail:  NPO/Void Status  Date of Last Liquid: 03/14/24  Time of Last Liquid: 0500  Date of Last Solid: 03/13/24  Time of Last Void: 0710         Physical Exam    Airway  Mallampati: I  TM distance: >3 FB  Neck ROM: full     Cardiovascular   Comments: deferred   Dental    Pulmonary   Comments: deferred   Abdominal     Comments: deferred           Anesthesia Plan    History of general anesthesia?: yes  History of complications of general anesthesia?: no    ASA 2     regional and spinal   (ACB plus IPACK)  The patient is not a current smoker.    intravenous induction   Postoperative administration of opioids is intended.  Anesthetic plan and risks discussed with patient.    Plan discussed with CRNA.

## 2024-03-14 NOTE — CONSULTS
Inpatient consult to Medicine  Consult performed by: TIA Rios-CNP  Consult ordered by: Gabriele Pino MD          Reason For Consult  HTN     History Of Present Illness  Leonel Ceballos is a 63 y.o. male presenting with left knee pain. S/P open total left knee arthroplasty 3/14/24.     Past Medical History  He has a past medical history of Atrial flutter (CMS/HCC) (09/04/2023), Hyperlipidemia (09/04/2023), Hypertension, Palpitations (09/04/2023), Primary osteoarthritis of both knees (09/04/2023), and Right bundle branch block (09/04/2023).    Surgical History  He has a past surgical history that includes Other surgical history (01/04/2023); Other surgical history (01/04/2023); Total knee arthroplasty (Right, 09/2023); and Tonsillectomy.     Social History  He reports that he has never smoked. He has never used smokeless tobacco. He reports current alcohol use of about 1.0 - 2.0 standard drink of alcohol per week. He reports current drug use. Frequency: 2.00 times per week. Drug: Marijuana.    Family History  Family History   Problem Relation Name Age of Onset    Alzheimer's disease Mother 84     Cancer Father      Lung cancer Father      Other (esophagus) Father      Other (smoker) Father      Dementia Other Grandmother     Diabetes Other mother's side     Heart failure Other mother's side     Coronary artery disease Other mother's side     Prostate cancer Other mother's side         Allergies  Patient has no known allergies.    Review of Systems   Constitutional: Negative.    HENT: Negative.     Eyes: Negative.    Respiratory: Negative.     Cardiovascular: Negative.    Gastrointestinal: Negative.    Endocrine: Negative.    Genitourinary: Negative.    Musculoskeletal:         Left thigh pain post-op   Skin: Negative.    Neurological: Negative.    Psychiatric/Behavioral: Negative.          Physical Exam  Constitutional:       Appearance: Normal appearance.   HENT:      Head: Normocephalic and  atraumatic.      Mouth/Throat:      Mouth: Mucous membranes are moist.      Pharynx: Oropharynx is clear.   Eyes:      Extraocular Movements: Extraocular movements intact.      Conjunctiva/sclera: Conjunctivae normal.      Pupils: Pupils are equal, round, and reactive to light.   Cardiovascular:      Rate and Rhythm: Normal rate and regular rhythm.      Heart sounds: Normal heart sounds.   Pulmonary:      Effort: Pulmonary effort is normal.      Breath sounds: Normal breath sounds.   Abdominal:      General: Bowel sounds are normal.      Palpations: Abdomen is soft.      Tenderness: There is no abdominal tenderness.   Musculoskeletal:      Cervical back: Normal range of motion and neck supple.      Comments: Left knee dressing intact    Skin:     General: Skin is warm and dry.      Capillary Refill: Capillary refill takes less than 2 seconds.   Neurological:      General: No focal deficit present.      Mental Status: He is alert and oriented to person, place, and time.   Psychiatric:         Mood and Affect: Mood normal.         Behavior: Behavior normal.          Last Recorded Vitals  /72   Pulse 93   Temp 36.5 °C (97.7 °F) (Temporal)   Resp 20   SpO2 96%     Relevant Results  No results found for this or any previous visit (from the past 24 hour(s)).    Assessment/Plan     Principal Problem:    Chronic pain of left knee   S/P open total left knee arthroplasty 3/14   PT/OT    Pain management per primary service    DVT prophylaxis per primary service    Plan for discharge with home care    Active Problems:    Atrial flutter (CMS/HCC)  S/P HIS ablation     Gastroesophageal reflux disease   PPI     Hyperlipidemia   Continue statin    Hypertension   Hold losartan/hydrochlorothiazide    Check AM labs    Vital signs per orders       Marlena Vargas, APRN-CNP

## 2024-03-14 NOTE — DISCHARGE INSTRUCTIONS
TOTAL HIP AND KNEE REPLACEMENT DISCHARGE INSTRUCTIONS  Gabriele Pino MD      DRIVING & TRAVEL AFTER SURGERY   Patients should anticipate waiting at least 4-6 weeks before traveling long distances after surgery.  You will need to stop to walk around ever 1 hour during your travel to help with blood clot prevention.  Please call the office or your joint nurse to discuss prior to post-surgical travel.  Patients may not drive until cleared by the joint nurse or the office.    DENTAL PROCEDURES & CLEANINGS  You must wait a minimum of 3 months for elective dental appointments, including routine cleanings or dental work including bridges, crowns, extractions, etc..  For any dental visit - cleaning or dental procedures - patients must take an antibiotic 1 hour before the appointment.  Antibiotics are a lifelong need before dental appointments.  The antibiotic prescribed will be based on each patient's allergies.    WOUND CARE  If you experience continued drainage or bleeding, you may cover with abdominal pads (purchase at local drug store).  Knee replacements should wrap with an ace wrap.  Do not remove surgical dressing, it will be removed when you arrive in clinic for follow up visit.   Mesh dressing under the bandage will remain in place until it peels off on its own.  If it is loose, you may gently remove.  Do not remove if pulling causes resistance against the incision.      PAIN, SWELLING, BRUISING & CLICKING  Pain and swelling are a natural part of your recovery which is considered normal fur up to a year after surgery.  Symptoms may be treated with movement, ice, compression stockings, elevating your leg, and by following the pain medication regimen as prescribed.  Bruising is normal for several weeks after surgery and will run down the leg over time.  You may ice areas that are tender to help with discomfort.  You are required to wear the provided compression stockings, every day, for 4 weeks following surgery.   Remove the stockings at night and place them back on in the morning.  Pain and swelling may temporarily increase with an increase in activity or exercise.  Use ice after activity.  Audible clicking with movement or exercises is considered normal following joint replacement.  You may also feel decreased sensation or numbness near the incision site.  These are usually normal and may or may not fade over time.     PERSONAL HYGIENE  You may shower upon discharging from the hospital.  Soap and water is permitted to run over the surgical dressing, steri-strips and incision.  Do not scrub directly over these items.  DO NOT soak your incision in a bath, hot tub, pool or pond/lake for a minimum of 8 weeks following your surgery.  DO NOT use lotions, creams, ointments on your wound for a minimum of 6 weeks following your surgery. At that time you may use vitamin E to assist with softening of your incision.      RESTARTING HOME ROUTINE - DIET & MEDICATIONS  Post-operative constipation can result due to a combination of inactivity, anesthesia and pain medication. To help prevent this, you should increase your water and fiber intake. Physical activity such as walking will also help stimulate the bowels.   You may resume your normal diet when you discharge home.  Choose foods that help promote good bowel habits and prevent constipation, such as foods high in fiber.  You may restart your home medications the following day after your surgery UNLESS you have been given alternate instructions.  Follow the instructions given to you on your hospital discharge instructions for more information regarding your home medications.      IN-HOME PHYSICAL THERAPY & OUTPATIENT PHYSICAL THERAPY  In-home physical therapy will start 1-2 days after you get home from the hospital.    The home care agency will call within the first 24-48 hours to set up their first visit.  Please do not call your care team to inquire during this timeframe.  Continue  the exercises you were given in the hospital until you have been seen by in-home therapy.  Make sure to provide a phone number with the ability for the home care staff to leave a message if you do not answer your phone.    Outpatient physical therapy following knee replacement surgery should begin 2-3 weeks after surgery.  You should call to schedule this appointment ASAP if not already scheduled before surgery.  Waiting until you are ready for outpatient physical therapy will cause a delay in your care.  You may choose any outpatient physical therapy location.  Call the office for an order if needed.    EMERGENCIES - WHEN TO CONTACT THE SURGEON'S OFFICE IMMEDIATELY  Fever >101 with chills that has been present for at least 48 hours.   Excessive bleeding from incision that will not slow down. A small amount of drainage is normal and expected.  Once pressure is applied and the area is covered, do not continue to check the area regularly.  This will remove pressure and bleeding will continue.  Leave in place for 4-6 hours.  Signs of infection of incision-excessive drainage that is soaking through your dressing (especially if it is pus-like), redness that is spreading out from the edges of your incision, or increased warmth around the area.  Excruciating pain for which the pain medication, taken as instructed, is not helping.  Severe calf pain.  Go directly to the emergency room or call 911, if you are experiencing chest pain or difficulty breathing.    ICE & COLD THERAPY INSTRUCTIONS    To assist with pain control and post-op swelling, you should be using ice regularly throughout recovery, especially for the first 6 weeks, regardless of the cold therapy method you use.      Always make sure there is a layer of protection between the cold pad and your skin.    If you are using ICE PACKS or GEL PACKS, you will need to alternate 20 minutes on, 20 minutes off twice per hour.    If you are using an ICE MACHINE, please  follow the provided ice machine instructions.  These devices differ from ice or ice packs whereas the mechanism circulates water through tubing and a pad to provide longer periods of cold therapy to the desired site.  You can use your cold devices around the clock for optimal comfort.  We recommend using cold therapy after working with therapy or completing exercises on your own.  There is no set schedule in which you must follow while using cold therapy.  Below are a few points to remember when using a cold therapy device:    You do not need to need to use the 20 on, 20 off method.  Detach the pad from the cooler and ambulate at least once every hour.  You can check your skin under the pad at this time.  You may wear the cold therapy device during periods of sleep including overnight.  If you wake up during the night, you can check the skin at this time.  You do not need to wake up specifically to perform skin checks.  Empty the cooler and pad when device is not in use.  Follow 's instructions for cleaning your cold therapy device.      DISCHARGE MEDICATIONS    PAIN MEDICATION    ____ Oxycodone-acetaminophen  Oxycodone-acetaminophen has been prescribed for post-operative pain control.    These medications may only be refilled if neededONCE every 7 days for a period of up to 6 weeks following surgery.  After 6 weeks, you will transition to acetaminophen and over -the- counter anti-inflammatories such as Ibuprofen, Advil or Aleve in conjunction with ICE/COLD THERAPY.   Side effects may be constipation and nausea, vomiting, sleepiness, dizziness, lightheadedness, headache, blurred vision, dry mouth sweating, itching (if you have itching, over-the -counter Benadryl can be used as needed).  You may NOT operate a motor vehicle while taking these medications or have been cleared by your care team.    Please call the office for a refill request.  The office staff and orthopedic nurses cannot refill medications;  messages should be left directly through the office.  Please do not call multiple times or call other members of the care team for medication needs, this will cause the refill to take longer.    Per State and Institutional policy, pain medications can only be refilled every 7 days for up to six weeks following surgery.   .    _______ Naproxen has been prescribed as an adjunct anti-inflammatory to assist in pain control.    Take one 500 mg tablet twice a day for 4 weeks.  You will not receive refills on this medication.   Side effects may include nausea.  May not be prescribed if you are on a more potent blood thinner than aspirin or have chronic kidney disease.    BLOOD THINNER    ____ Blood Thinner   A blood thinner has been prescribed to prevent blood clots in your leg or lungs. Take as prescribed on the bottle for 4 weeks. You will not receive a refill on this medication.      STOOL SOFTENERS    ____ Colace (Docusate Sodium)   Take this medication to help with constipation while using the Oxycodone for pain control.  You will not receive a refill on this medication.    Antibiotics    ____ Cefadroxil or doxycycline  May be prescribed if needed for prophylaxis against infection   Take 7 day course of antibiotics until they are all gone  May not be prescribed if you do not meet criteria for elevated infection risk after surgery          You will not receive refills on the following medications:    Naproxen  Colace  Blood Thinner

## 2024-03-14 NOTE — ANESTHESIA PROCEDURE NOTES
Spinal Block    Patient location during procedure: OR  Start time: 3/14/2024 8:59 AM  End time: 3/14/2024 9:01 AM  Reason for block: primary anesthetic  Staffing  Performed: attending   Authorized by: Juan Jacome MD    Performed by: Juan Jacome MD    Preanesthetic Checklist  Completed: patient identified, IV checked, risks and benefits discussed, surgical consent, pre-op evaluation, timeout performed and sterile techniques followed  Block Timeout  RN/Licensed healthcare professional reads aloud to the Anesthesia provider and entire team: Patient identity, procedure with side and site, patient position, and as applicable the availability of implants/special equipment/special requirements.  Patient on coagulant treatment: no  Timeout performed at: 3/14/2024 8:59 AM  Spinal Block  Patient position: sitting  Prep: Betadine  Sterility prep: cap, drape, gloves, hand hygiene and mask  Sedation level: light sedation  Patient monitoring: blood pressure, continuous pulse oximetry and EKG  Approach: midline  Vertebral space: L3-4  Injection technique: single-shot  Needle  Needle type: Quincke   Needle gauge: 25 G  Needle length: 3.5 in  Free flowing CSF: yes    Assessment bilateral  Block outcome: patient comfortable  Procedure assessment: patient tolerated procedure well with no immediate complications  Additional Notes  Lidocaine 1% infiltration 3mL  Bupivacaine 0.75% w dextrose 8.5% x 1.2mL plus CSF 0.8mL

## 2024-03-14 NOTE — ANESTHESIA PROCEDURE NOTES
Peripheral Block    Patient location during procedure: pre-op  Start time: 3/14/2024 8:40 AM  End time: 3/14/2024 8:41 AM  Reason for block: at surgeon's request and post-op pain management  Staffing  Performed: attending   Authorized by: Juan Jacome MD    Performed by: Juan Jacome MD  Preanesthetic Checklist  Completed: patient identified, IV checked, site marked, risks and benefits discussed, surgical consent, monitors and equipment checked, pre-op evaluation and timeout performed   Timeout performed at: 3/14/2024 8:30 AM  Peripheral Block  Patient position: laying flat  Prep: alcohol swabs, ChloraPrep and site prepped and draped  Patient monitoring: heart rate, cardiac monitor and continuous pulse ox  Block type: other  Laterality: left  Injection technique: single-shot  Guidance: nerve stimulator and ultrasound guided  Needle  Needle type: short-bevel   Needle gauge: 21 G  Needle length: 10 cm  Needle localization: anatomical landmarks and ultrasound guidance  Needle insertion depth: 8 cm  Assessment  Injection assessment: negative aspiration for heme, no paresthesia on injection, local visualized surrounding nerve on ultrasound and incremental injection  Paresthesia pain: none  Heart rate change: no  Slow fractionated injection: yes  Additional Notes  Curved transducer, posteromedial approach, IPACK

## 2024-03-14 NOTE — PROGRESS NOTES
03/14/24 1333   Discharge Planning   Patient expects to be discharged to: Home with [facility]   Does the patient need discharge transport arranged? No       Accepted by [facility].  Discharge today.  SOC=3/15

## 2024-03-14 NOTE — CARE PLAN
Problem: Balance  Goal: dynamic  Description: No LOB with dynamic activities, UE support of walker when standing  Outcome: Progressing     Problem: Mobility  Goal: LTG - Patient will navigate 4-6 steps with rails/device  Outcome: Progressing  Goal: ambulation  Description: Pt will amb > 200  ft with wheeled walker and mod independence   Outcome: Progressing  Goal: ROM  Description: L knee flex 90 degrees  Outcome: Progressing     Problem: Safety  Goal: precautions  Description: Pt will maintain TKA precautions with all mobility.   Outcome: Progressing     Problem: PT Transfers  Goal: sit to stand  Description: Pt will perform sit to stand transfer with walker and mod independence.   Outcome: Progressing

## 2024-03-14 NOTE — ANESTHESIA PROCEDURE NOTES
Peripheral Block    Patient location during procedure: pre-op  Start time: 3/14/2024 8:37 AM  End time: 3/14/2024 8:39 AM  Reason for block: at surgeon's request and post-op pain management  Staffing  Performed: attending   Authorized by: Juan Jacome MD    Performed by: Juan Jacome MD  Preanesthetic Checklist  Completed: patient identified, IV checked, site marked, risks and benefits discussed, surgical consent, monitors and equipment checked, pre-op evaluation and timeout performed   Timeout performed at: 3/14/2024 8:30 AM  Peripheral Block  Patient position: laying flat  Prep: alcohol swabs, ChloraPrep and site prepped and draped  Patient monitoring: heart rate, cardiac monitor and continuous pulse ox  Block type: adductor canal  Laterality: left  Injection technique: single-shot  Guidance: nerve stimulator and ultrasound guided  Needle  Needle gauge: 21 G  Needle length: 10 cm  Needle localization: ultrasound guidance     image stored in chart  Needle insertion depth: 8 cm  Test dose: negative  Assessment  Injection assessment: negative aspiration for heme, no paresthesia on injection, local visualized surrounding nerve on ultrasound and incremental injection  Paresthesia pain: immediately resolved  Heart rate change: no  Slow fractionated injection: yes  Additional Notes  Dexamethasone 10mg added to local anesthetic.

## 2024-03-14 NOTE — OP NOTE
Open Total Knee Arthroplasty (L) Operative Note     Date: 3/14/2024  OR Location: [facility]    Name: Leonel Ceballos, : 1960, Age: 63 y.o., MRN: [id number], Sex: male    Diagnosis  Pre-op Diagnosis     * Chronic pain of left knee [M25.562, G89.29] Post-op Diagnosis     * Chronic pain of left knee [M25.562, G89.29]     Procedures  Open Total Knee Arthroplasty  18019 - [place] ARTHRP KNE CONDYLE&PLATU MEDIAL&LAT COMPARTMENTS      Surgeons      * Gabriele Pino - Primary    Resident/Fellow/Other Assistant:  Surgeon(s) and Role:    Procedure Summary  Anesthesia: Spinal  ASA: II  Anesthesia Staff: Anesthesiologist: Juan Jacome MD  CRNA: TIA Bermeo-ROXANE  Estimated Blood Loss: 50mL  Intra-op Medications:   Administrations occurring from 0845 to 1045 on 24:   Medication Name Total Dose   ropivacaine (Naropin) injection 30 mL   EPINEPHrine (Adrenalin) injection 0.2 mg   sodium chloride (PF) 0.9% solution 20 mL   ketorolac (Toradol) injection 30 mg   morphine injection 4 mg   ceFAZolin in dextrose (iso-os) (Ancef) IVPB 2 g 2 g              Anesthesia Record               Intraprocedure I/O Totals          Intake    Tranexamic Acid 0.00 mL    The total shown is the total volume documented since Anesthesia Start was filed.    Propofol Drip 0.00 mL    The total shown is the total volume documented since Anesthesia Start was filed.    ceFAZolin in dextrose (iso-os) (Ancef) IVPB 2 g 100.00 mL    Total Intake 100 mL       Output    Est. Blood Loss 50 mL    Total Output 50 mL       Net    Net Volume 50 mL          Specimen:   ID Type Source Tests Collected by Time   1 : L knee contents Tissue KNEE CONTENS REAMINGS, LEFT SURGICAL PATHOLOGY EXAM Gabriele Pino MD 3/14/2024 0917        Staff:   Circulator: Shae Claros RN  Scrub Person: Aydee Mace; Daysi Méndez RN         Drains and/or Catheters: * None in log *    Tourniquet Times:   * Missing tourniquet times found for documented tourniquets in  lo *     Implants:  Implants       Type Name Action Serial No.      Implant CEMENT, BONE, SIMPLEX P, RADIOPAQUE, FULL DOSE, 40 GM - [id number] Implanted      Joint TIBAL BASE, ATTUNE RP, POR, SZ 8 - [id number] Implanted      Joint Knee INSERT, ATTUNE PS RP, SZ 8, 6MM - [id number] Implanted      Joint FEMORAL, ATTUNE, POR PS, SIZE 8, LT - [id number] Implanted      Joint Knee DOME, PATELLA, MEDIALIZED, 41MM - [id number] Implanted               Findings: severe djd    Indications: Leonel Ceballos is an 63 y.o. male who is having surgery for Chronic pain of left knee [M25.562, G89.29].     The patient was seen in the preoperative area. The risks, benefits, complications, treatment options, non-operative alternatives, expected recovery and outcomes were discussed with the patient. The possibilities of reaction to medication, pulmonary aspiration, injury to surrounding structures, bleeding, recurrent infection, the need for additional procedures, failure to diagnose a condition, and creating a complication requiring transfusion or operation were discussed with the patient. The patient concurred with the proposed plan, giving informed consent.  The site of surgery was properly noted/marked if necessary per policy. The patient has been actively warmed in preoperative area. Preoperative antibiotics have been ordered and given within 1 hours of incision. Venous thrombosis prophylaxis have been ordered including bilateral sequential compression devices and chemical prophylaxis    Procedure Details: Statement of medical necessity:  This is a 63 y.o.  male with severe degenerative disease of the knee that has failed non operative management. the risks, benefits and alternatives of total hip arthroplasty were discussed with the patient and they signed informed consent.     Description of procedure:   The patient was brought to the operating room and placed on the operating table in the supine position. All bony prominences  were well padded. Appropriate preoperative antibiotics were administered. Anesthesia was induced by the anesthesia team and a time out was performed. The patient was identified by name and medical record number and the laterality and the site of surgery were confirmed by all present.     Under pneumatic tourniquet control, a longitudinal anterior skin incision was made with medial parapatellar arthrotomy. Hemostasis was obtained with Bovie electrocautery. Comprehensive exposure the knee was carried out for total knee arthroplasty. The patella was subluxed laterally and the knee placed in a flexed position.      Using intramedullary alignment, the distal femur was cut in a 5° valgus position. The appropriate-sized femoral component was selected based upon intraoperative measurements of the sagittal dimension of the femur. The distal femoral cutting guide was placed perpendicular to Whitesides line and parallel with the transepicondylar axis, with  3° of external rotation based upon the posterior condylar axis. The distal femur was then finished to accept a posterior stabilized prosthesis.     Attention was then directed to the proximal tibia. Meniscal remnants were excised. Using extramedullary alignment, the proximal tibia was cut perpendicular to its longitudinal axis with a 3° posterior slope. Osteophytes were excised from the posterior femur, medial and lateral femur, and circumferentially about the tibia to avoid soft tissue impingement. The posterior capsule, medial lateral soft tissue structures were injected using combination of Duramorph, Ropivicaine, toradol and epinephrine.Flexion and extension gaps were assessed.     Flexion and extension gaps were equal and rectangular. No ligamentous release was necessary for appropriate balance.     Trial components were placed. Knee achieved full extension and flexion with excellent varus and valgus stability throughout range of motion. The patella tracked centrally.  Tibial component rotation was marked, and the tibia finished in the usual fashion to accept an appropriately sized tibial component.     bone surfaces prepared with pulsatile saline irrigation. Bone quality was assessed and deemed appropriate for press fit implants. Final implants were inserted and impacted into position with excellent stability noted. The final articular polyethyelne surface was then inserted.      The patella was assessed and its surfaces judged appropriate for prosthetic resurfacing patellar arthroplasty. Patellar was measured and cut using an oscillating saw.  It was prepared for prosthetic resurfacing in the standard fashion. Additionally,  excision of synovium from the periphery of the patella and circumferential electrocautery and excision of ostephytes was also performed. with the trial component in place there was excellent patellar tracking and mechanics.    With the final implants in placed the mechanics of the knee were once again confirmed and noted to be excellent.      Joint was irrigated with dilute betadine solution, followed by normal saline.   The arthrotomy was reapproximated using #1 poly and #2 quill sutures. Subcutaneous layer was closed with 2-0 poly, subcuticular layer with 3-0 v-lock, then perineo mesh and glue dressing was placed followed by mepilex, cast padding, and ace wrap.     Patient was awoken from anesthesia and brought to the pacu in stable position.     Statement of staff presence: I was present and scrubbed for all critical portions of the procedure and was immediately available during closing      Complications:  None; patient tolerated the procedure well.    Disposition: PACU - hemodynamically stable.  Condition: stable         Additional Details: none    Attending Attestation: I was present and scrubbed for the key portions of the procedure.    Gabriele Pino  Phone Number: [phone]

## 2024-03-15 VITALS
TEMPERATURE: 97.9 F | OXYGEN SATURATION: 93 % | BODY MASS INDEX: 30.63 KG/M2 | RESPIRATION RATE: 16 BRPM | HEIGHT: 69 IN | DIASTOLIC BLOOD PRESSURE: 72 MMHG | WEIGHT: 206.79 LBS | HEART RATE: 65 BPM | SYSTOLIC BLOOD PRESSURE: 144 MMHG

## 2024-03-15 LAB
ANION GAP SERPL CALC-SCNC: 7 MMOL/L
BUN SERPL-MCNC: 13 MG/DL (ref 8–25)
CALCIUM SERPL-MCNC: 9.5 MG/DL (ref 8.5–10.4)
CHLORIDE SERPL-SCNC: 98 MMOL/L (ref 97–107)
CO2 SERPL-SCNC: 29 MMOL/L (ref 24–31)
CREAT SERPL-MCNC: 0.9 MG/DL (ref 0.4–1.6)
EGFRCR SERPLBLD CKD-EPI 2021: >90 ML/MIN/1.73M*2
ERYTHROCYTE [DISTWIDTH] IN BLOOD BY AUTOMATED COUNT: 12.4 % (ref 11.5–14.5)
GLUCOSE SERPL-MCNC: 131 MG/DL (ref 65–99)
HCT VFR BLD AUTO: 36.2 % (ref 41–52)
HGB BLD-MCNC: 12.3 G/DL (ref 13.5–17.5)
MCH RBC QN AUTO: 30.5 PG (ref 26–34)
MCHC RBC AUTO-ENTMCNC: 34 G/DL (ref 32–36)
MCV RBC AUTO: 90 FL (ref 80–100)
NRBC BLD-RTO: 0 /100 WBCS (ref 0–0)
PLATELET # BLD AUTO: 200 X10*3/UL (ref 150–450)
POTASSIUM SERPL-SCNC: 4.5 MMOL/L (ref 3.4–5.1)
RBC # BLD AUTO: 4.03 X10*6/UL (ref 4.5–5.9)
SODIUM SERPL-SCNC: 134 MMOL/L (ref 133–145)
WBC # BLD AUTO: 18.6 X10*3/UL (ref 4.4–11.3)

## 2024-03-15 PROCEDURE — 7100000011 HC EXTENDED STAY RECOVERY HOURLY - NURSING UNIT

## 2024-03-15 PROCEDURE — 97110 THERAPEUTIC EXERCISES: CPT | Mod: GP,CQ

## 2024-03-15 PROCEDURE — 2500000001 HC RX 250 WO HCPCS SELF ADMINISTERED DRUGS (ALT 637 FOR MEDICARE OP): Performed by: ORTHOPAEDIC SURGERY

## 2024-03-15 PROCEDURE — 80048 BASIC METABOLIC PNL TOTAL CA: CPT | Performed by: ORTHOPAEDIC SURGERY

## 2024-03-15 PROCEDURE — 2500000004 HC RX 250 GENERAL PHARMACY W/ HCPCS (ALT 636 FOR OP/ED): Performed by: ORTHOPAEDIC SURGERY

## 2024-03-15 PROCEDURE — 85027 COMPLETE CBC AUTOMATED: CPT | Performed by: ORTHOPAEDIC SURGERY

## 2024-03-15 PROCEDURE — 97116 GAIT TRAINING THERAPY: CPT | Mod: GP,CQ

## 2024-03-15 PROCEDURE — 36415 COLL VENOUS BLD VENIPUNCTURE: CPT | Performed by: ORTHOPAEDIC SURGERY

## 2024-03-15 PROCEDURE — 2500000001 HC RX 250 WO HCPCS SELF ADMINISTERED DRUGS (ALT 637 FOR MEDICARE OP): Performed by: NURSE PRACTITIONER

## 2024-03-15 RX ADMIN — OXYCODONE AND ACETAMINOPHEN 1 TABLET: 10; 325 TABLET ORAL at 01:41

## 2024-03-15 RX ADMIN — PANTOPRAZOLE SODIUM 40 MG: 40 TABLET, DELAYED RELEASE ORAL at 06:18

## 2024-03-15 RX ADMIN — CYCLOBENZAPRINE 10 MG: 10 TABLET, FILM COATED ORAL at 09:54

## 2024-03-15 RX ADMIN — ASPIRIN 81 MG: 81 TABLET, COATED ORAL at 08:04

## 2024-03-15 RX ADMIN — SODIUM CHLORIDE, SODIUM LACTATE, POTASSIUM CHLORIDE, AND CALCIUM CHLORIDE 100 ML/HR: 600; 310; 30; 20 INJECTION, SOLUTION INTRAVENOUS at 01:41

## 2024-03-15 RX ADMIN — OXYCODONE AND ACETAMINOPHEN 1 TABLET: 10; 325 TABLET ORAL at 06:18

## 2024-03-15 RX ADMIN — ATORVASTATIN CALCIUM 10 MG: 10 TABLET, FILM COATED ORAL at 08:04

## 2024-03-15 RX ADMIN — ACETAMINOPHEN 650 MG: 325 TABLET ORAL at 00:13

## 2024-03-15 RX ADMIN — CEFAZOLIN SODIUM 2 G: 2 INJECTION, SOLUTION INTRAVENOUS at 01:41

## 2024-03-15 ASSESSMENT — COGNITIVE AND FUNCTIONAL STATUS - GENERAL
MOBILITY SCORE: 20
CLIMB 3 TO 5 STEPS WITH RAILING: A LITTLE
MOBILITY SCORE: 19
MOVING TO AND FROM BED TO CHAIR: A LITTLE
DAILY ACTIVITIY SCORE: 23
STANDING UP FROM CHAIR USING ARMS: A LITTLE
WALKING IN HOSPITAL ROOM: A LITTLE
CLIMB 3 TO 5 STEPS WITH RAILING: A LOT
MOVING TO AND FROM BED TO CHAIR: A LITTLE
DRESSING REGULAR LOWER BODY CLOTHING: A LITTLE
WALKING IN HOSPITAL ROOM: A LITTLE
STANDING UP FROM CHAIR USING ARMS: A LITTLE

## 2024-03-15 ASSESSMENT — PAIN - FUNCTIONAL ASSESSMENT
PAIN_FUNCTIONAL_ASSESSMENT: 0-10

## 2024-03-15 ASSESSMENT — PAIN SCALES - GENERAL
PAINLEVEL_OUTOF10: 2
PAINLEVEL_OUTOF10: 0 - NO PAIN
PAINLEVEL_OUTOF10: 6
PAINLEVEL_OUTOF10: 0 - NO PAIN

## 2024-03-15 ASSESSMENT — PAIN DESCRIPTION - ORIENTATION: ORIENTATION: LEFT

## 2024-03-15 ASSESSMENT — PAIN DESCRIPTION - DESCRIPTORS: DESCRIPTORS: ACHING;DULL

## 2024-03-15 ASSESSMENT — PAIN DESCRIPTION - LOCATION: LOCATION: KNEE

## 2024-03-15 NOTE — DISCHARGE SUMMARY
Discharge Diagnosis  Chronic pain of left knee    Issues Requiring Follow-Up  Follow-up in orthopedic clinic 2 weeks    Test Results Pending At Discharge  Pending Labs       Order Current Status    Surgical Pathology Exam In process            Hospital Course   Patient was admitted s/p Procedure(s) (LRB):  Open Total Knee Arthroplasty (Left). Appropriate antibiotics, dvt prophylaxis, and supportive care given. Cleared by PT and medicine for d/c      Pertinent Physical Exam At Time of Discharge  Physical Exam    Home Medications     Medication List      START taking these medications     cefadroxil 500 mg capsule; Commonly known as: Duricef; Take 1 capsule   (500 mg) by mouth 2 times a day.   docusate sodium 100 mg capsule; Commonly known as: Colace; Take 1   capsule (100 mg) by mouth 2 times a day as needed for constipation.   naproxen 500 mg tablet; Commonly known as: Naprosyn; Take 1 tablet (500   mg) by mouth 2 times a day.   ondansetron ODT 4 mg disintegrating tablet; Commonly known as:   Zofran-ODT; Take 1 tablet (4 mg) by mouth every 8 hours if needed for   nausea or vomiting.   oxyCODONE-acetaminophen 5-325 mg tablet; Commonly known as: Percocet;   Take 1 tablet by mouth every 4 hours if needed for severe pain (7 - 10).     CHANGE how you take these medications     * aspirin 81 mg EC tablet; TAKE 1 TABLET BY MOUTH TWO TIMES A DAY; What   changed: Another medication with the same name was added. Make sure you   understand how and when to take each.   * aspirin 81 mg EC tablet; Take 1 tablet (81 mg) by mouth 2 times a   day.; What changed: You were already taking a medication with the same   name, and this prescription was added. Make sure you understand how and   when to take each.  * This list has 2 medication(s) that are the same as other medications   prescribed for you. Read the directions carefully, and ask your doctor or   other care provider to review them with you.     CONTINUE taking these medications      Aleve 220 mg capsule; Generic drug: naproxen sodium   ascorbic acid 1,000 mg tablet; Commonly known as: Vitamin C   atorvastatin 10 mg tablet; Commonly known as: Lipitor; Take 1 tablet (10   mg) by mouth once daily.   chlorhexidine 0.12 % solution; Commonly known as: Peridex; Use cap to   measure 15 mL.  Swish/gargle mouthwash for at least 30 seconds.  Do not   swallow.  Use night before surgery after brushing teeth and morning of   surgery after brushing teeth.   DAILY MULTI-VITAMIN ORAL   losartan-hydrochlorothiazide 50-12.5 mg tablet; Commonly known as:   Hyzaar   meloxicam 15 mg tablet; Commonly known as: Mobic; TAKE 1 TABLET EVERY   DAY AS NEEDED   MSM ORAL   omeprazole 20 mg DR capsule; Commonly known as: PriLOSEC; TAKE 1 CAPSULE   BY MOUTH EVERY OTHER DAY   tamsulosin 0.4 mg 24 hr capsule; Commonly known as: Flomax   ZyrTEC 10 mg capsule; Generic drug: cetirizine     ASK your doctor about these medications     tadalafil 20 mg tablet; Commonly known as: Cialis; Take 1 tablet (20 mg)   by mouth once daily as needed for erectile dysfunction.       Outpatient Follow-Up  Future Appointments   Date Time Provider Department Center   3/16/2024 To Be Determined Tanya Mosqueda PT [facility]   3/27/2024  9:30 AM Gabriele Pino MD [id number] [facility]   4/10/2024  3:45 PM Ilya Rodriguez MD [id number] [facility]   12/23/2024  3:30 PM Curry Mejia MD [id number] [facility]       Gabriele Pino MD

## 2024-03-15 NOTE — PROGRESS NOTES
Physical Therapy    Physical Therapy Treatment    Patient Name: Leonel Ceballos  MRN: [id number]  Today's Date: 3/15/2024  Time Calculation  Start Time: 0715  Stop Time: 0805  Time Calculation (min): 50 min       Assessment/Plan   PT Assessment  Rehab Prognosis: Good  End of Session Communication: Bedside nurse (Saw by therapy)  End of Session Patient Position: Up in chair, Alarm off, not on at start of session     PT Plan  Treatment/Interventions: Bed mobility, Transfer training, Gait training, Stair training, Balance training, Strengthening, Endurance training, Therapeutic exercise, Therapeutic activity  PT Plan: Skilled PT  PT Frequency: BID  PT Discharge Recommendations: Low intensity level of continued care  Equipment Recommended upon Discharge: Wheeled walker  PT Recommended Transfer Status: Assist x1, Assistive device  PT - OK to Discharge: Yes      General Visit Information:   PT  Visit  PT Received On: 03/15/24  General  Reason for Referral: recent surgery; s/p L TKA  Referred By: Dr. Pino  Past Medical History Relevant to Rehab: OA, A flutter, HLD, HTN  Missed Visit: No  Prior to Session Communication: Bedside nurse  Patient Position Received: Bed, 2 rail up, Alarm off, not on at start of session  Preferred Learning Style: verbal  General Comment: Cleared by nurse to see. Patient agreeable    Subjective   Precautions:  Precautions  LE Weight Bearing Status: Weight Bearing as Tolerated  Medical Precautions: Fall precautions  Post-Surgical Precautions: Left total knee precautions  Vital Signs:       Objective   Pain:  Pain Assessment  Pain Assessment: 0-10  Pain Score: 2  Pain Type: Surgical pain  Pain Location: Knee  Pain Orientation: Left  Pain Descriptors: Aching, Dull  Pain Frequency: Constant/continuous  Pain Interventions: Cold pack  Cognition:     Postural Control:  Postural Control  Posture Comment: rounded shoulders  Static Sitting Balance  Static Sitting-Level of Assistance: Close  supervision  Static Standing Balance  Static Standing-Level of Assistance: Contact guard  Static Standing-Comment/Number of Minutes: B UE support on walker  Extremity/Trunk Assessments:    Activity Tolerance:     Treatments:       Bed Mobility  Bed Mobility: Yes  Bed Mobility 1  Bed Mobility 1: Supine to sitting  Level of Assistance 1: Close supervision  Bed Mobility Comments 1: HOB elevated    Ambulation/Gait Training  Ambulation/Gait Training Performed: Yes  Ambulation/Gait Training 1  Surface 1: Level tile  Device 1: Rolling walker  Assistance 1: Close supervision  Comments/Distance (ft) 1: 200' x 2 with RW with  with slow reciprocal gait  Transfers  Transfer: Yes  Transfer 1  Transfer From 1: Bed to  Transfer to 1: Stand  Technique 1: Sit to stand  Transfer Device 1: Walker  Transfer Level of Assistance 1: Close supervision  Trials/Comments 1: x 1 trial STS from bed  Transfers 2  Transfer From 2: Stand to  Transfer to 2: Sit, Chair with arms  Technique 2: Stand to sit, Sit to stand  Transfer Device 2: Walker  Transfer Level of Assistance 2: Close supervision  Trials/Comments 2: x 3 trials STS to/ from chair with arms Cues to slightly extend L LE with sitting/ standing    Stairs  Stairs: Yes  Stairs  Rails 1: Left  Curb Step 1: Yes  Device 1: Railing  Assistance 1: Contact guard  Comment/Number of Steps 1: Rail left cane right Up 4 steps with R LE then L LE then cane with contact guard assist. Down 4 steps with rail on right Cane L LE then R LE with contact guard assist  Curb step  Up walker then R LE then L LE with contactv bguard assist. Down curb step walker, L LE then R LE with contact guard assist    Outcome Measures:  [facility] Basic Mobility  Turning from your back to your side while in a flat bed without using bedrails: None  Moving from lying on your back to sitting on the side of a flat bed without using bedrails: None  Moving to and from bed to chair (including a wheelchair): A little  Standing up from a  chair using your arms (e.g. wheelchair or bedside chair): A little  To walk in hospital room: A little  Climbing 3-5 steps with railing: A little  Basic Mobility - Total Score: 20    Education Documentation  Handouts, taught by Lucero Reed PTA at 3/15/2024  8:23 AM.  Learner: Patient  Readiness: Acceptance  Method: Explanation  Response: Verbalizes Understanding    Precautions, taught by Lucero Reed PTA at 3/15/2024  8:23 AM.  Learner: Patient  Readiness: Acceptance  Method: Explanation  Response: Verbalizes Understanding    Body Mechanics, taught by Lucero Reed PTA at 3/15/2024  8:23 AM.  Learner: Patient  Readiness: Acceptance  Method: Explanation  Response: Verbalizes Understanding    Home Exercise Program, taught by Lucero Reed PTA at 3/15/2024  8:23 AM.  Learner: Patient  Readiness: Acceptance  Method: Explanation  Response: Verbalizes Understanding    Mobility Training, taught by Lucero Reed PTA at 3/15/2024  8:23 AM.  Learner: Patient  Readiness: Acceptance  Method: Explanation  Response: Verbalizes Understanding    Education Comments  No comments found.        OP EDUCATION:  Outpatient Education  Education Comment: HEP / steps    Encounter Problems       Encounter Problems (Active)       Balance       dynamic (Progressing)       Start:  03/14/24    Expected End:  03/21/24       No LOB with dynamic activities, UE support of walker when standing            Mobility       LTG - Patient will navigate 4-6 steps with rails/device (Progressing)       Start:  03/14/24    Expected End:  03/21/24            ambulation (Progressing)       Start:  03/14/24    Expected End:  03/21/24       Pt will amb > 200  ft with wheeled walker and mod independence          ROM (Progressing)       Start:  03/14/24    Expected End:  03/21/24       L knee flex 90 degrees            PT Transfers       sit to stand (Progressing)       Start:  03/14/24    Expected End:  03/21/24       Pt will perform sit  to stand transfer with walker and mod independence.             Safety       precautions (Progressing)       Start:  03/14/24    Expected End:  03/21/24       Pt will maintain TKA precautions with all mobility.

## 2024-03-15 NOTE — PROGRESS NOTES
63 y.o. male s/p Procedure(s) (LRB):  Open Total Knee Arthroplasty (Left)    Patient seen and examined  Pain controlled  No acute events      Physical exam  Left LE  5/5 df/pf ankle/great toe  Lampe s/s/sp/dp/t  2+ dp pulse    Post op XR reviewed, expected post operative changes    63 y.o. male s/p Procedure(s) (LRB):  Open Total Knee Arthroplasty (Left)  - WBAT left LE  Postoperative foot drop completely resolved.  - dvt ppx  - ancef x 24h  - discharge after a.m. PT

## 2024-03-15 NOTE — NURSING NOTE
Patient resting quietly in bed. Respirations even and unlabored. Iceman intact to left knee. NAD noted. Call light within patient's reach. Will continue to monitor.

## 2024-03-15 NOTE — PROGRESS NOTES
03/15/24 0858   Discharge Planning   Living Arrangements Spouse/significant other   Support Systems Spouse/significant other   Type of Residence Private residence   Number of Stairs to Enter Residence 3   Number of Stairs Within Residence 13   Home or Post Acute Services In home services   Type of Home Care Services Home PT;Home OT   Patient expects to be discharged to: Home with [facility]- SOC 3/16   Does the patient need discharge transport arranged? No       Home with [facility] - Start of Care is tomorrow 3/16.

## 2024-03-16 ENCOUNTER — HOME CARE VISIT (OUTPATIENT)
Dept: HOME HEALTH SERVICES | Facility: HOME HEALTH | Age: 64
End: 2024-03-16
Payer: MEDICARE

## 2024-03-16 VITALS
DIASTOLIC BLOOD PRESSURE: 58 MMHG | HEART RATE: 67 BPM | OXYGEN SATURATION: 97 % | TEMPERATURE: 98.7 F | SYSTOLIC BLOOD PRESSURE: 120 MMHG

## 2024-03-16 PROCEDURE — 0023 HH SOC

## 2024-03-16 PROCEDURE — G0151 HHCP-SERV OF PT,EA 15 MIN: HCPCS

## 2024-03-16 ASSESSMENT — ENCOUNTER SYMPTOMS
PAIN: 1
PAIN LOCATION: LEFT LEG
PAIN LOCATION - PAIN SEVERITY: 3/10
PERSON REPORTING PAIN: PATIENT
HIGHEST PAIN SEVERITY IN PAST 24 HOURS: 7/10
LOWEST PAIN SEVERITY IN PAST 24 HOURS: 2/10

## 2024-03-16 ASSESSMENT — ACTIVITIES OF DAILY LIVING (ADL)
OASIS_M1830: 03
ENTERING_EXITING_HOME: NEEDS ASSISTANCE

## 2024-03-18 LAB
LABORATORY COMMENT REPORT: NORMAL
PATH REPORT.FINAL DX SPEC: NORMAL
PATH REPORT.GROSS SPEC: NORMAL
PATH REPORT.RELEVANT HX SPEC: NORMAL
PATH REPORT.TOTAL CANCER: NORMAL

## 2024-03-19 ENCOUNTER — HOME CARE VISIT (OUTPATIENT)
Dept: HOME HEALTH SERVICES | Facility: HOME HEALTH | Age: 64
End: 2024-03-19
Payer: MEDICARE

## 2024-03-19 VITALS
OXYGEN SATURATION: 96 % | SYSTOLIC BLOOD PRESSURE: 150 MMHG | TEMPERATURE: 98 F | RESPIRATION RATE: 16 BRPM | DIASTOLIC BLOOD PRESSURE: 86 MMHG | HEART RATE: 86 BPM

## 2024-03-19 PROCEDURE — G0151 HHCP-SERV OF PT,EA 15 MIN: HCPCS

## 2024-03-19 SDOH — HEALTH STABILITY: PHYSICAL HEALTH
EXERCISE COMMENTS: PT INSTRUCTED PATIENT IN EXERCISES PER TKA PROTOCOL 1X15:  - ANKLE PUMPS  - GLUT SETS  - QUAD SETS  - HEELSLIDES  - HIP ABDUCTION  - SAQ  - LAQ  - SLR  - SEATED KNEE FLEXION STRETCH  - SEATED KNEE EXTENSION STRETCH  - SEATED HIP FLEXION

## 2024-03-19 ASSESSMENT — ENCOUNTER SYMPTOMS
PAIN LOCATION: LEFT KNEE
PERSON REPORTING PAIN: PATIENT
PAIN: 1
PAIN LOCATION - PAIN FREQUENCY: CONSTANT
PAIN LOCATION - PAIN SEVERITY: 4/10
LOWEST PAIN SEVERITY IN PAST 24 HOURS: 3/10
PAIN LOCATION - RELIEVING FACTORS: MEDICATION, ICE
HIGHEST PAIN SEVERITY IN PAST 24 HOURS: 6/10

## 2024-03-21 PROCEDURE — G0180 MD CERTIFICATION HHA PATIENT: HCPCS | Performed by: ORTHOPAEDIC SURGERY

## 2024-03-22 ENCOUNTER — HOME CARE VISIT (OUTPATIENT)
Dept: HOME HEALTH SERVICES | Facility: HOME HEALTH | Age: 64
End: 2024-03-22
Payer: MEDICARE

## 2024-03-22 VITALS
OXYGEN SATURATION: 94 % | DIASTOLIC BLOOD PRESSURE: 76 MMHG | TEMPERATURE: 99.1 F | RESPIRATION RATE: 18 BRPM | HEART RATE: 64 BPM | SYSTOLIC BLOOD PRESSURE: 144 MMHG

## 2024-03-22 PROCEDURE — G0151 HHCP-SERV OF PT,EA 15 MIN: HCPCS

## 2024-03-22 SDOH — HEALTH STABILITY: PHYSICAL HEALTH
EXERCISE COMMENTS: PT INSTRUCTED PATIENT IN EXERCISES PER TKA PROTOCOL 1X10:  - ANKLE PUMPS  - GLUT SETS  - QUAD SETS  - HEELSLIDES  - HIP ABDUCTION  - SAQ  - LAQ  - SLR  - SEATED KNEE FLEXION STRETCH

## 2024-03-22 ASSESSMENT — ENCOUNTER SYMPTOMS
PAIN LOCATION: LEFT KNEE
PAIN: 1
PAIN LOCATION - PAIN SEVERITY: 4/10
LOWEST PAIN SEVERITY IN PAST 24 HOURS: 2/10
PERSON REPORTING PAIN: PATIENT
PAIN LOCATION - PAIN QUALITY: BURNING, ACHY
HIGHEST PAIN SEVERITY IN PAST 24 HOURS: 4/10

## 2024-03-26 ENCOUNTER — HOME CARE VISIT (OUTPATIENT)
Dept: HOME HEALTH SERVICES | Facility: HOME HEALTH | Age: 64
End: 2024-03-26
Payer: MEDICARE

## 2024-03-26 PROCEDURE — G0151 HHCP-SERV OF PT,EA 15 MIN: HCPCS

## 2024-03-26 SDOH — HEALTH STABILITY: PHYSICAL HEALTH: EXERCISE COMMENTS: ING KNEE FLEXION  - STANDING ALTERNATING HIP FLEXION

## 2024-03-26 SDOH — HEALTH STABILITY: PHYSICAL HEALTH
EXERCISE COMMENTS: PT INSTRUCTED PATIENT IN EXERCISES PER TKA PROTOCOL 1X10:  - ANKLE PUMPS  - GLUT SETS  - QUAD SETS  - HEELSLIDES  - HIP ABDUCTION  - SAQ  - LAQ  - SLR  - SEATED KNEE FLEXION STRETCH  - SEATED KNEE EXTENSION STRETCH  - STANDING KNEE EXTENSION  - STAND

## 2024-03-26 ASSESSMENT — ENCOUNTER SYMPTOMS
SUBJECTIVE PAIN PROGRESSION: WAXING AND WANING
PAIN: 1
PERSON REPORTING PAIN: PATIENT

## 2024-03-27 ENCOUNTER — OFFICE VISIT (OUTPATIENT)
Dept: ORTHOPEDIC SURGERY | Facility: CLINIC | Age: 64
End: 2024-03-27
Payer: MEDICARE

## 2024-03-27 ENCOUNTER — HOSPITAL ENCOUNTER (OUTPATIENT)
Dept: RADIOLOGY | Facility: CLINIC | Age: 64
Discharge: HOME | End: 2024-03-27
Payer: MEDICARE

## 2024-03-27 DIAGNOSIS — M25.562 CHRONIC PAIN OF LEFT KNEE: ICD-10-CM

## 2024-03-27 DIAGNOSIS — G89.29 CHRONIC PAIN OF LEFT KNEE: ICD-10-CM

## 2024-03-27 DIAGNOSIS — G89.29 CHRONIC PAIN OF LEFT KNEE: Primary | ICD-10-CM

## 2024-03-27 DIAGNOSIS — M25.562 CHRONIC PAIN OF LEFT KNEE: Primary | ICD-10-CM

## 2024-03-27 PROCEDURE — 99024 POSTOP FOLLOW-UP VISIT: CPT | Performed by: ORTHOPAEDIC SURGERY

## 2024-03-27 PROCEDURE — 1036F TOBACCO NON-USER: CPT | Performed by: ORTHOPAEDIC SURGERY

## 2024-03-27 PROCEDURE — 73564 X-RAY EXAM KNEE 4 OR MORE: CPT | Mod: LT

## 2024-03-27 PROCEDURE — 73564 X-RAY EXAM KNEE 4 OR MORE: CPT | Mod: LEFT SIDE | Performed by: RADIOLOGY

## 2024-03-27 PROCEDURE — 1157F ADVNC CARE PLAN IN RCRD: CPT | Performed by: ORTHOPAEDIC SURGERY

## 2024-03-27 PROCEDURE — 1160F RVW MEDS BY RX/DR IN RCRD: CPT | Performed by: ORTHOPAEDIC SURGERY

## 2024-03-27 PROCEDURE — 1159F MED LIST DOCD IN RCRD: CPT | Performed by: ORTHOPAEDIC SURGERY

## 2024-03-27 RX ORDER — OXYCODONE AND ACETAMINOPHEN 5; 325 MG/1; MG/1
1 TABLET ORAL EVERY 4 HOURS PRN
Qty: 36 TABLET | Refills: 0 | Status: SHIPPED | OUTPATIENT
Start: 2024-03-27

## 2024-03-27 NOTE — PROGRESS NOTES
Chief Complaint   Patient presents with    Left Knee - Post-op     3/14/24 LT TKA         This is a 63 y.o. male who is 2 weeks out from left total knee.  Pain is under control with current medications.  No drainage from his incision no fevers or chills.  Progressing well with physical therapy and appropriately improving in function.  No other new issues or symptoms.    Left knee examination: Surgical incision well approximated no erythema no drainage.  Stable to varus valgus stress range of motion 0-100.  Neurovascular tact distally    X-rays of the knee were reviewed independently interpreted by me today, the show stable total knee arthroplasty no fracture dislocation or loosening    Impression plan: 63 y.o. male 2 weeks out from left total knee.  We discussed continuing physical therapy and home exercise program. Pain medications to be used as needed. Assistive devices as needed per PT. We discussed DVT prophylaxis until 6 weeks. No dentist until 3 months and thereafter dental prophylaxis for life. Activity as tolerated weightbearing as tolerated. I have personally reviewed the OARRS report for the patient. This report is scanned into the electronic medical record. I have considered the risks of abuse, dependence, addiction and diversion. Follow up 4 weeks with xrays.  Pain 5 out of 10

## 2024-03-28 ENCOUNTER — HOME CARE VISIT (OUTPATIENT)
Dept: HOME HEALTH SERVICES | Facility: HOME HEALTH | Age: 64
End: 2024-03-28
Payer: MEDICARE

## 2024-03-28 VITALS
DIASTOLIC BLOOD PRESSURE: 70 MMHG | TEMPERATURE: 98.3 F | SYSTOLIC BLOOD PRESSURE: 150 MMHG | OXYGEN SATURATION: 96 % | RESPIRATION RATE: 16 BRPM | HEART RATE: 61 BPM

## 2024-03-28 PROCEDURE — G0151 HHCP-SERV OF PT,EA 15 MIN: HCPCS

## 2024-03-28 SDOH — HEALTH STABILITY: PHYSICAL HEALTH: EXERCISE COMMENTS: ING KNEE FLEXION  - STANDING ALTERNATING HIP FLEXION  - STANDING PF/DF

## 2024-03-28 ASSESSMENT — ENCOUNTER SYMPTOMS
PERSON REPORTING PAIN: PATIENT
PAIN: 1

## 2024-03-28 ASSESSMENT — ACTIVITIES OF DAILY LIVING (ADL)
OASIS_M1830: 00
HOME_HEALTH_OASIS: 00

## 2024-04-03 ENCOUNTER — EVALUATION (OUTPATIENT)
Dept: PHYSICAL THERAPY | Facility: CLINIC | Age: 64
End: 2024-04-03
Payer: MEDICARE

## 2024-04-03 DIAGNOSIS — G89.29 CHRONIC PAIN OF LEFT KNEE: ICD-10-CM

## 2024-04-03 DIAGNOSIS — M25.562 CHRONIC PAIN OF LEFT KNEE: ICD-10-CM

## 2024-04-03 PROCEDURE — 97110 THERAPEUTIC EXERCISES: CPT | Mod: GP | Performed by: PHYSICAL THERAPIST

## 2024-04-03 PROCEDURE — 97016 VASOPNEUMATIC DEVICE THERAPY: CPT | Mod: GP | Performed by: PHYSICAL THERAPIST

## 2024-04-03 PROCEDURE — 97112 NEUROMUSCULAR REEDUCATION: CPT | Mod: GP | Performed by: PHYSICAL THERAPIST

## 2024-04-03 PROCEDURE — 97161 PT EVAL LOW COMPLEX 20 MIN: CPT | Mod: GP | Performed by: PHYSICAL THERAPIST

## 2024-04-03 ASSESSMENT — ENCOUNTER SYMPTOMS
OCCASIONAL FEELINGS OF UNSTEADINESS: 0
LOSS OF SENSATION IN FEET: 0
DEPRESSION: 0

## 2024-04-03 ASSESSMENT — PAIN - FUNCTIONAL ASSESSMENT: PAIN_FUNCTIONAL_ASSESSMENT: 0-10

## 2024-04-03 ASSESSMENT — PAIN SCALES - GENERAL: PAINLEVEL_OUTOF10: 5 - MODERATE PAIN

## 2024-04-03 ASSESSMENT — PATIENT HEALTH QUESTIONNAIRE - PHQ9
2. FEELING DOWN, DEPRESSED OR HOPELESS: NOT AT ALL
SUM OF ALL RESPONSES TO PHQ9 QUESTIONS 1 AND 2: 0
1. LITTLE INTEREST OR PLEASURE IN DOING THINGS: NOT AT ALL

## 2024-04-03 NOTE — PROGRESS NOTES
Physical Therapy  Physical Therapy Orthopedic Evaluation      Patient Name: Leonel Ceballos  MRN: [id number]  Today's Date: 4/3/2024  Time Calculation  Start Time: 1450  Stop Time: 1545  Time Calculation (min): 55 min  PT Evaluation Time Entry  PT Evaluation (Low) Time Entry: 15  PT Therapeutic Procedures Time Entry  Neuromuscular Re-Education Time Entry: 8  Therapeutic Exercise Time Entry: 17  PT Modalities Time Entry  Vasopneumatic Devices Time Entry: 10    Insurance:    Number of Treatments Authorized: 1 of Med [facility]  Certification Period Start Date: 04/03/24     Insurance Type: Payor: [employer] MEDICARE / Plan: [employer] MEDICARE / Product Type: *No Product type* /     Current Problem  1. Chronic pain of left knee  Referral to Physical Therapy          General:  Reason for Referral: s/p L TKA (DOS: 3/14/2024)  Referred By: Dr. Gabriele Pino    Past Medical History       Precautions:   Precautions  STEADI Fall Risk Score (The score of 4 or more indicates an increased risk of falling): 2  Precautions Comment: None    Medical History Form: Reviewed (scanned into chart)    Subjective:   Subjective   General Comment: Patient presents s/p L TKA due to long standing OA. He has been doing well since th surgery but continues to have increased pain/soreness diffusely in the knee. He notes that he continues to have L lower leg pain in the posterior muscles that was present prior to the surgery. He had an US and MRI prior to surgery but no specific cause was identified.    Onset Date: Onset Date: 03/15/24    Current Condition:   Better    Prior Functional Level: Prior Function Per Pt/Caregiver Report  Level of [place]: Independent with ADLs and functional transfers  Vocational: Retired    Pain:  Pain Assessment: 0-10  Pain Score: 5 - Moderate pain  Pain Location: Knee (#1 (I,V):)    Previous Interventions/Treatments/Previous Tests & Imaging: Physical Therapy Comment: Medical Management: Home PT x 2  "weeks; Prescription Pain Medication    Patients Living Environment: Home Living Comment: [facility]-Story Home    Primary Language: English    Patient's Goal(s) for Therapy: Reduce pain and improve ROM and strength to be able to walk \"normal\"    Red Flags: Do you have any of the following?         Red Flags: None    Objective:  Objective   KNEE    Observation  Observation Comment: Diffuse swelling L>R knee; Girth: Med Jt line: R 39.5cm, L 41cm; Slight diffuse swelling noted in L lower leg; Girth 15cm distal med. jt line: R 37cm, L 37.7cm  Knee Palpation/Joint Mobility  Palpation/Joint Mobility Comment: No tenderness R/L knee; Tenderness L gastroc muscle belly, soleus and midline along venous path, as well as med/lat ankle  Knee AROM  R knee flexion: (140°): 125°  L knee flexion: (140°): 104°  R knee extension: (0°): 2° (from full extension)  L knee extension: (0°): 8° (from full extension)  Knee PROM  L knee flexion: (140°): 106°  Knee MMT  R knee flexion: (5/5): 5/5  L knee flexion: (5/5): 4/5  R knee extension: (5/5): 5/5  L knee extension: (5/5): 4/5  Specific Lower Extremity MMT 5/5 Bilateral unless noted below  R Iliopsoas: (5/5): 4+/5  L Iliopsoas: (5/5): 4+/5  R Gluteals (sidelying): (5/5): 4+/5  L Gluteals (sidelying): (5/5): 4+/5  Special Tests  Other: SLS, EO, Firm: L 10 secs, R 25 sec; Squat: Decreased hip and knee flexion noted with weight shift R  Gait  Gait Comment: Ambulation with SP cane with decreased stance time L LE and increased swing time R LE            Outcome Measures:  Other Measures  Lower Extremity Funtional Score (LEFS): 29/80 (36.25%) (Left)     Treatment Performed:  Therapeutic Exercise  Therapeutic Exercise Activity 1: SciFit (Seat 13), L4, 5 mins  Therapeutic Exercise Activity 2: Dynamics: High knees, Butt kicks, tin soldiers, hip openers, hip closers, x 40 feet each  Therapeutic Exercise Activity 3: HEP Review    Manual Therapy  Manual Therapy Activity 1: Patellofemoral Mobilization " sup/inf glide: Grade III, in supine           Outpatient Education  Individual(s) Educated: Patient  Education Provided: Home Exercise Program  Patient/Caregiver Demonstrated Understanding: yes  Education Comment: Review of HEP from home PT    Assessment:   Patient is 63 y.o. year old who presents to physical therapy with signs and symptoms consistent with s/p left TKA. Patient has decreased ROM and strength limiting functional mobility and ADLs. Patient would benefit from skilled physical therapy in order to address the stated deficits and return to daily tasks with reduced pain and improved function. Patient has done well with home therapy to this point and should continue to demonstrate progression overall with improved tolerance to functional tasks for ADL's.    Personal Factors Affecting Care:   None    SINSS:  Severity: Moderate  Irritability: Moderate  Nature: MSK L knee  Stage: Sub-Acute  Stability: Stable and/or uncomplicated characteristics    Rehab Prognosis: Good      Plan:  Treatment/Interventions: Electrical stimulation, Education/ Instruction, Dry needling, Neuromuscular re-education, Self care/ home management, Therapeutic activities, Therapeutic exercises, Manual therapy  PT Plan: Skilled PT  PT Frequency: 2 times per week  Duration: 6 weeks  Onset Date: 03/15/24  Rehab Potential: Good    Goals: Set and discussed today  STG (Expected End 4/24/2024)  1) Patient will improve LEFS score by 9 points in order to perform functional activities at home and in the community.  2) Patient will be able to complete ADLs with pain in knee less than 3/10.  3) Pt will improve knee flexion ROM to 115 degrees to be able to complete ADLs with less difficulty.  4) Patient will be independent with HEP to allow for continued improvement in daily tasks at home and in the community in 3 visits.     LTG (Expected End 5/15/2024)  1) Patient will have 5/5 strength in hip musculature to aid in balance with ambulation on varied  surfaces in community in 8 weeks  2) Patient will be able to perform proper squatting technique in order to reduce compression on knee and prevent increased pain with daily tasks in 8 weeks.  3) Patient will be able to perform >30 seconds of SLS on even ground in order to allow for safe ambulation on all levels and to reduce fall risk within the community in 8 weeks.   4) Patient will improve LEFS score >/=70/80 points in order to perform functional activities at home and in the community by discharge.    Plan of care was developed with input and agreement by the patient        Osei Patton PT      This note was dictated with voice recognition software. It has not been proofread for grammatical errors, typographical mistakes or other semantic inconsistencies.

## 2024-04-08 ENCOUNTER — TREATMENT (OUTPATIENT)
Dept: PHYSICAL THERAPY | Facility: CLINIC | Age: 64
End: 2024-04-08
Payer: MEDICARE

## 2024-04-08 DIAGNOSIS — M17.11 PRIMARY LOCALIZED OSTEOARTHROSIS OF RIGHT LOWER LEG: ICD-10-CM

## 2024-04-08 PROCEDURE — 97140 MANUAL THERAPY 1/> REGIONS: CPT | Mod: GP | Performed by: PHYSICAL THERAPIST

## 2024-04-08 PROCEDURE — 97112 NEUROMUSCULAR REEDUCATION: CPT | Mod: GP | Performed by: PHYSICAL THERAPIST

## 2024-04-08 PROCEDURE — 97110 THERAPEUTIC EXERCISES: CPT | Mod: GP | Performed by: PHYSICAL THERAPIST

## 2024-04-08 PROCEDURE — 97016 VASOPNEUMATIC DEVICE THERAPY: CPT | Mod: GP | Performed by: PHYSICAL THERAPIST

## 2024-04-08 ASSESSMENT — PAIN - FUNCTIONAL ASSESSMENT: PAIN_FUNCTIONAL_ASSESSMENT: 0-10

## 2024-04-08 ASSESSMENT — PAIN SCALES - GENERAL: PAINLEVEL_OUTOF10: 5 - MODERATE PAIN

## 2024-04-08 NOTE — PROGRESS NOTES
"  Physical Therapy Treatment    Patient Name: Leonel Ceballos  MRN: [id number]  Today's Date: 4/8/2024  Time Calculation  Start Time: 0915  Stop Time: 1010  Time Calculation (min): 55 min  PT Therapeutic Procedures Time Entry  Manual Therapy Time Entry: 10  Neuromuscular Re-Education Time Entry: 8  Therapeutic Exercise Time Entry: 19  Therapeutic Activity Time Entry: 5  PT Modalities Time Entry  Vasopneumatic Devices Time Entry: 10    Current Problem  1. Primary localized osteoarthrosis of right lower leg  Follow Up In Physical Therapy          Insurance:  Number of Treatments Authorized: 2 of Med [facility]  Certification Period Start Date: 04/03/24     Payor: [employer] MEDICARE / Plan: [employer] MEDICARE / Product Type: *No Product type* /     Subjective   General  Reason for Referral: s/p L TKA (DOS: 3/14/2024)  Referred By: Dr. Gabriele Pino  Past Medical History Relevant to Rehab: R TKA  General Comment: Patient states that his knee is feeling \"okay\" today.    Performing HEP?: Yes    Precautions  Precautions  STEADI Fall Risk Score (The score of 4 or more indicates an increased risk of falling): 2  Precautions Comment: Minimal  Pain  Pain Assessment: 0-10  Pain Score: 5 - Moderate pain  Pain Location: Knee (#1 (C,V): L knee diffusely, 1-8/10, \"aching/dull\")       Objective   KNEE    Knee AROM  L knee flexion: (140°): 115°  L knee extension: (0°): 6° (from full extension)    Treatments:    Therapeutic Exercise  Therapeutic Exercise Activity 1: SciFit (Seat 13), L4, 5 mins  Therapeutic Exercise Activity 2: Dynamics: High knees, Butt kicks, tin soldiers, hip openers, hip closers, x 40 feet each  Therapeutic Exercise Activity 3: Soleus Stretch on slantboard, 3x30 secs  Therapeutic Exercise Activity 4: PROM L knee flex/ext  Therapeutic Exercise Activity 5: R/L lateral steps with yellow loop x 2 laps (40 feet each direction = 1 lap)  Therapeutic Exercise Activity 6: F/B diagonal steps with yellow loop x 2 " laps (40 feet  each direction = 1 lap)    Balance/Neuromuscular Re-Education  Balance/Neuromuscular Re-Education Activity 1: Tiltboard balance M/L 5x30 secs  Balance/Neuromuscular Re-Education Activity 2: Tiltboard Rocking M/L, 3 mins    Manual Therapy  Manual Therapy Activity 1: Patellofemoral Mobilization sup/inf glide: Grade III, in supine    Therapeutic Activity  Therapeutic Activity 1: Step ups forward L, 6 inch, 2 x 10  Therapeutic Activity 2: Step ups lateral L, 6 inch, 2 x 10    Modalities  Modality 1: Untimed Vasopneumatic (Gameready L knee 34 degrees; moderate compression x10')      Assessment:  PT Assessment  Assessment Comment: Patient with good tolerance to treatment today but increased soreness noted throughout the session. Patient's ROM and strength continues to improve but overall continues to be limited by soreness in the L knee and posterior lower leg. Instructed patient to communicate lower leg pain with MD and discuss if he needs a f/u with him.    Plan:     PT Plan: Skilled PT (Progress plan of care to include strengthening, range of motion and tolerance to functional tasks.  Will continue to monitor left posterior lower leg pain.)        Onset Date: 03/15/24       Goals:       Osei Patton, PT    This note was dictated with voice recognition software. It has not been proofread for grammatical errors, typographical mistakes or other semantic inconsistencies.

## 2024-04-10 ENCOUNTER — OFFICE VISIT (OUTPATIENT)
Dept: OTOLARYNGOLOGY | Facility: CLINIC | Age: 64
End: 2024-04-10
Payer: MEDICARE

## 2024-04-10 VITALS — WEIGHT: 203 LBS | TEMPERATURE: 97.8 F | BODY MASS INDEX: 30.07 KG/M2 | HEIGHT: 69 IN

## 2024-04-10 DIAGNOSIS — H93.13 TINNITUS OF BOTH EARS: ICD-10-CM

## 2024-04-10 DIAGNOSIS — H90.3 ASYMMETRIC SENSORINEURAL DEAFNESS: Primary | ICD-10-CM

## 2024-04-10 PROCEDURE — 1160F RVW MEDS BY RX/DR IN RCRD: CPT | Performed by: OTOLARYNGOLOGY

## 2024-04-10 PROCEDURE — 1036F TOBACCO NON-USER: CPT | Performed by: OTOLARYNGOLOGY

## 2024-04-10 PROCEDURE — 1157F ADVNC CARE PLAN IN RCRD: CPT | Performed by: OTOLARYNGOLOGY

## 2024-04-10 PROCEDURE — 1159F MED LIST DOCD IN RCRD: CPT | Performed by: OTOLARYNGOLOGY

## 2024-04-10 PROCEDURE — 99213 OFFICE O/P EST LOW 20 MIN: CPT | Performed by: OTOLARYNGOLOGY
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
## 2024-04-11 ENCOUNTER — TREATMENT (OUTPATIENT)
Dept: PHYSICAL THERAPY | Facility: CLINIC | Age: 64
End: 2024-04-11
Payer: MEDICARE

## 2024-04-11 DIAGNOSIS — M17.11 PRIMARY LOCALIZED OSTEOARTHROSIS OF RIGHT LOWER LEG: Primary | ICD-10-CM

## 2024-04-11 DIAGNOSIS — G89.29 CHRONIC PAIN OF LEFT KNEE: ICD-10-CM

## 2024-04-11 DIAGNOSIS — M25.562 CHRONIC PAIN OF LEFT KNEE: ICD-10-CM

## 2024-04-11 PROCEDURE — 97016 VASOPNEUMATIC DEVICE THERAPY: CPT | Mod: GP,CQ

## 2024-04-11 PROCEDURE — 97140 MANUAL THERAPY 1/> REGIONS: CPT | Mod: GP,CQ

## 2024-04-11 PROCEDURE — 97110 THERAPEUTIC EXERCISES: CPT | Mod: GP,CQ

## 2024-04-11 ASSESSMENT — PAIN - FUNCTIONAL ASSESSMENT: PAIN_FUNCTIONAL_ASSESSMENT: 0-10

## 2024-04-11 ASSESSMENT — PAIN SCALES - GENERAL: PAINLEVEL_OUTOF10: 6

## 2024-04-11 NOTE — PROGRESS NOTES
Physical Therapy Treatment    Patient Name: Leonel Ceballos  MRN: [id number]  Today's Date: 4/11/2024  Time Calculation  Start Time: 1122  Stop Time: 1202  Time Calculation (min): 40 min  PT Therapeutic Procedures Time Entry  Manual Therapy Time Entry: 13  Therapeutic Exercise Time Entry: 17, PT Modalities Time Entry  Vasopneumatic Devices Time Entry: 10    Current Problem  1. Primary localized osteoarthrosis of right lower leg              Insurance:  Payor: [employer] / Plan: [employer] / Product Type: *No Product type* /   Number of Treatments Authorized: 3 of Med [facility]  Certification Period Start Date: 04/03/24       Subjective   General  Reason for Referral: s/p L TKA (DOS: 3/14/2024)  Referred By: Dr. Gabriele Pino  Past Medical History Relevant to Rehab: R TKA (reviewed medical history)  General Comment: PT STATES HE'S HURTING A LOT TODAY.  PAIN IS MAINLY IN HIS L KNEE WITH MINIMAL PAIN IN HIS L CALF.  NOT SLEEPING WELL STILL.    Performing HEP?: Yes    Precautions  Precautions  Precautions Comment: Minimal  Pain  Pain Assessment: 0-10  Pain Score: 6  Pain Location: Knee  Pain Orientation: Left    Objective   General Observation  General Observation: PT AMB WITH CANE INTO DEPT. DECREASED L TKE (L KNEE EXT 5* AFTER MANUAL)       Treatments:    Therapeutic Exercise  Therapeutic Exercise Activity 1: SciFit MAN L2 X 7 MIN  Therapeutic Exercise Activity 2: GASTROC / SOLEUS STRETCH X 1 MIN EACH  Therapeutic Exercise Activity 3: SEATED SELF KNEE EXT MOBS X 2 MIN  Therapeutic Exercise Activity 4: TG L7 SQUATS X 2 MIN  Therapeutic Exercise Activity 5: SLR X 1 MIN  Therapeutic Exercise Activity 6: SAQ X 2 MIN    Balance/Neuromuscular Re-Education  Balance/Neuromuscular Re-Education Activity Performed: No    Manual Therapy  Manual Therapy Activity 1: L PF MOBS GRADE 2,3  Manual Therapy Activity 2: STM L KNEE, QUAD, IT BAND  Manual Therapy Activity 3: PROM L KNEE EXT, FLEX          Modalities  Modality 1: Untimed Vasopneumatic (Gameready L knee 34 degrees; moderate compression x10')         OP EDUCATION:  Outpatient Education  Education Comment: CONTINUE WITH CURRENT HEP    Assessment:  PT Assessment  Assessment Comment: PT CINTHIA EX'S WELL.  HE CONTINUES TO IMPROVE WITH HIS ROM.  NOTED TIGHTNESS IN HIS L QUAD, KNEE.  HE CONTINUES TO DISPLAY SWELLING FROM HIS L KNEE TO HIS ANKLE.  PT IS PROGRESSING TOWARDS GOALS.    Plan:  OP PT Plan  PT Plan: Skilled PT (Progress plan of care to include strengthening, range of motion and tolerance to functional tasks.  Will continue to monitor left posterior lower leg pain.)  Onset Date: 03/15/24  Certification Period Start Date: 04/03/24  Number of Treatments Authorized: 3 of [facility]    Goals:  Active       PT Problem       STG       Start:  04/03/24    Expected End:  04/24/24       1) Patient will improve LEFS score by 9 points in order to perform functional activities at home and in the community.  2) Patient will be able to complete ADLs with pain in knee less than 3/10.  3) Pt will improve knee flexion ROM to 115 degrees to be able to complete ADLs with less difficulty.  4) Patient will be independent with HEP to allow for continued improvement in daily tasks at home and in the community in 3 visits.          LTG       Start:  04/03/24    Expected End:  05/15/24       1) Patient will have 5/5 strength in hip musculature to aid in balance with ambulation on varied surfaces in community in 8 weeks  2) Patient will be able to perform proper squatting technique in order to reduce compression on knee and prevent increased pain with daily tasks in 8 weeks.  3) Patient will be able to perform >30 seconds of SLS on even ground in order to allow for safe ambulation on all levels and to reduce fall risk within the community in 8 weeks.   4) Patient will improve LEFS score >/=70/80 points in order to perform functional activities at home and in the community by  discharge.           Resolved       PT Problem       STG (Adequate for Discharge)       Start:  10/02/23    Expected End:  10/30/23    Resolved:  04/03/24    1) Patient will improve LEFS score by 9 points in order to perform functional activities at home and in the community.  2) Patient will be able to complete ADLs with pain in knee less than 4/10. (GOAL MET)  3) Pt will improve knee flexion ROM to 120 degrees to be able to complete ADLs with less difficulty. (GOAL MET)  4) Patient will be independent with HEP to allow for continued improvement in daily tasks at home and in the community in 3 visits.  (GOAL MET)               LTG (Adequate for Discharge)       Start:  10/02/23    Expected End:  11/27/23    Resolved:  04/03/24    1) Patient will have 5/5 strength in hip musculature to aid in balance with ambulation on varied surfaces in community in 8 weeks (GOAL PARTIALLY MET)  2) Patient will be able to perform proper squatting technique in order to reduce compression on knee and prevent increased pain with daily tasks in 8 weeks. (GOAL NOT TESTED)  3) Patient will be able to perform >30 seconds of SLS on even ground in order to allow for safe ambulation on all levels and to reduce fall risk within the community in 8 weeks. (GOAL NOT TESTED)  4) Patient will improve LEFS score >/=70/80 points in order to perform functional activities at home and in the community by discharge. (GOAL PARTIALLY MET)                Abdi Johnson, PTA

## 2024-04-15 ENCOUNTER — TREATMENT (OUTPATIENT)
Dept: PHYSICAL THERAPY | Facility: CLINIC | Age: 64
End: 2024-04-15
Payer: MEDICARE

## 2024-04-15 DIAGNOSIS — M25.562 CHRONIC PAIN OF LEFT KNEE: Primary | ICD-10-CM

## 2024-04-15 DIAGNOSIS — G89.29 CHRONIC PAIN OF LEFT KNEE: Primary | ICD-10-CM

## 2024-04-15 PROCEDURE — 97140 MANUAL THERAPY 1/> REGIONS: CPT | Mod: GP | Performed by: PHYSICAL THERAPIST

## 2024-04-15 PROCEDURE — 97112 NEUROMUSCULAR REEDUCATION: CPT | Mod: GP | Performed by: PHYSICAL THERAPIST

## 2024-04-15 PROCEDURE — 97110 THERAPEUTIC EXERCISES: CPT | Mod: GP | Performed by: PHYSICAL THERAPIST

## 2024-04-15 ASSESSMENT — PAIN SCALES - GENERAL: PAINLEVEL_OUTOF10: 6

## 2024-04-15 ASSESSMENT — PAIN - FUNCTIONAL ASSESSMENT: PAIN_FUNCTIONAL_ASSESSMENT: 0-10

## 2024-04-15 NOTE — PROGRESS NOTES
Physical Therapy Treatment    Patient Name: Leonel Ceballos  MRN: [id number]  Today's Date: 4/15/2024  Time Calculation  Start Time: 1005  Stop Time: 1050  Time Calculation (min): 45 min  PT Therapeutic Procedures Time Entry  Manual Therapy Time Entry: 13  Neuromuscular Re-Education Time Entry: 8  Therapeutic Exercise Time Entry: 20       Current Problem  No diagnosis found.    Insurance:  Number of Treatments Authorized: 4 of [facility]  Certification Period Start Date: 04/03/24     Payor: [employer] MEDICARE / Plan: [employer] MEDICARE / Product Type: *No Product type* /     Subjective   General  Reason for Referral: s/p L TKA (DOS: 3/14/2024)  Referred By: Dr. Gabriele Pino  Past Medical History Relevant to Rehab: R TKA (reviewed medical history)  General Comment: Patient reports that his knee is feeling okay but continues to be limited by pain in the posterior L lower leg.    Performing HEP?: Yes    Precautions  Precautions  STEADI Fall Risk Score (The score of 4 or more indicates an increased risk of falling): 2  Precautions Comment: Minimal  Pain  Pain Assessment: 0-10  Pain Score: 6  Pain Location: Leg (L lower lateral leg)       Objective   KNEE      Knee AROM  L knee flexion: (140°): 115°  L knee extension: (0°): 6° (from full extension)    Gait  Gait Comment: Ambulation with SP cane with decreased stance time L LE and increased swing time R LE    Treatments:    Therapeutic Exercise  Therapeutic Exercise Activity 1: SciFit (Seat 13), L4, 5 mins  Therapeutic Exercise Activity 2: Soleus Stretch on slantboard, 3x30 secs  Therapeutic Exercise Activity 3: PROM L knee flex/ext  Therapeutic Exercise Activity 4: [employer] Gym (L7) Squats DL, 2x15  Therapeutic Exercise Activity 5: R/L lateral steps with yellow loop x 2 laps (40 feet each direction = 1 lap)  Therapeutic Exercise Activity 6: F/B diagonal steps with yellow loop x 2 laps (40 feet  each direction = 1 lap)  Therapeutic Exercise Activity 7: DL Leg  Press, 100 lbs, 3x10  Therapeutic Exercise Activity 8: SL Leg Press L, 45 lbs, 3x10    Balance/Neuromuscular Re-Education  Balance/Neuromuscular Re-Education Activity 1: Tiltboard balance M/L 5x30 secs  Balance/Neuromuscular Re-Education Activity 2: SLS on AirEx L, 15 sec x10    Manual Therapy  Manual Therapy Activity 1: Patellofemoral Mobilization sup/inf glide: Grade III, in supine    OP EDUCATION:  Outpatient Education  Education Comment: CONTINUE WITH CURRENT HEP    Assessment:  PT Assessment  Assessment Comment: Patient with good tolerance to treatment throughout the session. He had no increased symptoms until doing SLS on unstable surface which caused increased L lower leg pain. Discussed with patient about contacting MD and discussing lower leg pain.    Plan:     PT Plan: Skilled PT (Progress plan of care to include strengthening, range of motion and tolerance to functional tasks.  Will continue to monitor left posterior lower leg pain.)        Onset Date: 03/15/24       Goals:       Osei Patton, PT    This note was dictated with voice recognition software. It has not been proofread for grammatical errors, typographical mistakes or other semantic inconsistencies.

## 2024-04-16 DIAGNOSIS — Z96.652 HISTORY OF TOTAL KNEE ARTHROPLASTY, LEFT: ICD-10-CM

## 2024-04-16 DIAGNOSIS — M79.662 PAIN OF LEFT CALF: Primary | ICD-10-CM

## 2024-04-18 ENCOUNTER — TREATMENT (OUTPATIENT)
Dept: PHYSICAL THERAPY | Facility: CLINIC | Age: 64
End: 2024-04-18
Payer: MEDICARE

## 2024-04-18 DIAGNOSIS — M25.562 CHRONIC PAIN OF LEFT KNEE: Primary | ICD-10-CM

## 2024-04-18 DIAGNOSIS — G89.29 CHRONIC PAIN OF LEFT KNEE: Primary | ICD-10-CM

## 2024-04-18 PROCEDURE — 97140 MANUAL THERAPY 1/> REGIONS: CPT | Mod: GP | Performed by: PHYSICAL THERAPIST

## 2024-04-18 PROCEDURE — 97110 THERAPEUTIC EXERCISES: CPT | Mod: GP | Performed by: PHYSICAL THERAPIST

## 2024-04-18 PROCEDURE — 97112 NEUROMUSCULAR REEDUCATION: CPT | Mod: GP | Performed by: PHYSICAL THERAPIST

## 2024-04-18 ASSESSMENT — PAIN SCALES - GENERAL: PAINLEVEL_OUTOF10: 4

## 2024-04-18 ASSESSMENT — PAIN - FUNCTIONAL ASSESSMENT: PAIN_FUNCTIONAL_ASSESSMENT: 0-10

## 2024-04-18 NOTE — PROGRESS NOTES
Physical Therapy Treatment    Patient Name: Leonel Ceballos  MRN: [id number]  Today's Date: 4/18/2024  Time Calculation  Start Time: 0948  Stop Time: 1035  Time Calculation (min): 47 min  PT Therapeutic Procedures Time Entry  Manual Therapy Time Entry: 10  Neuromuscular Re-Education Time Entry: 10  Therapeutic Exercise Time Entry: 20       Current Problem  No diagnosis found.    Insurance:  Number of Treatments Authorized: 5 of Med [facility]  Certification Period Start Date: 04/03/24     Payor: [employer] MEDICARE / Plan: [employer] MEDICARE / Product Type: *No Product type* /     Subjective   General  Reason for Referral: s/p L TKA (DOS: 3/14/2024)  Referred By: Dr. Gabriele Pino  Past Medical History Relevant to Rehab: R TKA (reviewed medical history)  General Comment: Patient is feeling good today with regards to the knee.  He does note that he continues to have left lateral lower leg pain which can worsen with weightbearing activity.    Performing HEP?: Yes    Precautions  Precautions  STEADI Fall Risk Score (The score of 4 or more indicates an increased risk of falling): 2  Precautions Comment: Minimal  Pain  Pain Assessment: 0-10  Pain Score: 4  Pain Location: Leg (L lower lateral leg)       Objective   KNEE    Knee AROM  L knee flexion: (140°): 120°  L knee extension: (0°): 6° (from full extension)    Treatments:    Therapeutic Exercise  Therapeutic Exercise Activity 1: SciFit (Seat 13), L4, 5 mins  Therapeutic Exercise Activity 2: Soleus Stretch on slantboard, 3x30 secs  Therapeutic Exercise Activity 3: PROM L knee flex/ext  Therapeutic Exercise Activity 4: Dynamics: High knees, Butt kicks, tin soldiers x 40 feet each  Therapeutic Exercise Activity 5: R/L lateral steps with yellow loop x 2 laps (40 feet each direction = 1 lap)  Therapeutic Exercise Activity 6: F/B diagonal steps with yellow loop x 2 laps (40 feet  each direction = 1 lap)  Therapeutic Exercise Activity 7: SL Leg Press L, 45 lbs,  3x10  Therapeutic Exercise Activity 8: SL RDL L, 10 lb KB, 2x10    Balance/Neuromuscular Re-Education  Balance/Neuromuscular Re-Education Activity 1: Tiltboard balance M/L 5x30 secs  Balance/Neuromuscular Re-Education Activity 2: SLS on AirEx L, 15 sec x10    Manual Therapy  Manual Therapy Activity 1: Patellofemoral Mobilization sup/inf glide: Grade III, in supine    OP EDUCATION:  Outpatient Education  Education Comment: CONTINUE WITH CURRENT HEP    Assessment:  PT Assessment  Assessment Comment: Patient able to complete exercises today with minimal complaints of increased left lateral lower leg pain.  Symptoms subsided returned to baseline immediately upon stopping exercises that aggravated it.  Patient to have Doppler ultrasound of the lower leg tomorrow to assess for any potential situations that could be contributing to symptoms.  Will continue to progress as able based on test results and patient presentation.    Plan:     PT Plan: Skilled PT (Progress plan of care to include strengthening, range of motion and tolerance to functional tasks.  Will continue to monitor left posterior lower leg pain.)        Onset Date: 03/15/24       Goals:       Osei Patton, PT    This note was dictated with voice recognition software. It has not been proofread for grammatical errors, typographical mistakes or other semantic inconsistencies.

## 2024-04-19 ENCOUNTER — HOSPITAL ENCOUNTER (OUTPATIENT)
Dept: VASCULAR MEDICINE | Facility: CLINIC | Age: 64
Discharge: HOME | End: 2024-04-19
Payer: MEDICARE

## 2024-04-19 DIAGNOSIS — M79.662 PAIN OF LEFT CALF: ICD-10-CM

## 2024-04-19 DIAGNOSIS — Z96.652 HISTORY OF TOTAL KNEE ARTHROPLASTY, LEFT: ICD-10-CM

## 2024-04-19 PROCEDURE — 93971 EXTREMITY STUDY: CPT

## 2024-04-19 PROCEDURE — 93971 EXTREMITY STUDY: CPT | Performed by: INTERNAL MEDICINE

## 2024-04-22 ENCOUNTER — TREATMENT (OUTPATIENT)
Dept: PHYSICAL THERAPY | Facility: CLINIC | Age: 64
End: 2024-04-22
Payer: MEDICARE

## 2024-04-22 DIAGNOSIS — G89.29 CHRONIC PAIN OF LEFT KNEE: ICD-10-CM

## 2024-04-22 DIAGNOSIS — M25.562 CHRONIC PAIN OF LEFT KNEE: ICD-10-CM

## 2024-04-22 PROCEDURE — 97112 NEUROMUSCULAR REEDUCATION: CPT | Mod: GP | Performed by: PHYSICAL THERAPIST

## 2024-04-22 PROCEDURE — 97140 MANUAL THERAPY 1/> REGIONS: CPT | Mod: GP | Performed by: PHYSICAL THERAPIST

## 2024-04-22 PROCEDURE — 97110 THERAPEUTIC EXERCISES: CPT | Mod: GP | Performed by: PHYSICAL THERAPIST

## 2024-04-22 ASSESSMENT — PAIN SCALES - GENERAL: PAINLEVEL_OUTOF10: 2

## 2024-04-22 ASSESSMENT — PAIN - FUNCTIONAL ASSESSMENT: PAIN_FUNCTIONAL_ASSESSMENT: 0-10

## 2024-04-22 NOTE — PROGRESS NOTES
Physical Therapy Treatment    Patient Name: Leonel Ceballos  MRN: [id number]  Today's Date: 4/22/2024  Time Calculation  Start Time: 1000  Stop Time: 1050  Time Calculation (min): 50 min  PT Therapeutic Procedures Time Entry  Manual Therapy Time Entry: 10  Neuromuscular Re-Education Time Entry: 9  Therapeutic Exercise Time Entry: 22  Therapeutic Activity Time Entry: 5       Current Problem  1. Chronic pain of left knee  Follow Up In Physical Therapy          Insurance:  Number of Treatments Authorized: 5 of Med [facility]  Certification Period Start Date: 04/03/24     Payor: [employer] MEDICARE / Plan: [employer] MEDICARE / Product Type: *No Product type* /     Subjective   General  Reason for Referral: s/p L TKA (DOS: 3/14/2024)  Referred By: Dr. Gabriele Pino  Past Medical History Relevant to Rehab: R TKA (reviewed medical history)  General Comment: Patient reports that overall in left knee continues to feel good with slight symptoms and soreness.  He notes his main limiting factor continues to be the left lateral lower leg.  MD reported negative ultrasound for DVT.    Performing HEP?: Yes    Precautions  Precautions  STEADI Fall Risk Score (The score of 4 or more indicates an increased risk of falling): 2  Precautions Comment: Minimal  Pain  Pain Assessment: 0-10  Pain Score: 2  Pain Location: Knee       Objective   KNEE    Knee AROM  L knee flexion: (140°): 120°  L knee extension: (0°): 6° (from full extension)    Treatments:    Therapeutic Exercise  Therapeutic Exercise Activity 1: SciFit (Seat 13), L4, 5 mins  Therapeutic Exercise Activity 2: Soleus Stretch on slantboard, 3x30 secs  Therapeutic Exercise Activity 3: PROM L knee flex/ext  Therapeutic Exercise Activity 4: Dynamics: High knees, Butt kicks, tin soldiers x 40 feet each  Therapeutic Exercise Activity 5: R/L lateral steps with yellow loop x 2 laps (40 feet each direction = 1 lap)  Therapeutic Exercise Activity 6: F/B diagonal steps with yellow  loop x 2 laps (40 feet  each direction = 1 lap)  Therapeutic Exercise Activity 7: SL Leg Press L, 45 lbs, 3x10  Therapeutic Exercise Activity 8: SL RDL L, 10 lb KB, 2x10    Balance/Neuromuscular Re-Education  Balance/Neuromuscular Re-Education Activity 1: Tiltboard balance M/L 5x30 secs  Balance/Neuromuscular Re-Education Activity 2: SLS on AirEx L, 15 sec x10    Manual Therapy  Manual Therapy Activity 1: Patellofemoral Mobilization sup/inf glide: Grade III, in supine    Therapeutic Activity  Therapeutic Activity 1: Anterior Step Down, 4 inch 2x10              OP EDUCATION:  Outpatient Education  Education Comment: CONTINUE WITH CURRENT HEP    Assessment:  PT Assessment  Assessment Comment: Patient tolerated treatment well today with challenges noted during single limb stance exercises with impact on balance but patient able to self-correct.  Patient also challenged with single limb strength doing exercises particularly those with an eccentric component evidenced by decreased pace and subjective reports of fatigue following.  Will continue to focus on progressing strength of the lower extremity, balance and possibly look to introduce dry needling/STM to the left lateral lower leg if appropriate.    Plan:     PT Plan: Skilled PT (Progress plan of care to include strengthening, range of motion and tolerance to functional tasks.  Will continue to monitor left posterior lower leg pain.)        Onset Date: 03/15/24       Goals:       Osei Patton, PT    This note was dictated with voice recognition software. It has not been proofread for grammatical errors, typographical mistakes or other semantic inconsistencies.

## 2024-04-25 ENCOUNTER — TREATMENT (OUTPATIENT)
Dept: PHYSICAL THERAPY | Facility: CLINIC | Age: 64
End: 2024-04-25
Payer: MEDICARE

## 2024-04-25 DIAGNOSIS — M25.562 CHRONIC PAIN OF LEFT KNEE: ICD-10-CM

## 2024-04-25 DIAGNOSIS — G89.29 CHRONIC PAIN OF LEFT KNEE: ICD-10-CM

## 2024-04-25 PROCEDURE — 97110 THERAPEUTIC EXERCISES: CPT | Mod: GP,CQ

## 2024-04-25 PROCEDURE — 97140 MANUAL THERAPY 1/> REGIONS: CPT | Mod: GP,CQ

## 2024-04-25 ASSESSMENT — PAIN SCALES - GENERAL: PAINLEVEL_OUTOF10: 3

## 2024-04-25 ASSESSMENT — PAIN - FUNCTIONAL ASSESSMENT: PAIN_FUNCTIONAL_ASSESSMENT: 0-10

## 2024-04-25 NOTE — PROGRESS NOTES
"  Physical Therapy Treatment    Patient Name: Leonel Ceballos  MRN: [id number]  Today's Date: 4/25/2024  Time Calculation  Start Time: 0953  Stop Time: 1037  Time Calculation (min): 44 min  PT Therapeutic Procedures Time Entry  Manual Therapy Time Entry: 8  Neuromuscular Re-Education Time Entry: 6  Therapeutic Exercise Time Entry: 30,      Current Problem  1. Chronic pain of left knee  Follow Up In Physical Therapy            Insurance:  Payor: [employer] MEDICARE / Plan: [employer] MEDICARE / Product Type: *No Product type* /   Number of Treatments Authorized: 7 of Med [facility]  Certification Period Start Date: 04/03/24       Subjective   General  Reason for Referral: s/p L TKA (DOS: 3/14/2024)  Referred By: Dr. Gabriele Pino  Past Medical History Relevant to Rehab: R TKA (reviewed medical history)  General Comment: PT STATES HIS KNEE IS SORE AND PUFFY TODAY.    Performing HEP?: Yes    Precautions  Precautions  Precautions Comment: Minimal  Pain  Pain Assessment: 0-10  Pain Score: 3  Pain Location: Knee  Pain Orientation: Left    Objective   General Observation  General Observation: PT AMB WITH CANE AND DECREASED TKE       Treatments:    Therapeutic Exercise  Therapeutic Exercise Activity 1: SciFit MAN L3 x 6 MIN  Therapeutic Exercise Activity 2: GASTROC/SOLEUS STRETCH 3 X 30\"  Therapeutic Exercise Activity 3: HEEL RAISES X 1 MIN  Therapeutic Exercise Activity 4: Dynamics: High knees, Butt kicks, tin soldiers, HIP FLEX  Therapeutic Exercise Activity 5: FOOTWORM GREEN LOOP 2 X 40', ZIG ZAG - MONSTER F/B X 40' EACH  Therapeutic Exercise Activity 6: SCOOTER 4 X 40'  Therapeutic Exercise Activity 7: LEG PRESS L 50# X 20, 60# X 15  Therapeutic Exercise Activity 8: SEATED SELF KNEE EXT MOBS X 2 MIN  Therapeutic Exercise Activity 9: SBALL BRIDGE HOLD 10\" X 2 MIN    Balance/Neuromuscular Re-Education  Balance/Neuromuscular Re-Education Activity 1: TILT BOARD BALANCING X 2 MIN  Balance/Neuromuscular Re-Education " "Activity 2: AIREX SLS L/R X 1 MIN EACH  Balance/Neuromuscular Re-Education Activity 3: 6\" FWD STEP UPS 2 X 1 MIN    Manual Therapy  Manual Therapy Activity 1: PROM L KNEE EXT  Manual Therapy Activity 2: L PF MOB                   OP EDUCATION:  Outpatient Education  Education Comment: PT INSTRUCTED TO WORK ON KNEE EXT AT LEAST 3X/DAY FOR 10 MIN EACH TIME    Assessment:  PT Assessment  Assessment Comment: PT CINTHIA EX'S WELL.  HE IS SLOWLY PROGRESSING WITH HIS STRENGTH AND BALANCE.  HE IS STILL LACKING KNEE EXT END ROM. NOTED MIN L KNEE SWELLING.    Plan:  OP PT Plan  PT Plan: Skilled PT (Progress plan of care to include strengthening, range of motion and tolerance to functional tasks.  Will continue to monitor left posterior lower leg pain.)  Onset Date: 03/15/24  Certification Period Start Date: 04/03/24  Number of Treatments Authorized: 7 of Med [facility]    Goals:  Active       PT Problem       STG       Start:  04/03/24    Expected End:  04/24/24       1) Patient will improve LEFS score by 9 points in order to perform functional activities at home and in the community.  2) Patient will be able to complete ADLs with pain in knee less than 3/10.  3) Pt will improve knee flexion ROM to 115 degrees to be able to complete ADLs with less difficulty.  4) Patient will be independent with HEP to allow for continued improvement in daily tasks at home and in the community in 3 visits.          LTG       Start:  04/03/24    Expected End:  05/15/24       1) Patient will have 5/5 strength in hip musculature to aid in balance with ambulation on varied surfaces in community in 8 weeks  2) Patient will be able to perform proper squatting technique in order to reduce compression on knee and prevent increased pain with daily tasks in 8 weeks.  3) Patient will be able to perform >30 seconds of SLS on even ground in order to allow for safe ambulation on all levels and to reduce fall risk within the community in 8 weeks.   4) Patient will " improve LEFS score >/=70/80 points in order to perform functional activities at home and in the community by discharge.           Resolved       PT Problem       STG (Adequate for Discharge)       Start:  10/02/23    Expected End:  10/30/23    Resolved:  04/03/24    1) Patient will improve LEFS score by 9 points in order to perform functional activities at home and in the community.  2) Patient will be able to complete ADLs with pain in knee less than 4/10. (GOAL MET)  3) Pt will improve knee flexion ROM to 120 degrees to be able to complete ADLs with less difficulty. (GOAL MET)  4) Patient will be independent with HEP to allow for continued improvement in daily tasks at home and in the community in 3 visits.  (GOAL MET)               LTG (Adequate for Discharge)       Start:  10/02/23    Expected End:  11/27/23    Resolved:  04/03/24    1) Patient will have 5/5 strength in hip musculature to aid in balance with ambulation on varied surfaces in community in 8 weeks (GOAL PARTIALLY MET)  2) Patient will be able to perform proper squatting technique in order to reduce compression on knee and prevent increased pain with daily tasks in 8 weeks. (GOAL NOT TESTED)  3) Patient will be able to perform >30 seconds of SLS on even ground in order to allow for safe ambulation on all levels and to reduce fall risk within the community in 8 weeks. (GOAL NOT TESTED)  4) Patient will improve LEFS score >/=70/80 points in order to perform functional activities at home and in the community by discharge. (GOAL PARTIALLY MET)                Abdi Johnson, PTA

## 2024-05-01 ENCOUNTER — TREATMENT (OUTPATIENT)
Dept: PHYSICAL THERAPY | Facility: CLINIC | Age: 64
End: 2024-05-01
Payer: MEDICARE

## 2024-05-01 DIAGNOSIS — M25.562 CHRONIC PAIN OF LEFT KNEE: ICD-10-CM

## 2024-05-01 DIAGNOSIS — G89.29 CHRONIC PAIN OF LEFT KNEE: ICD-10-CM

## 2024-05-01 PROCEDURE — 97110 THERAPEUTIC EXERCISES: CPT | Mod: GP,CQ

## 2024-05-01 PROCEDURE — 97112 NEUROMUSCULAR REEDUCATION: CPT | Mod: GP,CQ

## 2024-05-01 ASSESSMENT — PAIN SCALES - GENERAL: PAINLEVEL_OUTOF10: 2

## 2024-05-01 ASSESSMENT — PAIN - FUNCTIONAL ASSESSMENT: PAIN_FUNCTIONAL_ASSESSMENT: 0-10

## 2024-05-01 NOTE — PROGRESS NOTES
Physical Therapy Treatment    Patient Name: Leonel Ceballos  MRN: [id number]  Today's Date: 5/1/2024  Time Calculation  Start Time: 1523  Stop Time: 1601  Time Calculation (min): 38 min  PT Therapeutic Procedures Time Entry  Neuromuscular Re-Education Time Entry: 8  Therapeutic Exercise Time Entry: 30,      Current Problem  1. Chronic pain of left knee  Follow Up In Physical Therapy            Insurance:  Payor: [employer] MEDICARE / Plan: [employer] MEDICARE / Product Type: *No Product type* /   Number of Treatments Authorized: 8 of Med [facility]  Certification Period Start Date: 04/03/24       Subjective   General  Reason for Referral: s/p L TKA (DOS: 3/14/2024)  Referred By: Dr. Gabriele Pino  Past Medical History Relevant to Rehab: R TKA (reviewed medical history)  General Comment: PT STATES OVERALL THE KNEE FEELS OK.  THE INCISION LOOKS RED AND IS SORE, POSSIBLY INFECTED. PT HAS ANTIBIOTIC CREAM AND BANDAIDS ON IT.    Performing HEP?: Yes    Precautions  Precautions  Precautions Comment: Minimal  Pain  Pain Assessment: 0-10  Pain Score: 2  Pain Location: Knee  Pain Orientation: Left    Objective   General Observation  General Observation: DECREASED L TKE       Treatments:    Therapeutic Exercise  Therapeutic Exercise Activity 1: SciFit HILLS L2 x 6 MIN  Therapeutic Exercise Activity 2: GASTROC STRETCH X 1 MIN  Therapeutic Exercise Activity 3: HEEL RAISES X 1 MIN  Therapeutic Exercise Activity 4: Dynamics: High knees, Butt kicks, tin soldiers, HIP FLEX  Therapeutic Exercise Activity 5: FOOTWORM GREEN LOOP 2 X 40', ZIG ZAG - MONSTER F/B X 40' EACH  Therapeutic Exercise Activity 6: HAM CURLS 50# X 2 MIN  Therapeutic Exercise Activity 7: LEG PRESS L/R 50# X 20, 60# X 15,  70# X 10 EACH  Therapeutic Exercise Activity 8: L TKE BLACK TBAND X 2 MIN    Balance/Neuromuscular Re-Education  Balance/Neuromuscular Re-Education Activity 1: TILT BOARD BALANCING X 2 MIN  Balance/Neuromuscular Re-Education Activity 2:  "AIREX SLS L/R X 1 MIN EACH  Balance/Neuromuscular Re-Education Activity 3: 6\" FWD STEP UPS WITH HIGH KNEE X 2 MIN  Balance/Neuromuscular Re-Education Activity 4: AIREX  BEAM TANDEM STANCE 2 X 1 MIN    Manual Therapy  Manual Therapy Performed: No                   OP EDUCATION:  Outpatient Education  Education Comment: CONTINUE WITH CURRENT HEP    Assessment:  PT Assessment  Assessment Comment: PT CINTHIA EX'S WELL.  HE CONTINUES TO SLOWLY PROGRESS WITH HIS STRENGTH AND BALANCE.  STILL LACKING L TKE.  POSSIBLE INFECTION IN HIS SCAR AREA.    Plan:  OP PT Plan  PT Plan: Skilled PT (Progress plan of care to include strengthening, range of motion and tolerance to functional tasks.  Will continue to monitor left posterior lower leg pain.)  Onset Date: 03/15/24  Certification Period Start Date: 04/03/24  Number of Treatments Authorized: 8 of Med [facility]    Goals:       Abdi Johnson PTA  "

## 2024-05-02 ENCOUNTER — HOSPITAL ENCOUNTER (OUTPATIENT)
Dept: RADIOLOGY | Facility: CLINIC | Age: 64
Discharge: HOME | End: 2024-05-02
Payer: MEDICARE

## 2024-05-02 ENCOUNTER — OFFICE VISIT (OUTPATIENT)
Dept: ORTHOPEDIC SURGERY | Facility: CLINIC | Age: 64
End: 2024-05-02
Payer: MEDICARE

## 2024-05-02 DIAGNOSIS — M25.562 CHRONIC PAIN OF LEFT KNEE: Primary | ICD-10-CM

## 2024-05-02 DIAGNOSIS — M25.562 CHRONIC PAIN OF LEFT KNEE: ICD-10-CM

## 2024-05-02 DIAGNOSIS — G89.29 CHRONIC PAIN OF LEFT KNEE: Primary | ICD-10-CM

## 2024-05-02 DIAGNOSIS — G89.29 CHRONIC PAIN OF LEFT KNEE: ICD-10-CM

## 2024-05-02 PROCEDURE — 1157F ADVNC CARE PLAN IN RCRD: CPT | Performed by: ORTHOPAEDIC SURGERY

## 2024-05-02 PROCEDURE — 73564 X-RAY EXAM KNEE 4 OR MORE: CPT | Mod: LT

## 2024-05-02 PROCEDURE — 99024 POSTOP FOLLOW-UP VISIT: CPT | Performed by: ORTHOPAEDIC SURGERY

## 2024-05-02 PROCEDURE — 73564 X-RAY EXAM KNEE 4 OR MORE: CPT | Mod: LEFT SIDE | Performed by: RADIOLOGY

## 2024-05-02 PROCEDURE — 1160F RVW MEDS BY RX/DR IN RCRD: CPT | Performed by: ORTHOPAEDIC SURGERY

## 2024-05-02 PROCEDURE — 1159F MED LIST DOCD IN RCRD: CPT | Performed by: ORTHOPAEDIC SURGERY

## 2024-05-02 RX ORDER — BACLOFEN 20 MG/1
TABLET ORAL
Qty: 90 TABLET | Refills: 0 | Status: SHIPPED | OUTPATIENT
Start: 2024-05-02 | End: 2024-05-27

## 2024-05-02 RX ORDER — DOXYCYCLINE 100 MG/1
100 CAPSULE ORAL 2 TIMES DAILY
Qty: 28 CAPSULE | Refills: 0 | Status: SHIPPED | OUTPATIENT
Start: 2024-05-02 | End: 2024-05-16

## 2024-05-02 NOTE — PROGRESS NOTES
Chief Complaint   Patient presents with    Left Knee - Post-op     3/14/24 LT TKA            This is a 63 y.o. male who is 6 weeks out from left total knee.  No drainage from his incision no fevers or chills.  He does have small area of swelling at the top of the incision that developed in the last few days.  Progressing well with physical therapy and appropriately improving in function.  He also had some pain in the left side of his calf.    Left knee examination: Surgical incision well healed no erythema no drainage.  There is a small stitch abscess at the very top of the incision, no active drainage but a stitch is poking out.  Stable to varus valgus stress range of motion 0-1 20.  Neurovascular tact distally    X-rays of the knee were reviewed independently interpreted by me today, the show stable total knee arthroplasty no fracture dislocation or loosening    Impression plan: 63 y.o. male 6 weeks out from left total knee.  We discussed continuing physical therapy and home exercise program. Pain medications to be used as needed. Assistive devices as needed per PT. We discussed DVT prophylaxis until 6 weeks. No dentist until 3 months and thereafter dental prophylaxis for life. Activity as tolerated weightbearing as tolerated. I have personally reviewed the OARRS report for the patient. This report is scanned into the electronic medical record. I have considered the risks of abuse, dependence, addiction and diversion. Follow up 3 months with xrays.  Antibiotics for the stitch abscess.  Will keep an eye on that.  Regarding his calf pain, DVT ultrasound was negative will try a muscle relaxant.

## 2024-05-06 ENCOUNTER — TREATMENT (OUTPATIENT)
Dept: PHYSICAL THERAPY | Facility: CLINIC | Age: 64
End: 2024-05-06
Payer: MEDICARE

## 2024-05-06 DIAGNOSIS — G89.29 CHRONIC PAIN OF LEFT KNEE: ICD-10-CM

## 2024-05-06 DIAGNOSIS — M25.562 CHRONIC PAIN OF LEFT KNEE: ICD-10-CM

## 2024-05-06 PROCEDURE — 97112 NEUROMUSCULAR REEDUCATION: CPT | Mod: GP,CQ

## 2024-05-06 PROCEDURE — 97110 THERAPEUTIC EXERCISES: CPT | Mod: GP,CQ

## 2024-05-06 ASSESSMENT — PAIN - FUNCTIONAL ASSESSMENT: PAIN_FUNCTIONAL_ASSESSMENT: 0-10

## 2024-05-06 ASSESSMENT — PAIN SCALES - GENERAL: PAINLEVEL_OUTOF10: 3

## 2024-05-06 NOTE — PROGRESS NOTES
"  Physical Therapy Treatment    Patient Name: Leonel Ceballos  MRN: [id number]  Today's Date: 5/6/2024  Time Calculation  Start Time: 1353  Stop Time: 1435  Time Calculation (min): 42 min  PT Therapeutic Procedures Time Entry  Manual Therapy Time Entry: 6  Neuromuscular Re-Education Time Entry: 6  Therapeutic Exercise Time Entry: 30,      Current Problem  1. Chronic pain of left knee  Follow Up In Physical Therapy            Insurance:  Payor: [employer] MEDICARE / Plan: UNITED HEALTHCARE MEDICARE / Product Type: *No Product type* /   Number of Treatments Authorized: 9 of Med [facility]  Certification Period Start Date: 04/03/24       Subjective   General  Reason for Referral: s/p L TKA (DOS: 3/14/2024)  Referred By: Dr. Gabriele Pino  Past Medical History Relevant to Rehab: R TKA (reviewed medical history)  General Comment: PT STATES HE SAW MD AND GAVE HIM ANTIBIOTICS FOR INFECTION IN THE KNEE AND MUSCLE RELAXER FOR CALF PAIN.    Performing HEP?: Yes    Precautions  Precautions  Precautions Comment: Minimal  Pain  Pain Assessment: 0-10  Pain Score: 3  Pain Location: Knee  Pain Orientation: Left    Objective   General Observation  General Observation: DECREASED L TKE      Treatments:    Therapeutic Exercise  Therapeutic Exercise Activity 1: SciFit HILLS L2 x 6 MIN  Therapeutic Exercise Activity 2: GASTROC STRETCH X 1 MIN  Therapeutic Exercise Activity 3: HEEL RAISES X 1 MIN  Therapeutic Exercise Activity 4: Dynamics: High knees, Butt kicks, tin soldiers, HIP FLEX  Therapeutic Exercise Activity 5: FOOTWORM GREEN LOOP 2 X 40', ZIG ZAG - MONSTER F/B X 40' EACH  Therapeutic Exercise Activity 6: HAM CURLS 70# X 2 MIN  Therapeutic Exercise Activity 7: TG L7 SQUATS X 2 MIN  Therapeutic Exercise Activity 8: BRIDGE HOLD 10\" X 2 MIN  Therapeutic Exercise Activity 9: SAQ L/R 2# X 2 MIN    Balance/Neuromuscular Re-Education  Balance/Neuromuscular Re-Education Activity 1: TILT BOARD BALANCING X 2 MIN  Balance/Neuromuscular " Re-Education Activity 2: AIREX SLS L/R X 1 MIN EACH  Balance/Neuromuscular Re-Education Activity 3: AIREX BEAM TANDEM STANCE 2 X 1 MIN    Manual Therapy  Manual Therapy Performed: Yes  Manual Therapy Activity 1: PROM L KNEE EXT  Manual Therapy Activity 2: L PF MOB                   OP EDUCATION:  Outpatient Education  Education Comment: CONTINUE WITH CURRENT HEP    Assessment:  PT Assessment  Assessment Comment: PT CINTHIA EX'S WELL. HE STILL NEEDS TO WORK ON L KNEE EXT END ROM. OVERALL, PT IS PROGRESSING TOWARDS GOALS.    Plan:  OP PT Plan  PT Plan: Skilled PT (PT GIVEN INFO FOR DRY NEEDLING AND WOULD LIKE IT PERFORMED FOR HIS GASTROC PAIN)  Onset Date: 03/15/24  Certification Period Start Date: 04/03/24  Number of Treatments Authorized: 9 of Med [facility]    Goals:1) Patient will improve LEFS score by 9 points in order to perform functional activities at home and in the community.  2) Patient will be able to complete ADLs with pain in knee less than 3/10.  3) Pt will improve knee flexion ROM to 115 degrees to be able to complete ADLs with less difficulty.  4) Patient will be independent with HEP to allow for continued improvement in daily tasks at home and in the community in 3 visits.      LTG (Expected End 5/15/2024)  1) Patient will have 5/5 strength in hip musculature to aid in balance with ambulation on varied surfaces in community in 8 weeks  2) Patient will be able to perform proper squatting technique in order to reduce compression on knee and prevent increased pain with daily tasks in 8 weeks.  3) Patient will be able to perform >30 seconds of SLS on even ground in order to allow for safe ambulation on all levels and to reduce fall risk within the community in 8 weeks.   4) Patient will improve LEFS score >/=70/80 points in order to perform functional activities at home and in the community by discharge.         Abdi Johnson, PTA

## 2024-05-08 ENCOUNTER — TREATMENT (OUTPATIENT)
Dept: PHYSICAL THERAPY | Facility: CLINIC | Age: 64
End: 2024-05-08
Payer: MEDICARE

## 2024-05-08 DIAGNOSIS — G89.29 CHRONIC PAIN OF LEFT KNEE: ICD-10-CM

## 2024-05-08 DIAGNOSIS — M25.562 CHRONIC PAIN OF LEFT KNEE: ICD-10-CM

## 2024-05-08 PROCEDURE — 97110 THERAPEUTIC EXERCISES: CPT | Mod: GP | Performed by: PHYSICAL THERAPIST

## 2024-05-08 PROCEDURE — 97140 MANUAL THERAPY 1/> REGIONS: CPT | Mod: GP | Performed by: PHYSICAL THERAPIST

## 2024-05-08 ASSESSMENT — PAIN - FUNCTIONAL ASSESSMENT: PAIN_FUNCTIONAL_ASSESSMENT: 0-10

## 2024-05-08 ASSESSMENT — PAIN SCALES - GENERAL: PAINLEVEL_OUTOF10: 3

## 2024-05-08 NOTE — PROGRESS NOTES
Physical Therapy Treatment    Patient Name: Leonel Ceballos  MRN: [id number]  Today's Date: 5/8/2024  Time Calculation  Start Time: 1405  Stop Time: 1455  Time Calculation (min): 50 min  PT Therapeutic Procedures Time Entry  Manual Therapy Time Entry: 12  Neuromuscular Re-Education Time Entry: 8  Therapeutic Exercise Time Entry: 24       Current Problem  1. Chronic pain of left knee  Follow Up In Physical Therapy          Insurance:  Number of Treatments Authorized: 10 of Med [facility]  Certification Period Start Date: 04/03/24     Payor: [employer] MEDICARE / Plan: UNITED HEALTHCARE MEDICARE / Product Type: *No Product type* /     Subjective   General  Reason for Referral: s/p L TKA (DOS: 3/14/2024)  Referred By: Dr. Gabriele Pino  Past Medical History Relevant to Rehab: R TKA (reviewed medical history)  General Comment: Patient reports that he continues to feel good but main limiting factor is pain in the left lateral and posterior lower leg.    Performing HEP?: Yes    Precautions  Precautions  Precautions Comment: Minimal  Pain  Pain Assessment: 0-10  Pain Score: 3  Pain Location: Leg (L lower lateral leg)  Pain Orientation: Left       Objective   KNEE    Knee Palpation/Joint Mobility  Palpation/Joint Mobility Comment: Tenderness and tightness with noted trigger points left peroneals and gastroc lateral    Treatments:    Therapeutic Exercise  Therapeutic Exercise Activity 1: SciFit, L2, 5 mins  Therapeutic Exercise Activity 2: Gastroc Stretch, incline, 3x30 secs  Therapeutic Exercise Activity 3: Heel Raises SL R/L, 1x15  Therapeutic Exercise Activity 4: Dynamics: High knees, Butt kicks, tin soldiers, Hip flexor lunge  Therapeutic Exercise Activity 5: R/L lateral steps with green loop x 2 laps (40 feet each direction = 1 lap)  Therapeutic Exercise Activity 6: F/B diagonal steps with green loop x 2 laps (40 feet  each direction = 1 lap)  Therapeutic Exercise Activity 7: F/B monster walks with green loop x 2 laps  (40 feet  each direction = 1 lap)  Therapeutic Exercise Activity 8: Total Gym (L7) Squat 1x10 DL, 1x10 SL L    Balance/Neuromuscular Re-Education  Balance/Neuromuscular Re-Education Activity 1: Tiltboard Balance M/L, 5x30 secs  Balance/Neuromuscular Re-Education Activity 2: AirEx SLS R/L, 5x20 sec each  Balance/Neuromuscular Re-Education Activity 3: AirEx Beam Tandem R/L, 1 min each    Manual Therapy  Manual Therapy Activity 1: Written informed consent received prior to performing DN. Clean field utilized with trigger point dry needling with DN:Prone .25-50 mm needles to L peroneals and lateal gastroc. STM/DTM utilized between each needle insertion to all muscle groups DDN(manual x 10 min., TrDN x 5 min.).  Manual Therapy Activity 2: IASTM L peroneals/Gastroc x 5 mins    OP EDUCATION:  Outpatient Education  Education Comment: CONTINUE WITH CURRENT HEP    Assessment:  PT Assessment  Assessment Comment: Introduced dry needling to the left lateral lower leg as well as lateral gastroc secondary to patient reports of tightness and pain since knee surgery.  Patient with tolerance to treatment throughout with expected soreness and muscle twitch response noted.  Will monitor patient response to treatment this visit and continue to progress per patient tolerance with lower extremity strengthening, balance and functional movements.    Plan:     PT Plan: Skilled PT (Monitor response to dry needling and continue to progress lower extremity strengthening, range of motion and flexibility through the knee and lower leg)        Onset Date: 03/15/24       Goals:       Osei Patton, PT    This note was dictated with voice recognition software. It has not been proofread for grammatical errors, typographical mistakes or other semantic inconsistencies.

## 2024-05-14 ENCOUNTER — TREATMENT (OUTPATIENT)
Dept: PHYSICAL THERAPY | Facility: CLINIC | Age: 64
End: 2024-05-14
Payer: MEDICARE

## 2024-05-14 DIAGNOSIS — G89.29 CHRONIC PAIN OF LEFT KNEE: ICD-10-CM

## 2024-05-14 DIAGNOSIS — M25.562 CHRONIC PAIN OF LEFT KNEE: ICD-10-CM

## 2024-05-14 PROCEDURE — 97112 NEUROMUSCULAR REEDUCATION: CPT | Mod: GP,CQ

## 2024-05-14 PROCEDURE — 97110 THERAPEUTIC EXERCISES: CPT | Mod: GP,CQ

## 2024-05-14 ASSESSMENT — PAIN - FUNCTIONAL ASSESSMENT: PAIN_FUNCTIONAL_ASSESSMENT: 0-10

## 2024-05-14 ASSESSMENT — PAIN SCALES - GENERAL: PAINLEVEL_OUTOF10: 1

## 2024-05-14 NOTE — PROGRESS NOTES
Physical Therapy Treatment    Patient Name: Leonel Ceballos  MRN: [id number]  Today's Date: 5/14/2024  Time Calculation  Start Time: 0954  Stop Time: 1032  Time Calculation (min): 38 min  PT Therapeutic Procedures Time Entry  Neuromuscular Re-Education Time Entry: 8  Therapeutic Exercise Time Entry: 30,      Current Problem  1. Chronic pain of left knee  Follow Up In Physical Therapy            Insurance:  Payor: [employer] MEDICARE / Plan: UNITED HEALTHCARE MEDICARE / Product Type: *No Product type* /   Number of Treatments Authorized: 11 of Med [facility]  Certification Period Start Date: 04/03/24       Subjective   General  Reason for Referral: s/p L TKA (DOS: 3/14/2024)  Referred By: Dr. Gabriele Pino  Past Medical History Relevant to Rehab: R TKA (reviewed medical history)  General Comment: PT STATES HIS L KNEE HAS BEEN FEELING GOOD OF LATE.  PT STATES THE CALF PAIN IS A LITTLE BETTER BUT NOT GONE.  HE WAS UP LAST NIGHT UNTIL 3 AM BECAUSE OF IT.    Performing HEP?: Yes    Precautions  Precautions  Precautions Comment: Minimal  Pain  Pain Assessment: 0-10  Pain Score: 1  Pain Location: Knee  Pain Orientation: Left    Objective   General Observation  General Observation: DECREASED L TKE       Treatments:    Therapeutic Exercise  Therapeutic Exercise Activity 1: SciFit HILLS L3 X 6 MIN  Therapeutic Exercise Activity 2: GASTROC STRETCH X 2 MIN  Therapeutic Exercise Activity 3: INCLINE HEEL RAISES X 20  Therapeutic Exercise Activity 4: Dynamics: High knees, Butt kicks, tin soldiers, Hip flexor lunge, SIDE LUNGE  Therapeutic Exercise Activity 5: FOOTWORM GREEN LOOP 2 X 40', ZIG ZAG - MONSTER F/B X 40' EACH  Therapeutic Exercise Activity 6: SCOOTER 4 X 40'  Therapeutic Exercise Activity 7: TRX SQUATS 2 X 20    Balance/Neuromuscular Re-Education  Balance/Neuromuscular Re-Education Activity 1: TILT BOARD BALANCING X 2 MIN  Balance/Neuromuscular Re-Education Activity 2: AIREX SLS L/R X 1 MIN EACH  Balance/Neuromuscular  "Re-Education Activity 3: AIREX BEAM TANDEM STANCE 2 X 1 MIN  Balance/Neuromuscular Re-Education Activity 4: 6\" FWD STEP UP WITH HIGH KNEE X 2 MIN  Balance/Neuromuscular Re-Education Activity 5: 6\" LAT STEP OVERS X 2 MIN    Manual Therapy  Manual Therapy Performed: No                   OP EDUCATION:  Outpatient Education  Education Comment: CONTINUE WITH CURRENT HEP    Assessment:  PT Assessment  Assessment Comment: PT CONTINUES TO PROGRESS WITH HIS STRENGTH AND BALANCE.  NO C/O INCREASED PAIN DURING SESSION.    Plan:  OP PT Plan  PT Plan: Skilled PT (Monitor response to dry needling and continue to progress lower extremity strengthening, range of motion and flexibility through the knee and lower leg)  Onset Date: 03/15/24  Certification Period Start Date: 04/03/24  Number of Treatments Authorized: 11 of Med [facility]    Goals:       Abdi Johnson PTA  "

## 2024-05-16 ENCOUNTER — TREATMENT (OUTPATIENT)
Dept: PHYSICAL THERAPY | Facility: CLINIC | Age: 64
End: 2024-05-16
Payer: MEDICARE

## 2024-05-16 DIAGNOSIS — M25.562 CHRONIC PAIN OF LEFT KNEE: ICD-10-CM

## 2024-05-16 DIAGNOSIS — G89.29 CHRONIC PAIN OF LEFT KNEE: ICD-10-CM

## 2024-05-16 PROCEDURE — 97112 NEUROMUSCULAR REEDUCATION: CPT | Mod: GP,CQ

## 2024-05-16 PROCEDURE — 97110 THERAPEUTIC EXERCISES: CPT | Mod: GP,CQ

## 2024-05-16 ASSESSMENT — PAIN - FUNCTIONAL ASSESSMENT: PAIN_FUNCTIONAL_ASSESSMENT: 0-10

## 2024-05-16 ASSESSMENT — PAIN SCALES - GENERAL: PAINLEVEL_OUTOF10: 0 - NO PAIN

## 2024-05-16 NOTE — PROGRESS NOTES
"  Physical Therapy Treatment    Patient Name: Leonel Ceballos  MRN: [id number]  Today's Date: 5/16/2024  Time Calculation  Start Time: 1123  Stop Time: 1201  Time Calculation (min): 38 min  PT Therapeutic Procedures Time Entry  Neuromuscular Re-Education Time Entry: 8  Therapeutic Exercise Time Entry: 30,      Current Problem  1. Chronic pain of left knee  Follow Up In Physical Therapy            Insurance:  Payor: [employer] MEDICARE / Plan: UNITED HEALTHCARE MEDICARE / Product Type: *No Product type* /   Number of Treatments Authorized: 12 of Med [facility]  Certification Period Start Date: 04/03/24       Subjective   General  Reason for Referral: s/p L TKA (DOS: 3/14/2024)  Referred By: Dr. Gabriele Pino  Past Medical History Relevant to Rehab: R TKA (reviewed medical history)  General Comment: PT STATES HIS L KNEE FEELS A LITTLE STIFF THIS MORNING.    Performing HEP?: Yes    Precautions  Precautions  Precautions Comment: Minimal  Pain  Pain Assessment: 0-10  Pain Score: 0 - No pain  Pain Location: Knee  Pain Orientation: Left    Objective   General Observation  General Observation: DECREASED L TKE       Treatments:    Therapeutic Exercise  Therapeutic Exercise Activity 1: SciFit HILLS L3 X 6 MIN  Therapeutic Exercise Activity 2: GASTROC STRETCH X 1 MIN  Therapeutic Exercise Activity 3: INCLINE HEEL RAISES 2 X 15  Therapeutic Exercise Activity 4: Dynamics: High knees, Butt kicks, tin soldiers, Hip flexor lunge, SIDE LUNGE  Therapeutic Exercise Activity 5: FOOTWORM GREEN LOOP 2 X 40', ZIG ZAG - MONSTER F/B X 40' EACH  Therapeutic Exercise Activity 6: SCOOTER 4 X 40'  Therapeutic Exercise Activity 7: LEG PRESS L/R 40# X 20, 60# X 15, 80# X 10 EACH  Therapeutic Exercise Activity 8: SELF L KNEE FLEX MOBS 18\" STEP X 2 MIN    Balance/Neuromuscular Re-Education  Balance/Neuromuscular Re-Education Activity 1: BOSU FWD STEP UPS 2 X 1 MIN  Balance/Neuromuscular Re-Education Activity 2: 2 BOSU LAT STEP OVERS X 2 " MIN  Balance/Neuromuscular Re-Education Activity 3: AIREX SLS L/R X 1 MIN EACH  Balance/Neuromuscular Re-Education Activity 4: MINI TRAMP MARCHING X 2 MIN    Manual Therapy  Manual Therapy Performed: No                   OP EDUCATION:  Outpatient Education  Education Comment: CONTINUE WITH CURRENT HEP    Assessment:  PT Assessment  Assessment Comment: PT CINTHIA EX'S WELL.  HE CONTINUES TO BE CHALLENGED WITH BALANCE ACTIVITIES.    Plan:  OP PT Plan  PT Plan: Skilled PT (PT WOULD LIKE TO HAVE DRY NEEDLING AGAIN)  Onset Date: 03/15/24  Certification Period Start Date: 04/03/24  Number of Treatments Authorized: 12 of Med [facility]    Goals:       Abdi Johnson, PTA

## 2024-05-23 ENCOUNTER — TREATMENT (OUTPATIENT)
Dept: PHYSICAL THERAPY | Facility: CLINIC | Age: 64
End: 2024-05-23
Payer: MEDICARE

## 2024-05-23 DIAGNOSIS — G89.29 CHRONIC PAIN OF LEFT KNEE: ICD-10-CM

## 2024-05-23 DIAGNOSIS — M25.562 CHRONIC PAIN OF LEFT KNEE: ICD-10-CM

## 2024-05-23 PROCEDURE — 97110 THERAPEUTIC EXERCISES: CPT | Mod: GP | Performed by: PHYSICAL THERAPIST

## 2024-05-23 PROCEDURE — 97140 MANUAL THERAPY 1/> REGIONS: CPT | Mod: GP | Performed by: PHYSICAL THERAPIST

## 2024-05-23 ASSESSMENT — PAIN - FUNCTIONAL ASSESSMENT: PAIN_FUNCTIONAL_ASSESSMENT: 0-10

## 2024-05-23 ASSESSMENT — PAIN SCALES - GENERAL: PAINLEVEL_OUTOF10: 0 - NO PAIN

## 2024-05-23 NOTE — PROGRESS NOTES
Physical Therapy Treatment    Patient Name: Leonel Ceballos  MRN: [id number]  Today's Date: 5/23/2024  Time Calculation  Start Time: 1121  Stop Time: 1208  Time Calculation (min): 47 min  PT Therapeutic Procedures Time Entry  Manual Therapy Time Entry: 10  Neuromuscular Re-Education Time Entry: 8  Therapeutic Exercise Time Entry: 26       Current Problem  1. Chronic pain of left knee  Follow Up In Physical Therapy          Insurance:  Number of Treatments Authorized: 13 of Med Nec  Certification Period Start Date: 04/03/24     Payor: [employer] / Plan: [employer] / Product Type: *No Product type* /     Subjective   General  Reason for Referral: s/p L TKA (DOS: 3/14/2024)  Referred By: Dr. Gabriele Pino  Past Medical History Relevant to Rehab: R TKA (reviewed medical history)  General Comment: Patient reports that overall his knee has been feeling good.  He notes he has some intermittent stiffness/soreness after prolonged sitting in a car but subsides quickly upon walking.  He notes that the left lateral and posterior lower leg is still the most sore but has had improvement since dry needling and requests another session of that this visit.    Performing HEP?: Yes    Precautions  Precautions  Precautions Comment: Minimal  Pain  Pain Assessment: 0-10  Pain Score: 0 - No pain  Pain Location: Knee  Pain Orientation: Left       Objective   KNEE    Knee Palpation/Joint Mobility  Palpation/Joint Mobility Comment: Tenderness and tightness with noted trigger points left peroneals and gastroc lateral    Treatments:    Therapeutic Exercise  Therapeutic Exercise Activity 1: SciFit, [place], L3, 5 mins  Therapeutic Exercise Activity 2: Gastroc Stretch, incline, 3x30 secs  Therapeutic Exercise Activity 3: Incline Heel Raises, 1 min  Therapeutic Exercise Activity 4: Dynamics: High knees, Butt kicks, tin soldiers, Hip flexor lunge, SIDE LUNGE  Therapeutic Exercise Activity 5: R/L lateral steps and  diagonal with green loop x 2 laps (40 feet each direction = 1 lap)  Therapeutic Exercise Activity 6: Stool Scoots 2x40 feet  Therapeutic Exercise Activity 7: Leg press R/L, 60/80/100/120 lbs, x10 each         Manual Therapy  Manual Therapy Activity 1: Verbal informed consent received prior to performing DN. Clean field utilized with trigger point dry needling with DN:Prone .25-50 mm needles to L peroneals and lateal gastroc. STM/DTM utilized between each needle insertion to all muscle groups DDN(manual x 5 min., TrDN x 5 min.).  Manual Therapy Activity 2: STM L gastroc/peroneals, 5 mins                   OP EDUCATION:  Outpatient Education  Education Comment: CONTINUE WITH CURRENT HEP    Assessment:  PT Assessment  Assessment Comment: Performed DN this visit with good tolerance. Patient continues to have decreased soft-tissue mobility in the lower leg including peroneals and gastroc laterally. Able to perform exercises as listed without problems noted. Will continue to progress as able.    Plan:     PT Plan: Skilled PT (PT WOULD LIKE TO HAVE DRY NEEDLING AGAIN)        Onset Date: 03/15/24       Goals:       Osei Patton, PT    This note was dictated with voice recognition software. It has not been proofread for grammatical errors, typographical mistakes or other semantic inconsistencies.

## 2024-05-24 DIAGNOSIS — M25.562 CHRONIC PAIN OF LEFT KNEE: ICD-10-CM

## 2024-05-24 DIAGNOSIS — G89.29 CHRONIC PAIN OF LEFT KNEE: ICD-10-CM

## 2024-05-27 RX ORDER — BACLOFEN 20 MG/1
TABLET ORAL
Qty: 270 TABLET | Refills: 1 | Status: SHIPPED | OUTPATIENT
Start: 2024-05-27

## 2024-05-28 ENCOUNTER — TREATMENT (OUTPATIENT)
Dept: PHYSICAL THERAPY | Facility: CLINIC | Age: 64
End: 2024-05-28
Payer: MEDICARE

## 2024-05-28 DIAGNOSIS — M25.562 CHRONIC PAIN OF LEFT KNEE: ICD-10-CM

## 2024-05-28 DIAGNOSIS — G89.29 CHRONIC PAIN OF LEFT KNEE: ICD-10-CM

## 2024-05-28 PROCEDURE — 97112 NEUROMUSCULAR REEDUCATION: CPT | Mod: GP | Performed by: PHYSICAL THERAPIST

## 2024-05-28 PROCEDURE — 97110 THERAPEUTIC EXERCISES: CPT | Mod: GP | Performed by: PHYSICAL THERAPIST

## 2024-05-28 ASSESSMENT — PAIN SCALES - GENERAL: PAINLEVEL_OUTOF10: 0 - NO PAIN

## 2024-05-28 ASSESSMENT — PAIN - FUNCTIONAL ASSESSMENT: PAIN_FUNCTIONAL_ASSESSMENT: 0-10

## 2024-05-28 NOTE — PROGRESS NOTES
Physical Therapy Treatment    Patient Name: Leonel Ceballos  MRN: [id number]  Today's Date: 5/28/2024  Time Calculation  Start Time: 1115  Stop Time: 1203  Time Calculation (min): 48 min  PT Therapeutic Procedures Time Entry  Neuromuscular Re-Education Time Entry: 8  Therapeutic Exercise Time Entry: 26  Therapeutic Activity Time Entry: 8       Current Problem  1. Chronic pain of left knee  Follow Up In Physical Therapy          Insurance:  Number of Treatments Authorized: 14 of Med Nec  Certification Period Start Date: 04/03/24     Payor: [employer]ICARE / Plan: [employer] MEDICARE / Product Type: *No Product type* /     Subjective   General  Reason for Referral: s/p L TKA (DOS: 3/14/2024)  Referred By: Dr. Gabriele Pino  Past Medical History Relevant to Rehab: R TKA (reviewed medical history)  General Comment: Patient states that overall his knee has been feeling really good.  He does note some intermittent aching and soreness in the lower leg first thing in the morning particularly when wearing a knee sleeve at night.    Performing HEP?: Yes    Precautions  Precautions  Precautions Comment: Minimal  Pain  Pain Assessment: 0-10  Pain Score: 0 - No pain  Pain Location: Knee  Pain Orientation: Left       Objective   KNEE    Knee Palpation/Joint Mobility  Palpation/Joint Mobility Comment: Tenderness and tightness with noted trigger points left peroneals and gastroc lateral  Knee AROM  L knee flexion: (140°): 120°  L knee extension: (0°): 6° (from full extension)    Treatments:    Therapeutic Exercise  Therapeutic Exercise Activity 1: SciFit, [place], L3, 5 mins  Therapeutic Exercise Activity 2: Gastroc Stretch, incline, 3x30 secs  Therapeutic Exercise Activity 3: Dynamics: High knees, Butt kicks, tin soldiers, Hip flexor lunge, SIDE LUNGE  Therapeutic Exercise Activity 4: R/L lateral steps and diagonal with green loop x 2 laps (40 feet each direction = 1 lap)  Therapeutic Exercise Activity 5: Leg  press R/L, 60/80/100/120 lbs, x10 each  Therapeutic Exercise Activity 6: F/B diagonal steps with green loop x 2 laps (40 feet  each direction = 1 lap)  Therapeutic Exercise Activity 7: F/B monster walks with green loop x 2 laps (40 feet  each direction = 1 lap)  Therapeutic Exercise Activity 8: R/L lateral steps with yellow loop x 2 laps (40 feet each direction = 1 lap)    Balance/Neuromuscular Re-Education  Balance/Neuromuscular Re-Education Activity 1: AirEx SLS L/R 1 min each  Balance/Neuromuscular Re-Education Activity 2: BOSU Lateral Step-Over, 2 mins  Balance/Neuromuscular Re-Education Activity 3: BOSU Forward Step-up, 2 mins    Manual Therapy  Manual Therapy Performed: No    Therapeutic Activity  Therapeutic Activity 1: March- Suitcase Carry, 35 lb KB, 2x40 feet  Therapeutic Activity 2: Lateral Step-Down R/L, 6 inch, 2x10 each              OP EDUCATION:  Outpatient Education  Education Comment: CONTINUE WITH CURRENT HEP    Assessment:  PT Assessment  Assessment Comment: Progress strengthening of the lower extremity today with good tolerance overall.  Patient continues to be challenged with balance and eccentric control during tasks such as lateral stepdown.  Overall patient is progressing well with the left knee will continue to monitor symptoms and pain to the left lower leg.    Plan:     PT Plan: Skilled PT (Monitor progression of symptoms and advance strengthening, range of motion and functional tasks)        Onset Date: 03/15/24       Goals:       Osei Patton, PT    This note was dictated with voice recognition software. It has not been proofread for grammatical errors, typographical mistakes or other semantic inconsistencies.

## 2024-05-29 ENCOUNTER — OFFICE VISIT (OUTPATIENT)
Dept: OTOLARYNGOLOGY | Facility: CLINIC | Age: 64
End: 2024-05-29
Payer: MEDICARE

## 2024-05-29 ENCOUNTER — CLINICAL SUPPORT (OUTPATIENT)
Dept: AUDIOLOGY | Facility: CLINIC | Age: 64
End: 2024-05-29
Payer: MEDICARE

## 2024-05-29 VITALS — TEMPERATURE: 99.8 F | WEIGHT: 208 LBS | HEIGHT: 69 IN | BODY MASS INDEX: 30.81 KG/M2

## 2024-05-29 DIAGNOSIS — H90.3 SENSORINEURAL HEARING LOSS (SNHL) OF BOTH EARS: ICD-10-CM

## 2024-05-29 DIAGNOSIS — H93.13 TINNITUS OF BOTH EARS: Primary | ICD-10-CM

## 2024-05-29 DIAGNOSIS — H90.3 SENSORINEURAL HEARING LOSS (SNHL) OF BOTH EARS: Primary | ICD-10-CM

## 2024-05-29 PROCEDURE — 1157F ADVNC CARE PLAN IN RCRD: CPT | Performed by: AUDIOLOGIST

## 2024-05-29 PROCEDURE — 92557 COMPREHENSIVE HEARING TEST: CPT | Performed by: AUDIOLOGIST

## 2024-05-29 PROCEDURE — 99213 OFFICE O/P EST LOW 20 MIN: CPT | Performed by: OTOLARYNGOLOGY

## 2024-05-29 PROCEDURE — 1159F MED LIST DOCD IN RCRD: CPT | Performed by: OTOLARYNGOLOGY

## 2024-05-29 PROCEDURE — 1160F RVW MEDS BY RX/DR IN RCRD: CPT | Performed by: OTOLARYNGOLOGY

## 2024-05-29 PROCEDURE — 1036F TOBACCO NON-USER: CPT | Performed by: OTOLARYNGOLOGY

## 2024-05-29 PROCEDURE — 92550 TYMPANOMETRY & REFLEX THRESH: CPT | Performed by: AUDIOLOGIST

## 2024-05-29 PROCEDURE — 1160F RVW MEDS BY RX/DR IN RCRD: CPT | Performed by: AUDIOLOGIST

## 2024-05-29 PROCEDURE — 1157F ADVNC CARE PLAN IN RCRD: CPT | Performed by: OTOLARYNGOLOGY

## 2024-05-29 PROCEDURE — 1159F MED LIST DOCD IN RCRD: CPT | Performed by: AUDIOLOGIST

## 2024-05-29 NOTE — PROGRESS NOTES
AUDIOLOGY  AUDIOMETRIC EVALUATION    Name:  Leonel Ceballos  :  1960  Age:  63 y.o.  Date of Evaluation:  May 29, 2024    Reason for visit: Mr. Ceballos is seen in the clinic today at the request of Ilya Rodriguez MD in otolaryngology for an audiologic evaluation. Patient complains of difficulty hearing.    EVALUATION  See scanned audiogram: “Media” > “Audiology Report” > Document ID [id number].      RESULTS  Otoscopic Evaluation  Right Ear: clear ear canal with visualization of the tympanic membrane  Left Ear: clear ear canal with visualization of the tympanic membrane    Immittance Measures  Tympanometry:  Right Ear: Type A, normal tympanic membrane mobility with normal middle ear pressure  Left Ear: Type A, normal tympanic membrane mobility with normal middle ear pressure    Acoustic Reflexes:  Ipsilateral Right Ear: present and normal from 500 Hz to 4000 Hz  Ipsilateral Left Ear: present and normal from 500 Hz to 4000 Hz  Contralateral Right Ear: did not evaluate  Contralateral Left Ear: did not evaluate    Distortion Product Otoacoustic Emissions (DPOAEs)  Right Ear: absent from 1000 Hz to 8000 Hz  Left Ear: present from 1500 Hz to 2000 Hz, absent at 1000 Hz and from 3000 Hz to 8000 Hz    Audiometry  Test Technique and Reliability:   Standard audiometry via supra-aural headphones. Pulsed tone stimulus. Reliability is good.    Pure tone air and bone conduction audiometry:  Right Ear: normal hearing sensitivity through 2000 Hz with a mild sensorineural hearing loss in the higher frequencies   Left Ear: normal hearing sensitivity through 2000 Hz with a mild to moderately-severe sensorineural hearing loss in the higher frequencies     Speech Audiometry:  Speech Reception Threshold (SRT) Right Ear: 20 dB HL  Speech Reception Threshold (SRT) Left Ear: 20 dB HL   Word Recognition Score (WRS) Right Ear: Excellent, 100% at 60 dB HL  Word Recognition Score (WRS) Left Ear: Excellent, 100% at 60 dB HL      IMPRESSIONS  Audiometric evaluation revealed high frequency sensorineural hearing loss bilaterally. Tympanometry indicates normal tympanic membrane mobility with normal middle ear pressure bilaterally. Results show slight progression in the high frequencies compared with 07/31/2012 evaluation. The presence of acoustic reflexes within normal intensity limits is consistent with normal middle ear and brainstem function, and suggests that auditory sensitivity is not significantly impaired. Present Distortion Product Otoacoustic Emissions (DPOAEs) suggest normal/near normal cochlear outer hair cell function and are consistent with no greater than a mild hearing loss at those frequencies. Absent DPOAEs are consistent with abnormal cochlear outer hair cell function and some degree of hearing loss at those frequencies.    RECOMMENDATIONS  - Follow up with otolaryngology today as scheduled.  - Annual audiologic evaluation, sooner if an acute change is noted.  - Follow-up with medical care team as planned.    PATIENT EDUCATION  Discussed results, impressions and recommendations with the patient. Questions were addressed and the patient was encouraged to contact our office should concerns arise.    Time for this encounter: 8305-8101    Yanci Hernandez, CCC-A  Licensed Audiologist
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
## 2024-05-30 ENCOUNTER — TREATMENT (OUTPATIENT)
Dept: PHYSICAL THERAPY | Facility: CLINIC | Age: 64
End: 2024-05-30
Payer: MEDICARE

## 2024-05-30 DIAGNOSIS — G89.29 CHRONIC PAIN OF LEFT KNEE: ICD-10-CM

## 2024-05-30 DIAGNOSIS — M25.562 CHRONIC PAIN OF LEFT KNEE: ICD-10-CM

## 2024-05-30 PROCEDURE — 97110 THERAPEUTIC EXERCISES: CPT | Mod: GP | Performed by: PHYSICAL THERAPIST

## 2024-05-30 PROCEDURE — 97112 NEUROMUSCULAR REEDUCATION: CPT | Mod: GP | Performed by: PHYSICAL THERAPIST

## 2024-05-30 ASSESSMENT — PAIN SCALES - GENERAL: PAINLEVEL_OUTOF10: 0 - NO PAIN

## 2024-05-30 ASSESSMENT — PAIN - FUNCTIONAL ASSESSMENT: PAIN_FUNCTIONAL_ASSESSMENT: 0-10

## 2024-05-30 NOTE — PROGRESS NOTES
Physical Therapy Treatment/Progress Note    Patient Name: Leonel Ceballos  MRN: [id number]  Today's Date: 5/30/2024  Time Calculation  Start Time: 1207  Stop Time: 1255  Time Calculation (min): 48 min  PT Therapeutic Procedures Time Entry  Neuromuscular Re-Education Time Entry: 8  Therapeutic Exercise Time Entry: 26  Therapeutic Activity Time Entry: 5       Current Problem  1. Chronic pain of left knee  Follow Up In Physical Therapy          Insurance:  Number of Treatments Authorized: 15 of Med Nec  Certification Period Start Date: 04/03/24     Payor: [employer]ICARE / Plan: [employer] MEDICARE / Product Type: *No Product type* /     Subjective   General  Reason for Referral: s/p L TKA (DOS: 3/14/2024)  Referred By: Dr. Gabriele Pino  Past Medical History Relevant to Rehab: R TKA (reviewed medical history)  General Comment: Patient states that he continues to do well overall.  He notes he is having less pain in the lateral left lower leg as well.    Performing HEP?: Yes    Precautions  Precautions  Precautions Comment: Minimal  Pain  Pain Assessment: 0-10  Pain Score: 0 - No pain  Pain Location: Knee  Pain Orientation: Left       Objective   KNEE    Observation  Observation Comment: Diffuse swelling L>R knee; Girth: Med Jt line: R 39cm, L 39.5 cm; Slight diffuse swelling noted in L lower leg; Girth 15cm distal med. jt line: R 37cm, L 37.7cm  Knee Palpation/Joint Mobility  Palpation/Joint Mobility Comment: Slight tenderness L lateral knee joint line; Tenderness L lateral gastroc muscle belly,  Knee AROM  R knee flexion: (140°): 125°  L knee flexion: (140°): 122°  R knee extension: (0°): 2° (from full extension)  L knee extension: (0°): 6° (from full extension)  Knee PROM  L knee flexion: (140°): 125°  Knee MMT  R knee flexion: (5/5): 5/5  L knee flexion: (5/5): 4/5  R knee extension: (5/5): 5/5  L knee extension: (5/5): 4/5    Special Tests  Other: SLS, EO, Firm: L 25 secs, R 25 sec; Squat:  Decreased hip and knee flexion noted with weight shift R  Gait  Gait Comment: Ambulation with SP cane with decreased stance time L LE and increased swing time R LE    Outcome Measures: NA this visit       Treatments:    Therapeutic Exercise  Therapeutic Exercise Activity 1: SciFit, [place], L3, 5 mins  Therapeutic Exercise Activity 2: Gastroc Stretch, incline, 3x30 secs  Therapeutic Exercise Activity 3: Dynamics: High knees, Butt kicks, tin soldiers, Hip flexor lunge, SIDE LUNGE  Therapeutic Exercise Activity 4: R/L lateral steps and diagonal with green loop x 2 laps (40 feet each direction = 1 lap)  Therapeutic Exercise Activity 5: F/B diagonal steps with green loop x 2 laps (40 feet each direction = 1 lap)  Therapeutic Exercise Activity 6: F/B monster walks with green loop x 2 laps (40 feet each direction = 1 lap)  Therapeutic Exercise Activity 7: Leg press R/L, 80/100/120 lbs, x10 each    Balance/Neuromuscular Re-Education  Balance/Neuromuscular Re-Education Activity 1: AirEx SLS L/R 1 min each  Balance/Neuromuscular Re-Education Activity 2: AirEx SLS with 15 lb KB pass L/R 1 min each         Therapeutic Activity  Therapeutic Activity 1: Lateral Step-Down R/L, 6 inch, 2x10 each              OP EDUCATION:  Outpatient Education  Education Comment: CONTINUE WITH CURRENT HEP    Assessment:  PT Assessment  Assessment Comment: Patient demonstrating improvement overall since beginning PT. He continues to have difficulty with balance and dynamic LE strengthening but overall continues to improve towards goals. Patient will benefit from continued PT at a reduced frequency to assess response overall.    Plan:     PT Plan: Skilled PT (Monitor progression of symptoms and advance strengthening, range of motion and functional tasks)  PT Frequency: 1 time per week  Duration: 4 weeks  Onset Date: 03/15/24       Goals:  Active       PT Problem       STG  (Progressing)       Start:  05/30/24    Expected End:  06/20/24         1)  Patient will improve LEFS score by 9 points in order to perform functional activities at home and in the community. (GOAL NOT ASSESSED)  2) Patient will be able to complete ADLs with pain in knee less than 3/10. (GOAL MET)  3) Pt will improve knee flexion ROM to 115 degrees to be able to complete ADLs with less difficulty. (GOAL MET)  4) Patient will be independent with HEP to allow for continued improvement in daily tasks at home and in the community in 3 visits. (GOAL MET)           LTG (Progressing)       Start:  05/30/24    Expected End:  07/11/24       1) Patient will have 5/5 strength in hip musculature to aid in balance with ambulation on varied surfaces in community in 8 weeks (GOAL PARTIALLY MET)  2) Patient will be able to perform proper squatting technique in order to reduce compression on knee and prevent increased pain with daily tasks in 8 weeks. (GOAL IN PROGRESS)  3) Patient will be able to perform >30 seconds of SLS on even ground in order to allow for safe ambulation on all levels and to reduce fall risk within the community in 8 weeks. (GOAL IN PROGRESS)  4) Patient will improve LEFS score >/=70/80 points in order to perform functional activities at home and in the community by discharge. (GOAL NOT ASSESSED)                Osei Patton, PT    This note was dictated with voice recognition software. It has not been proofread for grammatical errors, typographical mistakes or other semantic inconsistencies.

## 2024-05-31 ENCOUNTER — TELEPHONE (OUTPATIENT)
Dept: ORTHOPEDIC SURGERY | Facility: CLINIC | Age: 64
End: 2024-05-31
Payer: MEDICARE

## 2024-05-31 DIAGNOSIS — G89.29 CHRONIC PAIN OF LEFT KNEE: ICD-10-CM

## 2024-05-31 DIAGNOSIS — M25.562 CHRONIC PAIN OF LEFT KNEE: ICD-10-CM

## 2024-05-31 RX ORDER — CEFADROXIL 500 MG/1
500 CAPSULE ORAL 2 TIMES DAILY
Qty: 14 CAPSULE | Refills: 0 | Status: SHIPPED | OUTPATIENT
Start: 2024-05-31 | End: 2024-06-06

## 2024-05-31 NOTE — TELEPHONE ENCOUNTER
"Patient called said he had a previous lt knee replacement on 3/14/24 he said he had a infection previously and was given and antibiotic from [facility] for this he said he believes the infection is still there and would like to know if we can refill this for him or would you like to see him in person. Patient said \"knee is very red, and sore\"   Patient is unsure of the antibiotic he received believes it was cefadroxil.   "

## 2024-06-06 ENCOUNTER — OFFICE VISIT (OUTPATIENT)
Dept: ORTHOPEDIC SURGERY | Facility: CLINIC | Age: 64
End: 2024-06-06
Payer: MEDICARE

## 2024-06-06 DIAGNOSIS — G89.29 CHRONIC PAIN OF LEFT KNEE: ICD-10-CM

## 2024-06-06 DIAGNOSIS — M25.562 CHRONIC PAIN OF LEFT KNEE: ICD-10-CM

## 2024-06-06 PROCEDURE — 1157F ADVNC CARE PLAN IN RCRD: CPT | Performed by: ORTHOPAEDIC SURGERY

## 2024-06-06 PROCEDURE — 99024 POSTOP FOLLOW-UP VISIT: CPT | Performed by: ORTHOPAEDIC SURGERY

## 2024-06-06 RX ORDER — CEFADROXIL 500 MG/1
500 CAPSULE ORAL 2 TIMES DAILY
Qty: 28 CAPSULE | Refills: 0 | Status: SHIPPED | OUTPATIENT
Start: 2024-06-06 | End: 2024-06-20

## 2024-06-06 NOTE — PROGRESS NOTES
This is a consultation from Dr. Curry Mejia MD for   Chief Complaint   Patient presents with    Left Knee - Pain     POSS INFECTION, 3/14/24 LT TKA       This is a 63 y.o. male who presents for follow-up for his left total knee.  He is just under 3 months out, he comes in because he had some redness around his incision.  He has been taking antibiotics for a few days.  He notes there is no drainage no fevers no chills.  He states there is an area where there is a small amount of pus at 1 point that has since dried up.  No pain in the knee, excellent resolution of his preoperative symptoms    Physical Exam    There has been no interval change in this patient's past medical, surgical, medications, allergies, family history or social history since the most recent visit to a provider within our department. 14 point review of systems was performed, reviewed, and negative except for pertinent positives documented in the history of present illness.     Constitutional: well developed, well nourished male in no acute distress  Psychiatric: normal mood, appropriate affect  Eyes: sclera anicteric  HENT: normocephalic/atraumatic  CV: regular rate and rhythm   Respiratory: non labored breathing  Integumentary: no rash  Neurological: moves all extremities    Left knee examination: Well-healed surgical incision, small amount of erythema around the central part of the incision but no fluctuance no drainage.      Procedures      Impression/Plan: This is a 63 y.o. male status post left total knee, he has some redness at the central part of his incision likely related to irritation from dissolving sutures.  He has what appeared to be a resolved right up suture abscess proximally.  No evidence of any deep infection.  Recommend he stay on antibiotics for a bit longer to clear this however will check back with close follow-up to make sure it is totally resolved    BMI Readings from Last 1 Encounters:   05/29/24 30.72 kg/m²      Lab  Results   Component Value Date    CREATININE 0.90 03/15/2024     Tobacco Use: Low Risk  (5/29/2024)    Patient History     Smoking Tobacco Use: Never     Smokeless Tobacco Use: Never     Passive Exposure: Not on file      Computed MELD 3.0 unavailable. One or more values for this score either were not found within the given timeframe or did not fit some other criterion.  Computed MELD-Na unavailable. One or more values for this score either were not found within the given timeframe or did not fit some other criterion.       Lab Results   Component Value Date    HGBA1C 5.9 (H) 01/18/2024     Lab Results   Component Value Date    STAPHMRSASCR (A) 03/05/2024     Isolated: Methicillin Susceptible Staphylococcus aureus (MSSA)

## 2024-06-27 ENCOUNTER — TREATMENT (OUTPATIENT)
Dept: PHYSICAL THERAPY | Facility: CLINIC | Age: 64
End: 2024-06-27
Payer: MEDICARE

## 2024-06-27 DIAGNOSIS — M25.562 CHRONIC PAIN OF LEFT KNEE: ICD-10-CM

## 2024-06-27 DIAGNOSIS — G89.29 CHRONIC PAIN OF LEFT KNEE: ICD-10-CM

## 2024-06-27 PROCEDURE — 97110 THERAPEUTIC EXERCISES: CPT | Mod: GP,CQ

## 2024-06-27 PROCEDURE — 97140 MANUAL THERAPY 1/> REGIONS: CPT | Mod: GP,CQ

## 2024-06-27 ASSESSMENT — PAIN - FUNCTIONAL ASSESSMENT: PAIN_FUNCTIONAL_ASSESSMENT: 0-10

## 2024-06-27 ASSESSMENT — PAIN SCALES - GENERAL: PAINLEVEL_OUTOF10: 0 - NO PAIN

## 2024-06-27 NOTE — PROGRESS NOTES
Physical Therapy Treatment    Patient Name: Leonel Ceballos  MRN: [id number]  Today's Date: 6/27/2024  Time Calculation  Start Time: 0950  Stop Time: 1030  Time Calculation (min): 40 min  PT Therapeutic Procedures Time Entry  Manual Therapy Time Entry: 10  Therapeutic Exercise Time Entry: 30,      Current Problem  1. Chronic pain of left knee  Follow Up In Physical Therapy            Insurance:  Payor: [employer] MEDICARE / Plan: UNITED [employer] MEDICARE / Product Type: *No Product type* /   Number of Treatments Authorized: 16 of Med [facility]  Certification Period Start Date: 04/03/24       Subjective   General  Reason for Referral: s/p L TKA (DOS: 3/14/2024)  Referred By: Dr. Gabriele Pino  Past Medical History Relevant to Rehab: R TKA (reviewed medical history)  General Comment: PT STATES HIS KNEE HAS BEEN FEELING GOOD. HIS CALF IS STILL HURTING THOUGH.  IT IS FEELING A LITTLE BETTER.    Performing HEP?: Yes    Precautions  Precautions  Precautions Comment: Minimal  Pain  Pain Assessment: 0-10  0-10 (Numeric) Pain Score: 0 - No pain  Pain Location: Knee  Pain Orientation: Left    Objective   General Observation  General Observation: NON ANTALGIC GAIT       Treatments:    Therapeutic Exercise  Therapeutic Exercise Activity 1: SciFit, [place], L3, 6 mins  Therapeutic Exercise Activity 2: Gastroc Stretch, incline, X 2 MIN  Therapeutic Exercise Activity 3: Dynamics: High knees, Butt kicks, tin soldiers, Hip flexor lunge, SIDE LUNGE  Therapeutic Exercise Activity 4: R/L lateral steps and diagonal with green loop x 2 laps (40 feet each direction = 1 lap)  Therapeutic Exercise Activity 5: F/B diagonal steps with green loop x 2 laps (40 feet each direction = 1 lap)  Therapeutic Exercise Activity 6: F/B monster walks with green loop x 2 laps (40 feet each direction = 1 lap)  Therapeutic Exercise Activity 7: SCOOTER L/R 2 X 40' EACH  Therapeutic Exercise Activity 8: HEX BAR SQUATS 2 X 10  Therapeutic Exercise Activity 9:  "12\" STEP SELF KNEE FLEX MOBS X 2 MIN    Balance/Neuromuscular Re-Education  Balance/Neuromuscular Re-Education Activity Performed: No    Manual Therapy  Manual Therapy Activity 1: IASTM L GASTROC, SOLEUS, PERONEAL  Manual Therapy Activity 2: L GASTROC, SOLEUS, PERONEAL STRETCHES                   OP EDUCATION:  Outpatient Education  Education Comment: CONTINUE WITH CURRENT HEP    Assessment:  PT Assessment  Assessment Comment: PT CINTHIA EX'S WELL.  HE CONTINUES TO IMPROVE WITH HIS STRENGTH AND BALANCE.  NOTED TIGHTNESS AND TENDERNESS WHEN PERFORMING IASTM TO HIS L GASTROC AND PERONEALS.  PT FELT BETTER AFTER SESSION.    Plan:  OP PT Plan  PT Plan: Skilled PT (Monitor progression of symptoms and advance strengthening, range of motion and functional tasks)  PT Frequency: 1 time per week  Duration: 4 weeks  Onset Date: 03/15/24  Certification Period Start Date: 04/03/24  Number of Treatments Authorized: 16 of Med [facility]    Goals:  Active       PT Problem            Abdi Johnson, PTA  "

## 2024-07-05 ENCOUNTER — TREATMENT (OUTPATIENT)
Dept: PHYSICAL THERAPY | Facility: CLINIC | Age: 64
End: 2024-07-05
Payer: MEDICARE

## 2024-07-05 DIAGNOSIS — M25.562 CHRONIC PAIN OF LEFT KNEE: ICD-10-CM

## 2024-07-05 DIAGNOSIS — G89.29 CHRONIC PAIN OF LEFT KNEE: ICD-10-CM

## 2024-07-05 PROCEDURE — 97112 NEUROMUSCULAR REEDUCATION: CPT | Mod: GP | Performed by: PHYSICAL THERAPIST

## 2024-07-05 PROCEDURE — 97530 THERAPEUTIC ACTIVITIES: CPT | Mod: GP | Performed by: PHYSICAL THERAPIST

## 2024-07-05 PROCEDURE — 97140 MANUAL THERAPY 1/> REGIONS: CPT | Mod: GP | Performed by: PHYSICAL THERAPIST

## 2024-07-05 PROCEDURE — 97110 THERAPEUTIC EXERCISES: CPT | Mod: GP | Performed by: PHYSICAL THERAPIST

## 2024-07-05 ASSESSMENT — PAIN - FUNCTIONAL ASSESSMENT: PAIN_FUNCTIONAL_ASSESSMENT: 0-10

## 2024-07-05 ASSESSMENT — PAIN SCALES - GENERAL: PAINLEVEL_OUTOF10: 1

## 2024-07-05 NOTE — PROGRESS NOTES
Physical Therapy Treatment    Patient Name: Leonel Ceballos  MRN: [id number]  Today's Date: 7/5/2024  Time Calculation  Start Time: 0917  Stop Time: 1003  Time Calculation (min): 46 min  PT Therapeutic Procedures Time Entry  Manual Therapy Time Entry: 6  Neuromuscular Re-Education Time Entry: 8  Therapeutic Exercise Time Entry: 20  Therapeutic Activity Time Entry: 10       Current Problem  1. Chronic pain of left knee  Follow Up In Physical Therapy          Insurance:  Number of Treatments Authorized: 17 of Med [facility]  Certification Period Start Date: 04/03/24     Payor: UNITED HE[employer] MEDICARE / Plan: UNITED HEALTHCARE MEDICARE / Product Type: *No Product type* /     Subjective   General  Reason for Referral: s/p L TKA (DOS: 3/14/2024)  Referred By: Dr. Gabriele Pino  Past Medical History Relevant to Rehab: R TKA (reviewed medical history)  General Comment: Patient reports that he has been having some stiffness/soreness in the L medial knee.    Performing HEP?: Yes    Precautions  Precautions  Precautions Comment: Minimal  Pain  Pain Assessment: 0-10  0-10 (Numeric) Pain Score: 1  Pain Location: Knee  Pain Orientation: Left       Objective   KNEE    Knee Palpation/Joint Mobility  Palpation/Joint Mobility Comment: Slight tenderness L lateral knee joint line; Tenderness L lateral gastroc muscle belly,  Knee AROM  L knee flexion: (140°): 133°  L knee extension: (0°): 2° (from full extension)      Treatments:    Therapeutic Exercise  Therapeutic Exercise Activity 1: SciFit, [place], L3, 6 mins  Therapeutic Exercise Activity 2: Gastroc Stretch, incline, X 2 MIN  Therapeutic Exercise Activity 3: Dynamics: High knees, Butt kicks, tin soldiers, Hip flexor lunge, SIDE LUNGE  Therapeutic Exercise Activity 4: R/L lateral steps and diagonal with green loop x 2 laps (40 feet each direction = 1 lap)  Therapeutic Exercise Activity 5: F/B diagonal steps with green loop x 2 laps (40 feet each direction = 1 lap)  Therapeutic Exercise  Activity 6: F/B monster walks with green loop x 2 laps (40 feet each direction = 1 lap)  Therapeutic Exercise Activity 7: Stool Scoots, 4x40 feet  Therapeutic Exercise Activity 8: SL RDL R/L, 10 lb KB, 2x10 each    Balance/Neuromuscular Re-Education  Balance/Neuromuscular Re-Education Activity 1: SLS L with ball toss at trampoline, 6 lbs, 2x15  Balance/Neuromuscular Re-Education Activity 2: AirEx SLS with 15 lb KB pass L/R 1 min each    Manual Therapy  Manual Therapy Activity 1: Patellofemoral Mobilization sup/inf glide: Grade III, in supine  Manual Therapy Activity 2: STM L distal quad/gastroc    Therapeutic Activity  Therapeutic Activity 1: Lateral Step-Down R/L, 6 inch, 2x10 each  Therapeutic Activity 2: Hex Bar Squat, 3x15      OP EDUCATION:  Outpatient Education  Education Comment: CONTINUE WITH CURRENT HEP    Assessment:  PT Assessment  Assessment Comment: Patient with good tolerance to treatment today.  Challenged with strengthening exercises with no increase symptoms noted throughout the session.  Will continue to focus on progression overall and advance per patient tolerance.    Plan:     PT Plan: Skilled PT (Monitor progression of symptoms and advance strengthening, range of motion and functional tasks)  PT Frequency: 1 time per week  Duration: 4 weeks  Onset Date: 03/15/24       Goals:  Active       PT Problem            Osei Patton, PT    This note was dictated with voice recognition software. It has not been proofread for grammatical errors, typographical mistakes or other semantic inconsistencies.

## 2024-07-11 ENCOUNTER — TREATMENT (OUTPATIENT)
Dept: PHYSICAL THERAPY | Facility: CLINIC | Age: 64
End: 2024-07-11
Payer: MEDICARE

## 2024-07-11 DIAGNOSIS — G89.29 CHRONIC PAIN OF LEFT KNEE: Primary | ICD-10-CM

## 2024-07-11 DIAGNOSIS — M25.562 CHRONIC PAIN OF LEFT KNEE: Primary | ICD-10-CM

## 2024-07-11 PROCEDURE — 97110 THERAPEUTIC EXERCISES: CPT | Mod: GP,CQ

## 2024-07-11 ASSESSMENT — PAIN - FUNCTIONAL ASSESSMENT: PAIN_FUNCTIONAL_ASSESSMENT: 0-10

## 2024-07-11 ASSESSMENT — PAIN SCALES - GENERAL: PAINLEVEL_OUTOF10: 1

## 2024-07-11 NOTE — PROGRESS NOTES
Physical Therapy Treatment    Patient Name: Leonel Ceballos  MRN: [id number]  Today's Date: 7/11/2024  Time Calculation  Start Time: 1129  Stop Time: 1201  Time Calculation (min): 32 min  PT Therapeutic Procedures Time Entry  Neuromuscular Re-Education Time Entry: 7  Therapeutic Exercise Time Entry: 25,      Current Problem  1. Chronic pain of left knee  Follow Up In Physical Therapy            Insurance:  Payor: [employer] MEDICARE / Plan: UNITED HEALTHCARE MEDICARE / Product Type: *No Product type* /   Number of Treatments Authorized: 18 of Med [facility]  Certification Period Start Date: 04/03/24       Subjective   General  Reason for Referral: s/p L TKA (DOS: 3/14/2024)  Referred By: Dr. Gabriele Pino  Past Medical History Relevant to Rehab: R TKA (reviewed medical history)  General Comment: PT STATES HIS L KNEE HAS BEEN FEELING BETTER. NOT AS SORE IN THE MEDIAL L KNEE. PT LATE TODAY. PT HAS DENTIST APPOINTMENT AT 1230PM TODAY.    Performing HEP?: Yes    Precautions  Precautions  Precautions Comment: Minimal  Pain  Pain Assessment: 0-10  0-10 (Numeric) Pain Score: 1  Pain Location: Knee  Pain Orientation: Left, Inner    Objective   General Observation  General Observation: IMPROVED BALANCE       Treatments:    Therapeutic Exercise  Therapeutic Exercise Activity 1: SciFit, [place], L3, 5 mins  Therapeutic Exercise Activity 2: Gastroc Stretch, incline, X 2 MIN  Therapeutic Exercise Activity 3: Dynamics: High knees, Butt kicks, tin soldiers, Hip flexor lunge, SIDE LUNGE  Therapeutic Exercise Activity 4: INCLINE HEEL RAISES X 1 MIN  Therapeutic Exercise Activity 5: Dynamics: High knees, Butt kicks, tin soldiers, Hip flexor lunge, SIDE LUNGE  Therapeutic Exercise Activity 6: LEG PRESS L/R 40# X 20, 60# X 15, 80 X 10 EACH  Therapeutic Exercise Activity 7: Stool Scoots, 4 x 50 feet  Therapeutic Exercise Activity 8: FOOTWORM BLUE LOOP 2X 40', ZIG ZAG - MONSTER F/B X 40' EACH    Balance/Neuromuscular  "Re-Education  Balance/Neuromuscular Re-Education Activity 1: TILT BOARD BALANCING // BAR X 2 MIN  Balance/Neuromuscular Re-Education Activity 2: BOSU FWD STEP UPS 2 X 1 MIN  Balance/Neuromuscular Re-Education Activity 3: 4\" STRADDLE STEP UPS 2 X 1 MIN                        OP EDUCATION:  Outpatient Education  Education Comment: CONTINUE WITH CURRENT HEP    Assessment:  PT Assessment  Assessment Comment: PT CINTHIA EX'S WELL.  HE CONTINUES TO PROGRESS WITH STRENGTHENING AND BALANCE ACTIVITIES.  NOTED FATIGUE IN HIS LE WITH TODAY'S THER EX.  NO C/O PAIN DURING SESSION.    Plan:  OP PT Plan  PT Plan: Skilled PT (PT HAS 1 MORE SCHEDULED APPOINT)  PT Frequency: 1 time per week  Duration: 4 weeks  Onset Date: 03/15/24  Certification Period Start Date: 04/03/24  Number of Treatments Authorized: 18 of Med [facility]    Goals:  Active       PT Problem            Abdi Johnson, PTA  "

## 2024-07-14 DIAGNOSIS — I10 ESSENTIAL (PRIMARY) HYPERTENSION: ICD-10-CM

## 2024-07-15 RX ORDER — LOSARTAN POTASSIUM AND HYDROCHLOROTHIAZIDE 12.5; 5 MG/1; MG/1
1 TABLET ORAL DAILY
Qty: 90 TABLET | Refills: 3 | Status: SHIPPED | OUTPATIENT
Start: 2024-07-15 | End: 2025-07-10

## 2024-07-23 ENCOUNTER — TREATMENT (OUTPATIENT)
Dept: PHYSICAL THERAPY | Facility: CLINIC | Age: 64
End: 2024-07-23
Payer: MEDICARE

## 2024-07-23 DIAGNOSIS — G89.29 CHRONIC PAIN OF LEFT KNEE: ICD-10-CM

## 2024-07-23 DIAGNOSIS — M25.562 CHRONIC PAIN OF LEFT KNEE: ICD-10-CM

## 2024-07-23 PROCEDURE — 97112 NEUROMUSCULAR REEDUCATION: CPT | Mod: GP | Performed by: PHYSICAL THERAPIST

## 2024-07-23 PROCEDURE — 97140 MANUAL THERAPY 1/> REGIONS: CPT | Mod: GP | Performed by: PHYSICAL THERAPIST

## 2024-07-23 PROCEDURE — 97110 THERAPEUTIC EXERCISES: CPT | Mod: GP | Performed by: PHYSICAL THERAPIST

## 2024-07-23 ASSESSMENT — PAIN - FUNCTIONAL ASSESSMENT: PAIN_FUNCTIONAL_ASSESSMENT: 0-10

## 2024-07-23 ASSESSMENT — PAIN SCALES - GENERAL: PAINLEVEL_OUTOF10: 0 - NO PAIN

## 2024-07-23 NOTE — PROGRESS NOTES
Physical Therapy Treatment/Discharge Summary    Patient Name: Leonel Ceballos  MRN: [id number]  Today's Date: 7/23/2024  Time Calculation  Start Time: 0946  Stop Time: 1035  Time Calculation (min): 49 min  PT Therapeutic Procedures Time Entry  Manual Therapy Time Entry: 10  Neuromuscular Re-Education Time Entry: 8  Therapeutic Exercise Time Entry: 20       Current Problem  1. Chronic pain of left knee  Follow Up In Physical Therapy          Insurance:  Number of Treatments Authorized: 19 of Med [facility]  Certification Period Start Date: 04/03/24     Payor: UNITED HEALTHCARE MEDICARE / Plan: UNITED HEALTHCARE MEDICARE / Product Type: *No Product type* /     Subjective   General  Reason for Referral: s/p L TKA (DOS: 3/14/2024)  Referred By: Dr. Gabriele Pino  Past Medical History Relevant to Rehab: R TKA (reviewed medical history)  General Comment: Patient states his knee has been feeling good with no reports of pain in his knee recently.    Performing HEP?: Yes    Precautions  Precautions  Precautions Comment: Minimal  Pain  Pain Assessment: 0-10  0-10 (Numeric) Pain Score: 0 - No pain  Pain Location: Knee  Pain Orientation: Left, Inner       Objective   KNEE    Knee AROM  R knee flexion: (140°): 125°  L knee flexion: (140°): 136°  R knee extension: (0°): 2° from full extension  L knee extension: (0°): 2° from full extension    Special Tests  Other: SLS, EO, Firm: L 25.04 secs, R 30 sec; Squat: Slight R weight shift.         Outcome Measures:  Other Measures  Lower Extremity Funtional Score (LEFS): 67/80 (83.75%)    Treatments:    Therapeutic Exercise  Therapeutic Exercise Activity 1: SciFit, [place], L3, 5 mins  Therapeutic Exercise Activity 2: Gastroc Stretch, incline, 1 min  Therapeutic Exercise Activity 3: Incline heel raises, 1 min  Therapeutic Exercise Activity 4: Dynamics: high knees, butt kicks, tin soldiers, forward lunge, side lunge    Balance/Neuromuscular Re-Education  Balance/Neuromuscular Re-Education  Activity 1: Tiltboard balance, M/L, 1 min  Balance/Neuromuscular Re-Education Activity 2: Tiltboard, F/B, 1 min  Balance/Neuromuscular Re-Education Activity 3: SLS, 30 sec R/L    Manual Therapy  Manual Therapy Activity 1: Verbal informed consent received prior to performing DN. Clean field utilized with trigger point dry needling with DN: Sidelying .25-50 mm needles to L peroneals and lateral gastroc. STM/DTM utilized between each needle insertion to all muscle groups DDN (manual x 5 min., TrDN x 5 min.).           Assessment:  PT Assessment  Assessment Comment: Patient tolerated session well today. Patient made improvements in knee range of motion and LE strength demonstrated through ROM measurements and MMT. Patient also made improvements with single leg stance balance and squat form. At this time patient is being discharged to [facility] with self-management.    Plan:     PT Plan: No Additional PT interventions required at this time (D/C patient to HEP and self-management. Patient with good understanding.)        Onset Date: 03/15/24       Goals:  Resolved       PT Problem       STG (Adequate for Discharge)       Start:  05/30/24    Expected End:  06/20/24    Resolved:  07/23/24      1) Patient will improve LEFS score by 9 points in order to perform functional activities at home and in the community. (GOAL MET)  2) Patient will be able to complete ADLs with pain in knee less than 3/10. (GOAL MET)  3) Pt will improve knee flexion ROM to 115 degrees to be able to complete ADLs with less difficulty. (GOAL MET)  4) Patient will be independent with HEP to allow for continued improvement in daily tasks at home and in the community in 3 visits. (GOAL MET)             LTG (Adequate for Discharge)       Start:  05/24/24    Expected End:  07/11/24    Resolved:  07/23/24    1) Patient will have 5/5 strength in hip musculature to aid in balance with ambulation on varied surfaces in community in 8 weeks (GOAL PARTIALLY MET)  2)  Patient will be able to perform proper squatting technique in order to reduce compression on knee and prevent increased pain with daily tasks in 8 weeks. (GOAL MET)  3) Patient will be able to perform >30 seconds of SLS on even ground in order to allow for safe ambulation on all levels and to reduce fall risk within the community in 8 weeks. (GOAL PARTIALLY MET)  4) Patient will improve LEFS score >/=70/80 points in order to perform functional activities at home and in the community by discharge. (GOAL PARTIALLY MET)                  ALYSON TORIBIO    This note was dictated with voice recognition software. It has not been proofread for grammatical errors, typographical mistakes or other semantic inconsistencies.

## 2024-07-30 ENCOUNTER — PATIENT MESSAGE (OUTPATIENT)
Dept: PRIMARY CARE | Facility: CLINIC | Age: 64
End: 2024-07-30
Payer: MEDICARE

## 2024-07-30 DIAGNOSIS — M79.662 PAIN OF LEFT CALF: Primary | ICD-10-CM

## 2024-08-03 DIAGNOSIS — F41.9 ANXIETY: ICD-10-CM

## 2024-08-05 RX ORDER — ESCITALOPRAM OXALATE 10 MG/1
10 TABLET ORAL DAILY
Qty: 90 TABLET | Refills: 2 | Status: SHIPPED | OUTPATIENT
Start: 2024-08-05

## 2024-08-06 ENCOUNTER — HOSPITAL ENCOUNTER (OUTPATIENT)
Dept: RADIOLOGY | Facility: CLINIC | Age: 64
Discharge: HOME | End: 2024-08-06
Payer: MEDICARE

## 2024-08-06 DIAGNOSIS — M79.662 PAIN OF LEFT CALF: ICD-10-CM

## 2024-08-06 PROCEDURE — 93971 EXTREMITY STUDY: CPT | Performed by: RADIOLOGY

## 2024-08-06 PROCEDURE — 93971 EXTREMITY STUDY: CPT

## 2024-08-16 ENCOUNTER — TELEPHONE (OUTPATIENT)
Dept: PRIMARY CARE | Facility: CLINIC | Age: 64
End: 2024-08-16
Payer: MEDICARE

## 2024-08-16 DIAGNOSIS — J40 BRONCHITIS: Primary | ICD-10-CM

## 2024-08-16 RX ORDER — AMOXICILLIN AND CLAVULANATE POTASSIUM 875; 125 MG/1; MG/1
1 TABLET, FILM COATED ORAL 2 TIMES DAILY
Qty: 14 TABLET | Refills: 0 | Status: SHIPPED | OUTPATIENT
Start: 2024-08-16 | End: 2024-08-23

## 2024-08-16 NOTE — TELEPHONE ENCOUNTER
Pt c/o cough with light yellow mucus x2 weeks.  No other symptoms.  COVID test today negative.  Has tried dayquil, nyquil, mucinex without relief.  Asking what you recommend OTC or Rx.  States leaving on cruise 8/22/24.  Please advise.  Ph: [phone]    If Rx, [facility] [address] [facility], [place]   DISPLAY PLAN FREE TEXT

## 2024-09-05 ENCOUNTER — APPOINTMENT (OUTPATIENT)
Dept: PRIMARY CARE | Facility: CLINIC | Age: 64
End: 2024-09-05
Payer: MEDICARE

## 2024-09-05 ASSESSMENT — ENCOUNTER SYMPTOMS
VOMITING: 0
ABDOMINAL PAIN: 0
HEADACHES: 0
FEVER: 0
DIARRHEA: 0
SHORTNESS OF BREATH: 0
APPETITE CHANGE: 0
CHILLS: 0
COUGH: 1
NAUSEA: 0

## 2024-09-05 NOTE — PROGRESS NOTES
[facility]: [facility]  PROGRESS NOTE      Leonel Ceballos is a 63 y.o. male that is presenting today for Follow-up (Persistent cough for 1 month).    Assessment/Plan   Diagnoses and all orders for this visit:  Chronic cough  -     azithromycin (Zithromax) 250 mg tablet; Take 2 tablets (500 mg) by mouth once daily for 1 day, THEN 1 tablet (250 mg) once daily for 4 days. Take 2 tabs (500 mg) by mouth today, than 1 daily for 4 days..  -     methylPREDNISolone (Medrol Dospak) 4 mg tablets; Take as directed on package.  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    Subjective   HPI    Cough and congestion for the last month, got a little better with the amoxicillin.  Minimally productive at this time.  No fever or shortness of breath is reported.    Review of Systems   Constitutional:  Negative for appetite change, chills and fever.   Respiratory:  Positive for cough. Negative for shortness of breath.    Cardiovascular:  Negative for chest pain.   Gastrointestinal:  Negative for abdominal pain, diarrhea, nausea and vomiting.   Neurological:  Negative for headaches.   All other systems reviewed and are negative.     Objective   Vitals:    09/06/24 1153   BP: 130/70   Pulse: 61   Temp: 36.9 °C  "(98.5 °F)   SpO2: 97%      Body mass index is 31.45 kg/m².  Physical Exam  Vitals reviewed.   Constitutional:       Appearance: Normal appearance.   Cardiovascular:      Rate and Rhythm: Normal rate and regular rhythm.      Heart sounds: No murmur heard.  Pulmonary:      Breath sounds: Normal breath sounds. No wheezing, rhonchi or rales.   Musculoskeletal:      Right lower leg: No edema.      Left lower leg: No edema.       Diagnostic Results   Lab Results   Component Value Date    GLUCOSE 131 (H) 03/15/2024    CALCIUM 9.5 03/15/2024     03/15/2024    K 4.5 03/15/2024    CO2 29 03/15/2024    CL 98 03/15/2024    BUN 13 03/15/2024    CREATININE 0.90 03/15/2024     Lab Results   Component Value Date    ALT 36 01/18/2024    AST 30 01/18/2024    ALKPHOS 58 01/18/2024    BILITOT 0.6 01/18/2024     Lab Results   Component Value Date    WBC 18.6 (H) 03/15/2024    HGB 12.3 (L) 03/15/2024    HCT 36.2 (L) 03/15/2024    MCV 90 03/15/2024     03/15/2024     Lab Results   Component Value Date    CHOL 155 01/18/2024    CHOL 169 12/12/2022    CHOL 152 12/02/2021     Lab Results   Component Value Date    HDL 33.0 (L) 01/18/2024    HDL 31 (L) 12/12/2022    HDL 26 (L) 12/02/2021     Lab Results   Component Value Date    LDLCALC 91 01/18/2024    LDLCALC 94 12/12/2022    LDLCALC 72 12/02/2021     Lab Results   Component Value Date    TRIG 156 (H) 01/18/2024    TRIG 218 (H) 12/12/2022    TRIG 270 (H) 12/02/2021     No components found for: \"CHOLHDL\"  Lab Results   Component Value Date    HGBA1C 5.9 (H) 01/18/2024     Other labs not included in the list above were reviewed either before or during this encounter.    History    Past Medical History:   Diagnosis Date    Atrial flutter (Multi) 09/04/2023    Hyperlipidemia 09/04/2023    Hypertension     Palpitations 09/04/2023    Primary osteoarthritis of both knees 09/04/2023    Right bundle branch block 09/04/2023     Past Surgical History:   Procedure Laterality Date    OTHER " SURGICAL HISTORY  01/04/2023    Cardiac cath His ablation    OTHER SURGICAL HISTORY  01/04/2023    Knee surgery    TONSILLECTOMY      TOTAL KNEE ARTHROPLASTY Right 09/2023     Family History   Problem Relation Name Age of Onset    Alzheimer's disease Mother 84     Cancer Father      Lung cancer Father      Other (esophagus) Father      Other (smoker) Father      Dementia Other Grandmother     Diabetes Other mother's side     Heart failure Other mother's side     Coronary artery disease Other mother's side     Prostate cancer Other mother's side      Social History     Socioeconomic History    Marital status:      Spouse name: Not on file    Number of children: Not on file    Years of education: Not on file    Highest education level: Not on file   Occupational History    Not on file   Tobacco Use    Smoking status: Never    Smokeless tobacco: Never   Vaping Use    Vaping status: Never Used   Substance and Sexual Activity    Alcohol use: Yes     Alcohol/week: 1.0 - 2.0 standard drink of alcohol     Types: 1 - 2 Glasses of wine per week    Drug use: Yes     Frequency: 2.0 times per week     Types: Marijuana     Comment: LAT USED 3/4/24-TO STOP FOR SURGERY    Sexual activity: Not on file   Other Topics Concern    Not on file   Social History Narrative    Not on file     Social Determinants of Health     Financial Resource Strain: Low Risk  (3/14/2024)    Overall Financial Resource Strain (CARDIA)     Difficulty of Paying Living Expenses: Not hard at all   Food Insecurity: Not on file   Transportation Needs: No Transportation Needs (3/28/2024)    OASIS : Transportation     Lack of Transportation (Medical): No     Lack of Transportation (Non-Medical): No     Patient Unable or Declines to Respond: No   Physical Activity: Not on file   Stress: Not on file   Social Connections: Feeling Socially Integrated (3/28/2024)    OASIS : Social Isolation     Frequency of experiencing loneliness or isolation: Never    Intimate Partner Violence: Not on file   Housing Stability: Low Risk  (3/14/2024)    Housing Stability Vital Sign     Unable to Pay for Housing in the Last Year: No     Number of Places Lived in the Last Year: 1     Unstable Housing in the Last Year: No     No Known Allergies  Current Outpatient Medications on File Prior to Visit   Medication Sig Dispense Refill    ascorbic acid (Vitamin C) 1,000 mg tablet Take 1 tablet (1,000 mg) by mouth once daily.      atorvastatin (Lipitor) 10 mg tablet Take 1 tablet (10 mg) by mouth once daily. 90 tablet 3    baclofen (Lioresal) 20 mg tablet TAKE 1 TABLET ORALLY 3 TIMES PER DAY FOR MUSCLE SPASM. 270 tablet 1    cetirizine (ZyrTEC) 10 mg capsule Take 1 capsule (10 mg) by mouth once daily.      DAILY MULTI-VITAMIN ORAL Take 1 tablet by mouth once daily.      escitalopram (Lexapro) 10 mg tablet TAKE 1 TABLET BY MOUTH EVERY DAY 90 tablet 2    losartan-hydrochlorothiazide (Hyzaar) 50-12.5 mg tablet TAKE 1 TABLET BY MOUTH EVERY DAY FOR 90 DAYS 90 tablet 3    meloxicam (Mobic) 15 mg tablet TAKE 1 TABLET EVERY DAY AS NEEDED 30 tablet 1    methylsulfonylmethane (MSM ORAL) Take 1 capsule by mouth once daily. 3000 mg      naproxen sodium (Aleve) 220 mg capsule Take 1 tablet by mouth 2 times a day as needed.      omeprazole (PriLOSEC) 20 mg DR capsule TAKE 1 CAPSULE BY MOUTH EVERY OTHER DAY 45 capsule 3    tamsulosin (Flomax) 0.4 mg 24 hr capsule Take 1 capsule (0.4 mg) by mouth once daily at bedtime.      [DISCONTINUED] aspirin 81 mg EC tablet TAKE 1 TABLET BY MOUTH TWO TIMES A DAY 60 tablet 0    [DISCONTINUED] aspirin 81 mg EC tablet Take 1 tablet (81 mg) by mouth 2 times a day. 60 tablet 0    [DISCONTINUED] chlorhexidine (Peridex) 0.12 % solution Use cap to measure 15 mL.  Swish/gargle mouthwash for at least 30 seconds.  Do not swallow.  Use night before surgery after brushing teeth and morning of surgery after brushing teeth. (Patient not taking: Reported on 4/10/2024) 473 mL 0     [DISCONTINUED] docusate sodium (Colace) 100 mg capsule Take 1 capsule (100 mg) by mouth 2 times a day as needed for constipation. 30 capsule 0    [DISCONTINUED] naproxen (Naprosyn) 500 mg tablet Take 1 tablet (500 mg) by mouth 2 times a day. 28 tablet 0    [DISCONTINUED] ondansetron ODT (Zofran-ODT) 4 mg disintegrating tablet Take 1 tablet (4 mg) by mouth every 8 hours if needed for nausea or vomiting. (Patient not taking: Reported on 4/10/2024) 20 tablet 0    [DISCONTINUED] oxyCODONE-acetaminophen (Percocet) 5-325 mg tablet Take 1 tablet by mouth every 4 hours if needed for severe pain (7 - 10). (Patient not taking: Reported on 5/29/2024) 36 tablet 0    [DISCONTINUED] tadalafil (Cialis) 20 mg tablet Take 1 tablet (20 mg) by mouth once daily as needed for erectile dysfunction. 30 tablet 1     No current facility-administered medications on file prior to visit.     Immunization History   Administered Date(s) Administered    Flu vaccine (IIV4), preservative free *Check age/dose* 12/03/2021    Influenza, Seasonal, Quadrivalent, Adjuvanted 12/19/2023    Influenza, injectable, quadrivalent 11/27/2018, 11/18/2019    Influenza, seasonal, injectable 09/16/2013    Pfizer COVID-19 vaccine, Fall 2023, 12 years and older, (30mcg/0.3mL) 12/19/2023    Pfizer Purple Cap SARS-CoV-2 02/24/2021, 03/17/2021, 12/03/2021    RSV, 60 Years And Older (AREXVY) 12/19/2023    Tdap vaccine, age 7 year and older (BOOSTRIX, ADACEL) 01/12/2016    Zoster vaccine, recombinant, adult (SHINGRIX) 03/06/2020, 06/07/2020     Patient's medical history was reviewed and updated either before or during this encounter.       Curry Mejia MD

## 2024-09-06 ENCOUNTER — OFFICE VISIT (OUTPATIENT)
Dept: PRIMARY CARE | Facility: CLINIC | Age: 64
End: 2024-09-06
Payer: MEDICARE

## 2024-09-06 VITALS
OXYGEN SATURATION: 97 % | HEART RATE: 61 BPM | HEIGHT: 69 IN | TEMPERATURE: 98.5 F | SYSTOLIC BLOOD PRESSURE: 130 MMHG | BODY MASS INDEX: 31.55 KG/M2 | WEIGHT: 213 LBS | DIASTOLIC BLOOD PRESSURE: 70 MMHG

## 2024-09-06 DIAGNOSIS — R73.9 HYPERGLYCEMIA: ICD-10-CM

## 2024-09-06 DIAGNOSIS — E78.2 MIXED HYPERLIPIDEMIA: ICD-10-CM

## 2024-09-06 DIAGNOSIS — E61.1 IRON DEFICIENCY ASSOCIATED WITH FAMILIAL RESTLESS LEGS SYNDROME: ICD-10-CM

## 2024-09-06 DIAGNOSIS — R05.3 CHRONIC COUGH: Primary | ICD-10-CM

## 2024-09-06 DIAGNOSIS — G25.81 RLS (RESTLESS LEGS SYNDROME): ICD-10-CM

## 2024-09-06 DIAGNOSIS — Z12.5 ENCOUNTER FOR PROSTATE CANCER SCREENING: ICD-10-CM

## 2024-09-06 DIAGNOSIS — E55.9 VITAMIN D DEFICIENCY: ICD-10-CM

## 2024-09-06 DIAGNOSIS — G25.81 IRON DEFICIENCY ASSOCIATED WITH FAMILIAL RESTLESS LEGS SYNDROME: ICD-10-CM

## 2024-09-06 PROCEDURE — 1157F ADVNC CARE PLAN IN RCRD: CPT | Performed by: INTERNAL MEDICINE

## 2024-09-06 PROCEDURE — 99213 OFFICE O/P EST LOW 20 MIN: CPT | Performed by: INTERNAL MEDICINE

## 2024-09-06 PROCEDURE — 3008F BODY MASS INDEX DOCD: CPT | Performed by: INTERNAL MEDICINE

## 2024-09-06 PROCEDURE — 1036F TOBACCO NON-USER: CPT | Performed by: INTERNAL MEDICINE

## 2024-09-06 PROCEDURE — 3078F DIAST BP <80 MM HG: CPT | Performed by: INTERNAL MEDICINE

## 2024-09-06 PROCEDURE — 3075F SYST BP GE 130 - 139MM HG: CPT | Performed by: INTERNAL MEDICINE

## 2024-09-06 PROCEDURE — 1159F MED LIST DOCD IN RCRD: CPT | Performed by: INTERNAL MEDICINE

## 2024-09-06 PROCEDURE — 1126F AMNT PAIN NOTED NONE PRSNT: CPT | Performed by: INTERNAL MEDICINE

## 2024-09-06 RX ORDER — AZITHROMYCIN 250 MG/1
TABLET, FILM COATED ORAL
Qty: 6 TABLET | Refills: 0 | Status: SHIPPED | OUTPATIENT
Start: 2024-09-06 | End: 2024-09-11

## 2024-09-06 RX ORDER — METHYLPREDNISOLONE 4 MG/1
TABLET ORAL
Qty: 21 TABLET | Refills: 0 | Status: SHIPPED | OUTPATIENT
Start: 2024-09-06 | End: 2024-09-13

## 2024-09-06 ASSESSMENT — PATIENT HEALTH QUESTIONNAIRE - PHQ9
1. LITTLE INTEREST OR PLEASURE IN DOING THINGS: NOT AT ALL
2. FEELING DOWN, DEPRESSED OR HOPELESS: NOT AT ALL
SUM OF ALL RESPONSES TO PHQ9 QUESTIONS 1 AND 2: 0

## 2024-09-06 ASSESSMENT — ENCOUNTER SYMPTOMS
LOSS OF SENSATION IN FEET: 0
DEPRESSION: 0
OCCASIONAL FEELINGS OF UNSTEADINESS: 0

## 2024-09-06 ASSESSMENT — PAIN SCALES - GENERAL: PAINLEVEL: 0-NO PAIN

## 2024-09-20 ENCOUNTER — HOSPITAL ENCOUNTER (OUTPATIENT)
Dept: RADIOLOGY | Facility: CLINIC | Age: 64
Discharge: HOME | End: 2024-09-20
Payer: MEDICARE

## 2024-09-20 ENCOUNTER — PATIENT MESSAGE (OUTPATIENT)
Dept: PRIMARY CARE | Facility: CLINIC | Age: 64
End: 2024-09-20
Payer: MEDICARE

## 2024-09-20 DIAGNOSIS — R05.3 CHRONIC COUGH: ICD-10-CM

## 2024-09-20 PROCEDURE — 71046 X-RAY EXAM CHEST 2 VIEWS: CPT

## 2024-09-27 ENCOUNTER — PATIENT MESSAGE (OUTPATIENT)
Dept: PRIMARY CARE | Facility: CLINIC | Age: 64
End: 2024-09-27
Payer: MEDICARE

## 2024-09-27 DIAGNOSIS — J32.9 SINUSITIS, UNSPECIFIED CHRONICITY, UNSPECIFIED LOCATION: Primary | ICD-10-CM

## 2024-09-27 RX ORDER — METHYLPREDNISOLONE 4 MG/1
TABLET ORAL
Qty: 21 TABLET | Refills: 0 | Status: SHIPPED | OUTPATIENT
Start: 2024-09-27 | End: 2024-10-04

## 2024-09-27 RX ORDER — AMOXICILLIN AND CLAVULANATE POTASSIUM 875; 125 MG/1; MG/1
1 TABLET, FILM COATED ORAL 2 TIMES DAILY
Qty: 20 TABLET | Refills: 0 | Status: SHIPPED | OUTPATIENT
Start: 2024-09-27 | End: 2024-10-07

## 2024-11-07 ENCOUNTER — HOSPITAL ENCOUNTER (OUTPATIENT)
Dept: RADIOLOGY | Facility: CLINIC | Age: 64
Discharge: HOME | End: 2024-11-07
Payer: MEDICARE

## 2024-11-07 ENCOUNTER — APPOINTMENT (OUTPATIENT)
Dept: ORTHOPEDIC SURGERY | Facility: CLINIC | Age: 64
End: 2024-11-07
Payer: MEDICARE

## 2024-11-07 DIAGNOSIS — G89.29 CHRONIC PAIN OF LEFT KNEE: ICD-10-CM

## 2024-11-07 DIAGNOSIS — G89.29 CHRONIC PAIN OF LEFT KNEE: Primary | ICD-10-CM

## 2024-11-07 DIAGNOSIS — M25.562 CHRONIC PAIN OF LEFT KNEE: ICD-10-CM

## 2024-11-07 DIAGNOSIS — M25.562 CHRONIC PAIN OF LEFT KNEE: Primary | ICD-10-CM

## 2024-11-07 DIAGNOSIS — Z96.651 STATUS POST TOTAL KNEE REPLACEMENT USING CEMENT, RIGHT: ICD-10-CM

## 2024-11-07 PROCEDURE — 73564 X-RAY EXAM KNEE 4 OR MORE: CPT | Mod: LT

## 2024-11-07 PROCEDURE — 99213 OFFICE O/P EST LOW 20 MIN: CPT | Performed by: ORTHOPAEDIC SURGERY

## 2024-11-07 PROCEDURE — 1159F MED LIST DOCD IN RCRD: CPT | Performed by: ORTHOPAEDIC SURGERY

## 2024-11-07 PROCEDURE — 1157F ADVNC CARE PLAN IN RCRD: CPT | Performed by: ORTHOPAEDIC SURGERY

## 2024-11-07 PROCEDURE — 1160F RVW MEDS BY RX/DR IN RCRD: CPT | Performed by: ORTHOPAEDIC SURGERY

## 2024-11-07 PROCEDURE — 1036F TOBACCO NON-USER: CPT | Performed by: ORTHOPAEDIC SURGERY

## 2024-11-07 PROCEDURE — G2211 COMPLEX E/M VISIT ADD ON: HCPCS | Performed by: ORTHOPAEDIC SURGERY

## 2024-11-07 ASSESSMENT — PAIN - FUNCTIONAL ASSESSMENT: PAIN_FUNCTIONAL_ASSESSMENT: NO/DENIES PAIN

## 2024-11-07 NOTE — PROGRESS NOTES
This is a consultation from Dr. Curry Mejia MD for   Chief Complaint   Patient presents with    Left Knee - Follow-up     3/14/24 LT TKA         This is a 64 y.o. male who presents for follow up for his left knee.  Patient is about 6 months out from total knee replacement. he is doing very well.  Back to all normal activities.  Walking without difficulty.  No drainage from his incision no fevers no chills.  Not using any assistive devices or pain medications for his knee.      Physical Exam    There has been no interval change in this patient's past medical, surgical, medications, allergies, family history or social history since the most recent visit to a provider within our department. 14 point review of systems was performed, reviewed, and negative except for pertinent positives documented in the history of present illness.     Constitutional: well developed, well nourished male in no acute distress  Psychiatric: normal mood, appropriate affect  Eyes: sclera anicteric  HENT: normocephalic/atraumatic  CV: regular rate and rhythm   Respiratory: non labored breathing  Integumentary: no rash  Neurological: moves all extremities    Left knee examination: Well-healed surgical incision no erythema no drainage, stable to varus and valgus stress range of motion 0 to 120 degrees neurovascular intact distally    Xrays were ordered by me, they were reviewed and independently interpreted by me today, they show stable left total knee arthroplasty no evidence of any fracture loosening or dislocation.    Procedures      Impression/Plan: This is a 64 y.o. male who is about 6 left months out from left total knee.  Weightbearing as tolerated activity as tolera left shiela, plan for routine radiographic follow-up.    BMI Readings from Last 1 Encounters:   09/06/24 31.45 kg/m²      Lab Results   Component Value Date    CREATININE 0.90 03/15/2024     Tobacco Use: Low Risk  (11/7/2024)    Patient History     Smoking Tobacco Use:  Never     Smokeless Tobacco Use: Never     Passive Exposure: Not on file      Computed MELD 3.0 unavailable. One or more values for this score either were not found within the given timeframe or did not fit some other criterion.  Computed MELD-Na unavailable. One or more values for this score either were not found within the given timeframe or did not fit some other criterion.       Lab Results   Component Value Date    HGBA1C 5.9 (H) 01/18/2024     Lab Results   Component Value Date    STAPHMRSASCR (A) 03/05/2024     Isolated: Methicillin Susceptible Staphylococcus aureus (MSSA)

## 2024-11-23 DIAGNOSIS — G89.29 CHRONIC PAIN OF LEFT KNEE: ICD-10-CM

## 2024-11-23 DIAGNOSIS — M25.562 CHRONIC PAIN OF LEFT KNEE: ICD-10-CM

## 2024-11-25 RX ORDER — BACLOFEN 20 MG/1
TABLET ORAL
Qty: 270 TABLET | Refills: 1 | Status: SHIPPED | OUTPATIENT
Start: 2024-11-25

## 2024-12-13 ENCOUNTER — LAB (OUTPATIENT)
Dept: LAB | Facility: LAB | Age: 64
End: 2024-12-13
Payer: MEDICARE

## 2024-12-13 ENCOUNTER — HOSPITAL ENCOUNTER (OUTPATIENT)
Dept: RADIOLOGY | Facility: CLINIC | Age: 64
Discharge: HOME | End: 2024-12-13
Payer: MEDICARE

## 2024-12-13 DIAGNOSIS — E61.1 IRON DEFICIENCY ASSOCIATED WITH FAMILIAL RESTLESS LEGS SYNDROME: ICD-10-CM

## 2024-12-13 DIAGNOSIS — E55.9 VITAMIN D DEFICIENCY: ICD-10-CM

## 2024-12-13 DIAGNOSIS — G25.81 RLS (RESTLESS LEGS SYNDROME): ICD-10-CM

## 2024-12-13 DIAGNOSIS — R73.9 HYPERGLYCEMIA: ICD-10-CM

## 2024-12-13 DIAGNOSIS — Z01.89 ENCOUNTER FOR ROUTINE LABORATORY TESTING: ICD-10-CM

## 2024-12-13 DIAGNOSIS — E78.2 MIXED HYPERLIPIDEMIA: ICD-10-CM

## 2024-12-13 DIAGNOSIS — M19.031 ARTHRITIS OF RIGHT WRIST: ICD-10-CM

## 2024-12-13 DIAGNOSIS — G25.81 IRON DEFICIENCY ASSOCIATED WITH FAMILIAL RESTLESS LEGS SYNDROME: ICD-10-CM

## 2024-12-13 DIAGNOSIS — Z12.5 ENCOUNTER FOR PROSTATE CANCER SCREENING: ICD-10-CM

## 2024-12-13 LAB
25(OH)D3 SERPL-MCNC: 24 NG/ML (ref 30–100)
ALBUMIN SERPL BCP-MCNC: 4 G/DL (ref 3.4–5)
ALP SERPL-CCNC: 58 U/L (ref 33–136)
ALT SERPL W P-5'-P-CCNC: 26 U/L (ref 10–52)
ANION GAP SERPL CALCULATED.3IONS-SCNC: 11 MMOL/L (ref 10–20)
AST SERPL W P-5'-P-CCNC: 22 U/L (ref 9–39)
BASOPHILS # BLD AUTO: 0.07 X10*3/UL (ref 0–0.1)
BASOPHILS NFR BLD AUTO: 0.9 %
BILIRUB SERPL-MCNC: 0.5 MG/DL (ref 0–1.2)
BUN SERPL-MCNC: 16 MG/DL (ref 6–23)
CALCIUM SERPL-MCNC: 9 MG/DL (ref 8.6–10.3)
CHLORIDE SERPL-SCNC: 107 MMOL/L (ref 98–107)
CHOLEST SERPL-MCNC: 151 MG/DL (ref 0–199)
CHOLEST/HDLC SERPL: 5.4 {RATIO}
CO2 SERPL-SCNC: 27 MMOL/L (ref 21–32)
CREAT SERPL-MCNC: 0.86 MG/DL (ref 0.5–1.3)
EGFRCR SERPLBLD CKD-EPI 2021: >90 ML/MIN/1.73M*2
EOSINOPHIL # BLD AUTO: 0.51 X10*3/UL (ref 0–0.7)
EOSINOPHIL NFR BLD AUTO: 6.2 %
ERYTHROCYTE [DISTWIDTH] IN BLOOD BY AUTOMATED COUNT: 13 % (ref 11.5–14.5)
EST. AVERAGE GLUCOSE BLD GHB EST-MCNC: 117 MG/DL
GLUCOSE SERPL-MCNC: 94 MG/DL (ref 74–99)
HBA1C MFR BLD: 5.7 %
HCT VFR BLD AUTO: 46 % (ref 41–52)
HDLC SERPL-MCNC: 28.2 MG/DL
HGB BLD-MCNC: 15.2 G/DL (ref 13.5–17.5)
IMM GRANULOCYTES # BLD AUTO: 0.08 X10*3/UL (ref 0–0.7)
IMM GRANULOCYTES NFR BLD AUTO: 1 % (ref 0–0.9)
IRON SATN MFR SERPL: 18 % (ref 25–45)
IRON SERPL-MCNC: 71 UG/DL (ref 35–150)
LDLC SERPL CALC-MCNC: 79 MG/DL
LYMPHOCYTES # BLD AUTO: 2.28 X10*3/UL (ref 1.2–4.8)
LYMPHOCYTES NFR BLD AUTO: 27.8 %
MCH RBC QN AUTO: 30.3 PG (ref 26–34)
MCHC RBC AUTO-ENTMCNC: 33 G/DL (ref 32–36)
MCV RBC AUTO: 92 FL (ref 80–100)
MONOCYTES # BLD AUTO: 1.14 X10*3/UL (ref 0.1–1)
MONOCYTES NFR BLD AUTO: 13.9 %
NEUTROPHILS # BLD AUTO: 4.11 X10*3/UL (ref 1.2–7.7)
NEUTROPHILS NFR BLD AUTO: 50.2 %
NON HDL CHOLESTEROL: 123 MG/DL (ref 0–149)
NRBC BLD-RTO: 0 /100 WBCS (ref 0–0)
PLATELET # BLD AUTO: 255 X10*3/UL (ref 150–450)
POTASSIUM SERPL-SCNC: 4.4 MMOL/L (ref 3.5–5.3)
PROT SERPL-MCNC: 6.5 G/DL (ref 6.4–8.2)
PSA SERPL-MCNC: 1.19 NG/ML
RBC # BLD AUTO: 5.02 X10*6/UL (ref 4.5–5.9)
SODIUM SERPL-SCNC: 141 MMOL/L (ref 136–145)
TIBC SERPL-MCNC: 403 UG/DL (ref 240–445)
TRIGL SERPL-MCNC: 217 MG/DL (ref 0–149)
TSH SERPL-ACNC: 2.47 MIU/L (ref 0.44–3.98)
UIBC SERPL-MCNC: 332 UG/DL (ref 110–370)
VLDL: 43 MG/DL (ref 0–40)
WBC # BLD AUTO: 8.2 X10*3/UL (ref 4.4–11.3)

## 2024-12-13 PROCEDURE — 82306 VITAMIN D 25 HYDROXY: CPT

## 2024-12-13 PROCEDURE — 83540 ASSAY OF IRON: CPT

## 2024-12-13 PROCEDURE — 36415 COLL VENOUS BLD VENIPUNCTURE: CPT

## 2024-12-13 PROCEDURE — 73110 X-RAY EXAM OF WRIST: CPT | Mod: RT

## 2024-12-13 PROCEDURE — 83036 HEMOGLOBIN GLYCOSYLATED A1C: CPT

## 2024-12-13 PROCEDURE — 85025 COMPLETE CBC W/AUTO DIFF WBC: CPT

## 2024-12-13 PROCEDURE — 80053 COMPREHEN METABOLIC PANEL: CPT

## 2024-12-13 PROCEDURE — 83550 IRON BINDING TEST: CPT

## 2024-12-13 PROCEDURE — 80061 LIPID PANEL: CPT

## 2024-12-13 PROCEDURE — G0103 PSA SCREENING: HCPCS

## 2024-12-13 PROCEDURE — 84443 ASSAY THYROID STIM HORMONE: CPT

## 2024-12-19 ASSESSMENT — ENCOUNTER SYMPTOMS
COUGH: 0
DIARRHEA: 0
APPETITE CHANGE: 0
FEVER: 0
ABDOMINAL PAIN: 0
SHORTNESS OF BREATH: 0
CHILLS: 0
NAUSEA: 0
VOMITING: 0
HEADACHES: 0

## 2024-12-20 ENCOUNTER — TELEPHONE (OUTPATIENT)
Dept: OPHTHALMOLOGY | Facility: CLINIC | Age: 64
End: 2024-12-20
Payer: MEDICARE

## 2024-12-20 NOTE — PROGRESS NOTES
[facility]: [facility]  MEDICARE WELLNESS EXAM      Leonel Ceballos is a 64 y.o. male that is presenting today for Medicare Annual Wellness Visit Subsequent.    Assessment/Plan    Diagnoses and all orders for this visit:  Annual physical exam  Mixed hyperlipidemia  -     CBC and Auto Differential; Future  -     Comprehensive Metabolic Panel; Future  -     Lipid Panel; Future  -     TSH with reflex to Free T4 if abnormal; Future  RLS (restless legs syndrome)  Primary hypertension  Atrial flutter, unspecified type (Multi)  Primary osteoarthritis of both knees  Hyperglycemia  -     Hemoglobin A1C; Future  Vitamin D deficiency  -     Vitamin D 25-Hydroxy,Total (for eval of Vitamin D levels); Future  Encounter for prostate cancer screening  -     Prostate Specific Antigen; Future  Benign prostatic hyperplasia with urinary frequency  -     tamsulosin (Flomax) 0.4 mg 24 hr capsule; Take 1 capsule (0.4 mg) by mouth 2 times a day.  Other orders  -     Follow Up In Primary Care - Medicare Annual; Future      Titrate tamsulosin for increasing LUTS - if not effective return to .      ADVANCED CARE PLANNING  Advanced Care Planning was discussed with patient:  The patient has an active advanced care plan on file. The patient has an active surrogate decision-maker on file.  Encouraged the patient to confirm that Living Will and Healthcare Power of  (HCPoA) are accurate and up to date.  Encouraged the patient to confirm that our office be provided a copy of any documentation in the event that anything changes.    ACTIVITIES OF DAILY LIVING  Basic ADLs:  Bathing: Independent, Dressing: Independent, Toileting: Independent, Transferring: Independent, Continence: Independent, Feeding: Independent.    Instrumental ADLs:  Ability to use phone: Independent, Shopping: Independent, Cooking: Independent, House-keeping: Independent, Laundry: Independent, Transportation: Independent, Medication Management:  Independent, Finance Management: Independent.    Subjective   HPI  This patient presents today for annual physical, Medicare wellness exam.  Discussed screening/prevention, healthy lifestyle and code status.   Reviewed the patient's wishes regarding decision making.    Consultant visits and notes reviewed: None    Stable from a functional standpoint in regard to ADLs and IADLs.  No recent falls are reported.    The patient denies chest pain and shortness of breath.  No exertion-provoked or anginal-type symptoms are reported.    Review of Systems   Constitutional:  Negative for appetite change, chills and fever.   Respiratory:  Negative for cough and shortness of breath.    Cardiovascular:  Negative for chest pain.   Gastrointestinal:  Negative for abdominal pain, diarrhea, nausea and vomiting.   Neurological:  Negative for headaches.   All other systems reviewed and are negative.    Objective   Vitals:    12/23/24 1532   BP: 128/84   Pulse: 62   Temp: 36.2 °C (97.2 °F)   SpO2: 96%      Body mass index is 34.26 kg/m².  Physical Exam  Vitals reviewed.   Constitutional:       General: He is not in acute distress.     Appearance: He is not toxic-appearing.   HENT:      Head: Normocephalic and atraumatic.      Mouth/Throat:      Mouth: Mucous membranes are moist.   Eyes:      Pupils: Pupils are equal, round, and reactive to light.   Cardiovascular:      Rate and Rhythm: Normal rate and regular rhythm.      Heart sounds: No murmur heard.  Pulmonary:      Breath sounds: Normal breath sounds. No wheezing, rhonchi or rales.   Abdominal:      General: There is no distension.      Palpations: Abdomen is soft.   Musculoskeletal:      Right lower leg: No edema.      Left lower leg: No edema.   Neurological:      General: No focal deficit present.      Mental Status: He is alert and oriented to person, place, and time.       Diagnostic Results   Lab Results   Component Value Date    GLUCOSE 94 12/13/2024    CALCIUM 9.0 12/13/2024  "    12/13/2024    K 4.4 12/13/2024    CO2 27 12/13/2024     12/13/2024    BUN 16 12/13/2024    CREATININE 0.86 12/13/2024     Lab Results   Component Value Date    ALT 26 12/13/2024    AST 22 12/13/2024    ALKPHOS 58 12/13/2024    BILITOT 0.5 12/13/2024     Lab Results   Component Value Date    WBC 8.2 12/13/2024    HGB 15.2 12/13/2024    HCT 46.0 12/13/2024    MCV 92 12/13/2024     12/13/2024     Lab Results   Component Value Date    CHOL 151 12/13/2024    CHOL 155 01/18/2024    CHOL 169 12/12/2022     Lab Results   Component Value Date    HDL 28.2 12/13/2024    HDL 33.0 (L) 01/18/2024    HDL 31 (L) 12/12/2022     Lab Results   Component Value Date    LDLCALC 79 12/13/2024    LDLCALC 91 01/18/2024    LDLCALC 94 12/12/2022     Lab Results   Component Value Date    TRIG 217 (H) 12/13/2024    TRIG 156 (H) 01/18/2024    TRIG 218 (H) 12/12/2022     No components found for: \"CHOLHDL\"  Lab Results   Component Value Date    HGBA1C 5.7 (H) 12/13/2024     Other labs not included in the list above reviewed either before or during this encounter.    History   Past Medical History:   Diagnosis Date    Atrial flutter (Multi) 09/04/2023    Hyperlipidemia 09/04/2023    Hypertension     Palpitations 09/04/2023    Primary osteoarthritis of both knees 09/04/2023    Right bundle branch block 09/04/2023     Past Surgical History:   Procedure Laterality Date    OTHER SURGICAL HISTORY  01/04/2023    Cardiac cath His ablation    OTHER SURGICAL HISTORY  01/04/2023    Knee surgery    TONSILLECTOMY      TOTAL KNEE ARTHROPLASTY Right 09/2023     Family History   Problem Relation Name Age of Onset    Alzheimer's disease Mother 84     Cancer Father      Lung cancer Father      Other (esophagus) Father      Other (smoker) Father      Dementia Other Grandmother     Diabetes Other mother's side     Heart failure Other mother's side     Coronary artery disease Other mother's side     Prostate cancer Other mother's side  "     Social History     Socioeconomic History    Marital status:      Spouse name: Not on file    Number of children: Not on file    Years of education: Not on file    Highest education level: Not on file   Occupational History    Not on file   Tobacco Use    Smoking status: Never    Smokeless tobacco: Never   Vaping Use    Vaping status: Never Used   Substance and Sexual Activity    Alcohol use: Yes     Alcohol/week: 1.0 - 2.0 standard drink of alcohol     Types: 1 - 2 Glasses of wine per week    Drug use: Yes     Frequency: 2.0 times per week     Types: Marijuana     Comment: LAT USED 3/4/24-TO STOP FOR SURGERY    Sexual activity: Not on file   Other Topics Concern    Not on file   Social History Narrative    Not on file     Social Drivers of Health     Financial Resource Strain: Low Risk  (3/14/2024)    Overall Financial Resource Strain (CARDIA)     Difficulty of Paying Living Expenses: Not hard at all   Food Insecurity: Not on file   Transportation Needs: No Transportation Needs (3/28/2024)    OASIS : Transportation     Lack of Transportation (Medical): No     Lack of Transportation (Non-Medical): No     Patient Unable or Declines to Respond: No   Physical Activity: Not on file   Stress: Not on file   Social Connections: Feeling Socially Integrated (3/28/2024)    OASIS : Social Isolation     Frequency of experiencing loneliness or isolation: Never   Intimate Partner Violence: Not on file   Housing Stability: Low Risk  (3/14/2024)    Housing Stability Vital Sign     Unable to Pay for Housing in the Last Year: No     Number of Places Lived in the Last Year: 1     Unstable Housing in the Last Year: No     No Known Allergies  Current Outpatient Medications on File Prior to Visit   Medication Sig Dispense Refill    ascorbic acid (Vitamin C) 1,000 mg tablet Take 1 tablet (1,000 mg) by mouth once daily.      atorvastatin (Lipitor) 10 mg tablet Take 1 tablet (10 mg) by mouth once daily. 90 tablet 3     baclofen (Lioresal) 20 mg tablet TAKE 1 TABLET ORALLY 3 TIMES PER DAY FOR MUSCLE SPASM. 270 tablet 1    cetirizine (ZyrTEC) 10 mg capsule Take 1 capsule (10 mg) by mouth once daily.      DAILY MULTI-VITAMIN ORAL Take 1 tablet by mouth once daily.      escitalopram (Lexapro) 10 mg tablet TAKE 1 TABLET BY MOUTH EVERY DAY 90 tablet 2    losartan-hydrochlorothiazide (Hyzaar) 50-12.5 mg tablet TAKE 1 TABLET BY MOUTH EVERY DAY FOR 90 DAYS 90 tablet 3    meloxicam (Mobic) 15 mg tablet TAKE 1 TABLET EVERY DAY AS NEEDED 30 tablet 1    methylsulfonylmethane (MSM ORAL) Take 1 capsule by mouth once daily. 3000 mg      naproxen sodium (Aleve) 220 mg capsule Take 1 tablet by mouth 2 times a day as needed.      omeprazole (PriLOSEC) 20 mg DR capsule TAKE 1 CAPSULE BY MOUTH EVERY OTHER DAY 45 capsule 3    [DISCONTINUED] tamsulosin (Flomax) 0.4 mg 24 hr capsule Take 1 capsule (0.4 mg) by mouth once daily at bedtime.       No current facility-administered medications on file prior to visit.     Immunization History   Administered Date(s) Administered    COVID-19, mRNA, LNP-S, PF, 30 mcg/0.3 mL dose 02/24/2021, 03/17/2021, 12/03/2021    Flu vaccine (IIV4), preservative free *Check age/dose* 12/03/2021    Flu vaccine, trivalent, preservative free, HIGH-DOSE, age 65y+ (Fluzone) 10/07/2024    Influenza, Seasonal, Quadrivalent, Adjuvanted 12/19/2023    Influenza, injectable, quadrivalent 11/27/2018, 11/18/2019    Influenza, seasonal, injectable 09/16/2013    Pfizer COVID-19 vaccine, 12 years and older, (30mcg/0.3mL) (Comirnaty) 12/19/2023, 10/07/2024    Pneumococcal conjugate vaccine, 20-valent (PREVNAR 20) 10/07/2024    RSV, 60 Years And Older (AREXVY) 12/19/2023    Tdap vaccine, age 7 year and older (BOOSTRIX, ADACEL) 01/12/2016    Zoster vaccine, recombinant, adult (SHINGRIX) 03/06/2020, 06/07/2020     Patient's medical history was reviewed and updated either before or during this encounter.     Curry Mejia MD

## 2024-12-23 ENCOUNTER — OFFICE VISIT (OUTPATIENT)
Dept: PRIMARY CARE | Facility: CLINIC | Age: 64
End: 2024-12-23
Payer: MEDICARE

## 2024-12-23 VITALS
HEART RATE: 62 BPM | TEMPERATURE: 97.2 F | BODY MASS INDEX: 34.36 KG/M2 | SYSTOLIC BLOOD PRESSURE: 128 MMHG | DIASTOLIC BLOOD PRESSURE: 84 MMHG | OXYGEN SATURATION: 96 % | WEIGHT: 232 LBS | HEIGHT: 69 IN

## 2024-12-23 DIAGNOSIS — M17.0 PRIMARY OSTEOARTHRITIS OF BOTH KNEES: ICD-10-CM

## 2024-12-23 DIAGNOSIS — E78.2 MIXED HYPERLIPIDEMIA: ICD-10-CM

## 2024-12-23 DIAGNOSIS — R35.0 BENIGN PROSTATIC HYPERPLASIA WITH URINARY FREQUENCY: ICD-10-CM

## 2024-12-23 DIAGNOSIS — N40.1 BENIGN PROSTATIC HYPERPLASIA WITH URINARY FREQUENCY: ICD-10-CM

## 2024-12-23 DIAGNOSIS — I48.92 ATRIAL FLUTTER, UNSPECIFIED TYPE (MULTI): ICD-10-CM

## 2024-12-23 DIAGNOSIS — I10 PRIMARY HYPERTENSION: ICD-10-CM

## 2024-12-23 DIAGNOSIS — G25.81 RLS (RESTLESS LEGS SYNDROME): ICD-10-CM

## 2024-12-23 DIAGNOSIS — Z12.5 ENCOUNTER FOR PROSTATE CANCER SCREENING: ICD-10-CM

## 2024-12-23 DIAGNOSIS — E55.9 VITAMIN D DEFICIENCY: ICD-10-CM

## 2024-12-23 DIAGNOSIS — Z00.00 ANNUAL PHYSICAL EXAM: Primary | ICD-10-CM

## 2024-12-23 DIAGNOSIS — R73.9 HYPERGLYCEMIA: ICD-10-CM

## 2024-12-23 PROBLEM — Q17.9 CONGENITAL MALFORMATION OF EAR: Status: ACTIVE | Noted: 2024-12-23

## 2024-12-23 PROBLEM — H90.3 ASYMMETRICAL SENSORINEURAL HEARING LOSS: Status: ACTIVE | Noted: 2024-12-23

## 2024-12-23 PROBLEM — M17.9 OSTEOARTHRITIS OF KNEE: Status: ACTIVE | Noted: 2024-12-23

## 2024-12-23 PROBLEM — H93.13 TINNITUS OF BOTH EARS: Status: ACTIVE | Noted: 2024-12-23

## 2024-12-23 PROBLEM — M54.50 LOW BACK PAIN, UNSPECIFIED: Status: ACTIVE | Noted: 2024-12-23

## 2024-12-23 PROCEDURE — 3074F SYST BP LT 130 MM HG: CPT | Performed by: INTERNAL MEDICINE

## 2024-12-23 PROCEDURE — 1125F AMNT PAIN NOTED PAIN PRSNT: CPT | Performed by: INTERNAL MEDICINE

## 2024-12-23 PROCEDURE — 1036F TOBACCO NON-USER: CPT | Performed by: INTERNAL MEDICINE

## 2024-12-23 PROCEDURE — G0439 PPPS, SUBSEQ VISIT: HCPCS | Performed by: INTERNAL MEDICINE

## 2024-12-23 PROCEDURE — 99215 OFFICE O/P EST HI 40 MIN: CPT | Performed by: INTERNAL MEDICINE

## 2024-12-23 PROCEDURE — 1157F ADVNC CARE PLAN IN RCRD: CPT | Performed by: INTERNAL MEDICINE

## 2024-12-23 PROCEDURE — 3008F BODY MASS INDEX DOCD: CPT | Performed by: INTERNAL MEDICINE

## 2024-12-23 PROCEDURE — 1159F MED LIST DOCD IN RCRD: CPT | Performed by: INTERNAL MEDICINE

## 2024-12-23 PROCEDURE — 3079F DIAST BP 80-89 MM HG: CPT | Performed by: INTERNAL MEDICINE

## 2024-12-23 RX ORDER — TAMSULOSIN HYDROCHLORIDE 0.4 MG/1
0.4 CAPSULE ORAL 2 TIMES DAILY
Start: 2024-12-23 | End: 2025-12-23

## 2024-12-23 ASSESSMENT — PATIENT HEALTH QUESTIONNAIRE - PHQ9
SUM OF ALL RESPONSES TO PHQ9 QUESTIONS 1 AND 2: 0
1. LITTLE INTEREST OR PLEASURE IN DOING THINGS: NOT AT ALL
2. FEELING DOWN, DEPRESSED OR HOPELESS: NOT AT ALL

## 2024-12-23 ASSESSMENT — PAIN SCALES - GENERAL: PAINLEVEL_OUTOF10: 2

## 2024-12-27 DIAGNOSIS — K21.9 GASTRO-ESOPHAGEAL REFLUX DISEASE WITHOUT ESOPHAGITIS: ICD-10-CM

## 2024-12-27 RX ORDER — OMEPRAZOLE 20 MG/1
20 CAPSULE, DELAYED RELEASE ORAL EVERY OTHER DAY
Qty: 45 CAPSULE | Refills: 3 | Status: SHIPPED | OUTPATIENT
Start: 2024-12-27

## 2024-12-31 NOTE — TELEPHONE ENCOUNTER
Pt should schedule for an IOP/DIL per Dr. Escobar    Left message on patients voicemail to call office to schedule    Notes in Drawer

## 2025-01-04 DIAGNOSIS — E78.5 HYPERLIPIDEMIA, UNSPECIFIED: ICD-10-CM

## 2025-01-06 RX ORDER — ATORVASTATIN CALCIUM 10 MG/1
10 TABLET, FILM COATED ORAL DAILY
Qty: 90 TABLET | Refills: 3 | Status: SHIPPED | OUTPATIENT
Start: 2025-01-06

## 2025-01-16 ENCOUNTER — OFFICE VISIT (OUTPATIENT)
Dept: OPHTHALMOLOGY | Facility: CLINIC | Age: 65
End: 2025-01-16
Payer: MEDICARE

## 2025-01-16 DIAGNOSIS — H52.7 REFRACTION ERROR: ICD-10-CM

## 2025-01-16 DIAGNOSIS — H43.812 POSTERIOR VITREOUS DEGENERATION, LEFT: ICD-10-CM

## 2025-01-16 DIAGNOSIS — H25.813 COMBINED FORMS OF AGE-RELATED CATARACT OF BOTH EYES: Primary | ICD-10-CM

## 2025-01-16 PROCEDURE — 99203 OFFICE O/P NEW LOW 30 MIN: CPT | Performed by: OPHTHALMOLOGY

## 2025-01-16 PROCEDURE — 99213 OFFICE O/P EST LOW 20 MIN: CPT | Performed by: OPHTHALMOLOGY

## 2025-01-16 ASSESSMENT — EXTERNAL EXAM - RIGHT EYE: OD_EXAM: NORMAL

## 2025-01-16 ASSESSMENT — ENCOUNTER SYMPTOMS
ENDOCRINE NEGATIVE: 0
RESPIRATORY NEGATIVE: 0
CONSTITUTIONAL NEGATIVE: 0
EYES NEGATIVE: 0
GASTROINTESTINAL NEGATIVE: 0
MUSCULOSKELETAL NEGATIVE: 0
CARDIOVASCULAR NEGATIVE: 0
ALLERGIC/IMMUNOLOGIC NEGATIVE: 0
PSYCHIATRIC NEGATIVE: 0
HEMATOLOGIC/LYMPHATIC NEGATIVE: 0
NEUROLOGICAL NEGATIVE: 0

## 2025-01-16 ASSESSMENT — EXTERNAL EXAM - LEFT EYE: OS_EXAM: NORMAL

## 2025-01-16 ASSESSMENT — VISUAL ACUITY
OS_CC+: -1
OD_CC: 20/30
CORRECTION_TYPE: GLASSES
OS_CC: 20/20
METHOD: SNELLEN - LINEAR
OD_PH_CC: 20/20
OD_CC+: -2

## 2025-01-16 ASSESSMENT — CUP TO DISC RATIO
OS_RATIO: 0.25
OD_RATIO: 0.25

## 2025-01-16 ASSESSMENT — SLIT LAMP EXAM - LIDS
COMMENTS: NORMAL
COMMENTS: NORMAL

## 2025-01-16 ASSESSMENT — REFRACTION_WEARINGRX
OD_AXIS: 080
OD_ADD: +2.50
OS_AXIS: 095
OD_SPHERE: -3.00
OD_CYLINDER: -2.25
OS_ADD: +2.50
SPECS_TYPE: PAL
OS_CYLINDER: -2.50
OS_SPHERE: -2.25

## 2025-01-16 ASSESSMENT — TONOMETRY
IOP_METHOD: GOLDMANN APPLANATION
OS_IOP_MMHG: 18
OD_IOP_MMHG: 19

## 2025-01-16 ASSESSMENT — PAIN SCALES - GENERAL: PAINLEVEL_OUTOF10: 0-NO PAIN

## 2025-01-16 NOTE — PROGRESS NOTES
Assessment/Plan   Problem List Items Addressed This Visit       Combined forms of age-related cataract of both eyes - Primary     Non significant cataract noted on exam. Will plan to continue to monitor with serial exam.            Posterior vitreous degeneration, left     No signs of retinal compromise, will monitor. Should call if any worsening vision symptoms.          Refraction error     No RX check today, will plan at next cataract follow up.             Provided reassurance regarding above diagnoses and care received in the office visit today. Discussed outcomes and options along with the importance of treatment compliance. Understands the importance of any follow up visits. Patient instructed to call/communicate with our office if any new issues, questions, or concerns.     Will plan to see back in 6 months short check with repeat refraction or sooner PRN

## 2025-01-16 NOTE — ASSESSMENT & PLAN NOTE
No RX check today, will plan at next cataract follow up.    scr 2.27  crcl 69.2; w/IBW: 35.8 w/ABW 49.2    MD renal dosed Aztreonam but for this pts weight, age and crcl dose was optimized to 1g q8h for UTI. If renal function worsens, dose reduce to 1g q12hr

## 2025-01-16 NOTE — PATIENT INSTRUCTIONS
Thank you so much for choosing me to provide your care today!    If you were dilated your vision may remain blurry   or light sensitive for several hours.    The nature of eye and vision problems can require frequent follow up, please make every effort to adhere to any future appointments.    If you have any issues, questions, or concerns,   please do not hesitate to reach out.    If you receive a survey in regards to your care today, please mention any exceptional care my office staff and/or technicians provided.    You can reach our office at this number:    [phone]    Please consider signing up for and utilizing [employer]!  This is the best way to directly reach me or other  providers

## 2025-02-24 DIAGNOSIS — F41.9 ANXIETY: ICD-10-CM

## 2025-02-24 RX ORDER — ESCITALOPRAM OXALATE 10 MG/1
10 TABLET ORAL DAILY
Qty: 90 TABLET | Refills: 2 | Status: SHIPPED | OUTPATIENT
Start: 2025-02-24

## 2025-03-18 DIAGNOSIS — M25.562 CHRONIC PAIN OF LEFT KNEE: ICD-10-CM

## 2025-03-18 DIAGNOSIS — G89.29 CHRONIC PAIN OF LEFT KNEE: ICD-10-CM

## 2025-03-18 RX ORDER — BACLOFEN 20 MG/1
TABLET ORAL
Qty: 270 TABLET | Refills: 1 | Status: SHIPPED | OUTPATIENT
Start: 2025-03-18

## 2025-04-30 ENCOUNTER — OFFICE VISIT (OUTPATIENT)
Dept: OPHTHALMOLOGY | Facility: CLINIC | Age: 65
End: 2025-04-30
Payer: MEDICARE

## 2025-04-30 DIAGNOSIS — H52.7 REFRACTION ERROR: ICD-10-CM

## 2025-04-30 DIAGNOSIS — H25.813 COMBINED FORMS OF AGE-RELATED CATARACT OF BOTH EYES: Primary | ICD-10-CM

## 2025-04-30 PROCEDURE — 99213 OFFICE O/P EST LOW 20 MIN: CPT | Performed by: OPHTHALMOLOGY

## 2025-04-30 ASSESSMENT — REFRACTION_MANIFEST
OD_SPHERE: -3.00
OD_SPHERE: -2.50
OS_AXIS: 095
OD_AXIS: 085
OD_ADD: +2.50
OD_AXIS: 075
OS_AXIS: 100
METHOD_AUTOREFRACTION: 1
OS_CYLINDER: -2.50
OD_CYLINDER: -2.25
OS_SPHERE: -2.50
OD_CYLINDER: -2.50
OS_ADD: +2.50
OS_SPHERE: -2.25
OS_CYLINDER: -2.00

## 2025-04-30 ASSESSMENT — REFRACTION_WEARINGRX
OD_CYLINDER: -2.25
OD_AXIS: 078
SPECS_TYPE: PAL
OS_ADD: +2.50
OS_CYLINDER: -2.50
OD_SPHERE: -3.00
OS_SPHERE: -2.25
OS_AXIS: 096
OD_ADD: +2.50

## 2025-04-30 ASSESSMENT — ENCOUNTER SYMPTOMS
HEMATOLOGIC/LYMPHATIC NEGATIVE: 0
CARDIOVASCULAR NEGATIVE: 0
GASTROINTESTINAL NEGATIVE: 0
ENDOCRINE NEGATIVE: 0
RESPIRATORY NEGATIVE: 0
PSYCHIATRIC NEGATIVE: 0
EYES NEGATIVE: 0
MUSCULOSKELETAL NEGATIVE: 0
NEUROLOGICAL NEGATIVE: 0
CONSTITUTIONAL NEGATIVE: 0
ALLERGIC/IMMUNOLOGIC NEGATIVE: 0

## 2025-04-30 ASSESSMENT — KERATOMETRY
OS_K2POWER_DIOPTERS: 43.00
OD_K1POWER_DIOPTERS: 44.00
OS_K1POWER_DIOPTERS: 44.50
OD_K2POWER_DIOPTERS: 42.50
OD_AXISANGLE_DEGREES: 85
OD_AXISANGLE2_DEGREES: 175
OS_AXISANGLE_DEGREES: 100
OS_AXISANGLE2_DEGREES: 10

## 2025-04-30 ASSESSMENT — PAIN SCALES - GENERAL: PAINLEVEL_OUTOF10: 0-NO PAIN

## 2025-04-30 ASSESSMENT — VISUAL ACUITY
CORRECTION_TYPE: GLASSES
OD_CC+: -1
OD_CC: 20/20
METHOD: SNELLEN - LINEAR
OS_CC: 20/20

## 2025-04-30 ASSESSMENT — SLIT LAMP EXAM - LIDS
COMMENTS: MEIBOMIAN GLAND DYSFUNCTION
COMMENTS: MEIBOMIAN GLAND DYSFUNCTION

## 2025-04-30 ASSESSMENT — EXTERNAL EXAM - LEFT EYE: OS_EXAM: NORMAL

## 2025-04-30 ASSESSMENT — EXTERNAL EXAM - RIGHT EYE: OD_EXAM: NORMAL

## 2025-04-30 NOTE — PATIENT INSTRUCTIONS
Thank you so much for choosing me to provide your care today!    If you were dilated your vision may remain blurry   or light sensitive for several hours.    The nature of eye and vision problems can require frequent follow up, please make every effort to adhere to any future appointments.    If you have any issues, questions, or concerns,   please do not hesitate to reach out.    If you receive a survey in regards to your care today, please mention any exceptional care my office staff and/or technicians provided.    You can reach our office at this number:    [phone]    Please consider signing up for and utilizing "[employer]!  This is the best way to directly reach me or other  providers

## 2025-04-30 NOTE — ASSESSMENT & PLAN NOTE
Good BCVA in current RX, Refraction performed today for diagnostic purposes only and without specific intent to dispense Rx. Glasses/SCL Rx not dispensed today.

## 2025-04-30 NOTE — PROGRESS NOTES
Assessment/Plan   Problem List Items Addressed This Visit       Combined forms of age-related cataract of both eyes - Primary    Suspect cataract remains most likely etiology for noted vision change. However, vision too good overall to justify the risk for surgery at this time. Advised to monitor symptoms, including glare with night driving. To call if any worsening issues in interim. Can FU for full visit early next year.          Refraction error    Good BCVA in current RX, Refraction performed today for diagnostic purposes only and without specific intent to dispense Rx. Glasses/SCL Rx not dispensed today.                 Provided reassurance regarding above diagnoses and care received in the office visit today. Discussed outcomes and options along with the importance of treatment compliance. Understands the importance of any follow up visits. Patient instructed to call/communicate with our office if any new issues, questions, or concerns.     Will plan to see back in Jan 2026 for full or sooner PRN

## 2025-04-30 NOTE — ASSESSMENT & PLAN NOTE
Suspect cataract remains most likely etiology for noted vision change. However, vision too good overall to justify the risk for surgery at this time. Advised to monitor symptoms, including glare with night driving. To call if any worsening issues in interim. Can FU for full visit early next year.

## 2025-06-12 ENCOUNTER — HOSPITAL ENCOUNTER (OUTPATIENT)
Dept: RADIOLOGY | Facility: CLINIC | Age: 65
Discharge: HOME | End: 2025-06-12
Payer: MEDICARE

## 2025-06-12 ENCOUNTER — APPOINTMENT (OUTPATIENT)
Dept: ORTHOPEDIC SURGERY | Facility: CLINIC | Age: 65
End: 2025-06-12
Payer: MEDICARE

## 2025-06-12 DIAGNOSIS — M76.32 ILIOTIBIAL BAND SYNDROME OF LEFT SIDE: Primary | ICD-10-CM

## 2025-06-12 DIAGNOSIS — Z96.652 S/P TOTAL KNEE ARTHROPLASTY, LEFT: ICD-10-CM

## 2025-06-12 PROCEDURE — 73564 X-RAY EXAM KNEE 4 OR MORE: CPT | Mod: LEFT SIDE | Performed by: RADIOLOGY

## 2025-06-12 PROCEDURE — 73562 X-RAY EXAM OF KNEE 3: CPT | Mod: RT

## 2025-06-12 PROCEDURE — 1159F MED LIST DOCD IN RCRD: CPT | Performed by: ORTHOPAEDIC SURGERY

## 2025-06-12 PROCEDURE — G2211 COMPLEX E/M VISIT ADD ON: HCPCS | Performed by: ORTHOPAEDIC SURGERY

## 2025-06-12 PROCEDURE — 1036F TOBACCO NON-USER: CPT | Performed by: ORTHOPAEDIC SURGERY

## 2025-06-12 PROCEDURE — 1157F ADVNC CARE PLAN IN RCRD: CPT | Performed by: ORTHOPAEDIC SURGERY

## 2025-06-12 PROCEDURE — 1160F RVW MEDS BY RX/DR IN RCRD: CPT | Performed by: ORTHOPAEDIC SURGERY

## 2025-06-12 PROCEDURE — 1125F AMNT PAIN NOTED PAIN PRSNT: CPT | Performed by: ORTHOPAEDIC SURGERY

## 2025-06-12 PROCEDURE — 99214 OFFICE O/P EST MOD 30 MIN: CPT | Performed by: ORTHOPAEDIC SURGERY

## 2025-06-12 PROCEDURE — 73564 X-RAY EXAM KNEE 4 OR MORE: CPT | Mod: LT

## 2025-06-12 PROCEDURE — 73562 X-RAY EXAM OF KNEE 3: CPT | Mod: LEFT SIDE | Performed by: RADIOLOGY

## 2025-06-12 ASSESSMENT — PAIN SCALES - GENERAL: PAINLEVEL_OUTOF10: 1

## 2025-06-12 ASSESSMENT — PAIN - FUNCTIONAL ASSESSMENT: PAIN_FUNCTIONAL_ASSESSMENT: 0-10

## 2025-06-12 NOTE — PROGRESS NOTES
This is a consultation from Dr. Curry Mejia MD for   Chief Complaint   Patient presents with    Left Knee - Follow-up     3/14/24 LT TKA         This is a 64 y.o. male who presents for evaluation of his left knee.  Patient is little over a year out from left total knee, he been doing very well overall.  In the last month he has noticed a new issue which is some pain over the lateral side of his knee.  Initially got worse after he was sitting down and staining his deck.  No drainage from his incision no fevers no chills.  He has tried over-the-counter anti-inflammatories and stretching.    Physical Exam    There has been no interval change in this patient's past medical, surgical, medications, allergies, family history or social history since the most recent visit to a provider within our department. 14 point review of systems was performed, reviewed, and negative except for pertinent positives documented in the history of present illness.     Constitutional: well developed, well nourished male in no acute distress  Psychiatric: normal mood, appropriate affect  Eyes: sclera anicteric  HENT: normocephalic/atraumatic  CV: regular rate and rhythm   Respiratory: non labored breathing  Integumentary: no rash  Neurological: moves all extremities    Left knee examination: Well-healed surgical incision erythema no drainage stable to varus and valgus stress range of motion 0 120 degrees slightly tender over the IT band distally and laterally.    Imaging:  Xrays were ordered by me, they were reviewed and independently interpreted by me today, they show stable left total knee arthroplasty no evidence of any fracture loosening or dislocation    Procedures      Impression/Plan: This is a 64 y.o. male status post left total knee with distal IT band irritation.  I discussed this with him in detail including the clinical radiographic findings treatment options percent benefits.  Recommended initial management PT  anti-inflammatories topical anti-inflammatory stretching massage.  I will see him back for further radiographic follow-up    BMI Readings from Last 1 Encounters:   12/23/24 34.26 kg/m²      Lab Results   Component Value Date    CREATININE 0.86 12/13/2024     Tobacco Use: Low Risk  (6/12/2025)    Patient History     Smoking Tobacco Use: Never     Smokeless Tobacco Use: Never     Passive Exposure: Not on file      Computed MELD 3.0 unavailable. One or more values for this score either were not found within the given timeframe or did not fit some other criterion.  Computed MELD-Na unavailable. One or more values for this score either were not found within the given timeframe or did not fit some other criterion.       Lab Results   Component Value Date    HGBA1C 5.7 (H) 12/13/2024     Lab Results   Component Value Date    STAPHMRSASCR (A) 03/05/2024     Isolated: Methicillin Susceptible Staphylococcus aureus (MSSA)

## 2025-07-10 ENCOUNTER — EVALUATION (OUTPATIENT)
Dept: PHYSICAL THERAPY | Facility: CLINIC | Age: 65
End: 2025-07-10
Payer: MEDICARE

## 2025-07-10 DIAGNOSIS — G89.29 CHRONIC PAIN OF LEFT KNEE: Primary | ICD-10-CM

## 2025-07-10 DIAGNOSIS — M25.562 CHRONIC PAIN OF LEFT KNEE: Primary | ICD-10-CM

## 2025-07-10 PROCEDURE — 97161 PT EVAL LOW COMPLEX 20 MIN: CPT | Mod: GP | Performed by: PHYSICAL THERAPIST

## 2025-07-10 PROCEDURE — 97110 THERAPEUTIC EXERCISES: CPT | Mod: GP | Performed by: PHYSICAL THERAPIST

## 2025-07-10 PROCEDURE — 97140 MANUAL THERAPY 1/> REGIONS: CPT | Mod: GP | Performed by: PHYSICAL THERAPIST

## 2025-07-10 ASSESSMENT — PAIN - FUNCTIONAL ASSESSMENT: PAIN_FUNCTIONAL_ASSESSMENT: 0-10

## 2025-07-10 ASSESSMENT — PAIN SCALES - GENERAL: PAINLEVEL_OUTOF10: 0 - NO PAIN

## 2025-07-10 NOTE — PROGRESS NOTES
Physical Therapy  Physical Therapy Orthopedic Evaluation      Patient Name: Leonel Ceballos  MRN: [id number]  Today's Date: 7/10/2025  Time Calculation  Start Time: 0945  Stop Time: 1030  Time Calculation (min): 45 min  PT Evaluation Time Entry  PT Evaluation (Low) Time Entry: 20  PT Therapeutic Procedures Time Entry  Therapeutic Exercise Time Entry: 15  Manual Therapy Time Entry: 9       Insurance:    Number of Treatments Authorized: 1 of ? (Auth)  Certification Period Start Date: 07/10/25  Certification Period End Date: 08/21/25  Insurance Type: Payor: [employer] / Plan: [employer]ARE / Product Type: *No Product type* /     Current Problem  Problem List Items Addressed This Visit           ICD-10-CM    Chronic pain of left knee - Primary M25.562, G89.29    Relevant Orders    Follow Up In Physical Therapy       General:  Reason for Referral: L knee pain  Referred By: Dr. Gabriele Pino    Past Medical History  Past Medical History Relevant to Rehab: L TKA 3/14/2024; R TKA 9/11/2023    Precautions:   Rehab Fall Risk: Low: Age 65 or older  This patient is identified as a low fall risk based on the highest level factors present.    Outpatient Rehab Fall Risk Interventions:  Low Fall Risk Interventions: Low Fall Risk Interventions: Optimize clinic environment ensuring safe and accessible pathways  Medical History Form: Reviewed (scanned into chart)    Subjective:   Subjective   General Comment: Patient presents with lateral L knee pain possibly from staining his deck in May 2025. He notes that he is unsure of an exact ANGIE but pain began shortly after working for ~4 days. The symptoms have become intermittent in nature but there can be periods where is has no symptoms for ~7 days consecutively.    Onset Date: Onset Date: 05/01/25    Current Condition:   Better    Prior Functional Level: Prior Function Per Pt/Caregiver Report  Level of [place]: Independent with ADLs and functional  "transfers  Vocational: Retired    Pain:  Pain Assessment: 0-10  0-10 (Numeric) Pain Score: 0 - No pain  Pain Location: Knee (#1 (I,V): L lateral knee along proximal fibular head/IT band insertion, 0-4/10, \"pain\")  Effect of Pain on Daily Activities: Symptoms are random with ADL's but    Previous Interventions/Treatments/Previous Tests & Imaging: None Comment: Medical Management:    Patients Living Environment: Home Living Comment: Multi-Story Home    Primary Language: English    Patient's Goal(s) for Therapy: Reduce pain to return to ADLs prior functional level    Red Flags: Do you have any of the following?         Red Flags: None    Objective:  Objective   KNEE    Observation  Observation Comment: No swelling or edema noted  Knee Palpation/Joint Mobility  Palpation/Joint Mobility Comment: Tenderness to palpation distal L BF and short head BF (#1), slight tenderness #1; Slight movement of knee joint but no pain noted  Knee AROM  R knee flexion: (140°): 125°  L knee flexion: (140°): 131°  R knee extension: (0°): 0°  L knee extension: (0°): 0°  Knee PROM NT this visit     Knee MMT  R knee flexion: (5/5): 5/5  L knee flexion: (5/5): 4+/5 (#1)  R knee extension: (5/5): 5/5  L knee extension: (5/5): 4+/5  DTR NT this visit     Special Tests NT this visit     Gait  Gait Comment: No gait deviations noted while in clinic  Flexibility  R hamstrings: Moderate Restrictions  L hamstrings: Moderate Restrictions              Outcome Measures:  Other Measures  Lower Extremity Funtional Score (LEFS): 63     Treatment Performed:  Therapeutic Exercise  Therapeutic Exercise Activity 1: HEP Education and demonstration with sets and reps as noted. Pt with good understanding and demonstration.    Manual Therapy  Manual Therapy Activity 1: STM L distal hamstring - biceps femoris      Outpatient Education  Individual(s) Educated: Patient  Education Provided: Home Exercise Program  Patient/Caregiver Demonstrated Understanding: " yes  Education Comment: Access Code: [email or web address]  URL: [email or web address]  Date: 07/10/2025  Prepared by: Osei Patton    Exercises  - Side Stepping with Resistance at Ankles  - 1 x daily - 4 x weekly - 3 sets - 10 reps  - Clamshell with Resistance (Mirrored)  - 1 x daily - 4 x weekly - 3 sets - 10 reps  - Forward Monster Walks  - 1 x daily - 4 x weekly - 3 sets - 10 reps  - Single Leg Bridge (Mirrored)  - 1 x daily - 4 x weekly - 2 sets - 10 reps  - Single Leg Hamstring Curl with Weight Machine  - 1 x daily - 4 x weekly - 2-3 sets - 10 reps    Assessment:   Patient is 64 y.o. year old who presents to physical therapy with signs and symptoms consistent with L posterior lateral knee pain consistent with left lateral hamstring involvement. Patient has decreased ROM and strength limiting functional mobility and ADLs. Patient would benefit from skilled physical therapy in order to address the stated deficits and return to daily tasks with reduced pain and improved function.  Emphasis of treatment will be focusing on hamstring strength and soft tissue mobility to assist with reducing symptoms and pain to return to ADLs and functional tasks prior functional level.  If no change occurs will look to modify plan.    Personal Factors Affecting Care:   None    SINSS:  Severity: Low  Irritability: Low  Nature: MSK L knee  Stage: Sub-Acute  Stability: Stable and/or uncomplicated characteristics    Rehab Prognosis: Good      Plan:  Treatment/Interventions: Electrical stimulation, Education/ Instruction, Dry needling, Neuromuscular re-education, Self care/ home management, Therapeutic activities, Therapeutic exercises, Manual therapy  PT Plan: Skilled PT  PT Frequency: 1 time per week (every 2-3 weeks)  Duration: 6 weeks  Onset Date: 05/01/25  Rehab Potential: Good    Goals: Set and discussed today  Active       PT Problem       STG       Start:  07/10/25    Expected End:  07/31/25       1) Patient will  improve LEFS score by 9 points in order to perform functional activities at home and in the community.  2) Patient will be able to complete ADLs with pain in knee less than 6/10.  3) Patient will be independent with HEP to allow for continued improvement in daily tasks at home and in the community in 3 visits.              LTG       Start:  07/10/25    Expected End:  08/21/25       1) Patient will have <10% asymmetry in knee musculature to aid in balance with ambulation on varied surfaces in community.  2) Patient will be able to perform proper squatting technique in order to reduce compression on knee and prevent increased pain with daily tasks.  3) Patient will be able to perform >30 seconds of SLS on even ground in order to allow for safe ambulation on all levels and to reduce fall risk within the community.  4) Patient will improve LEFS score >/=70/80 points in order to perform functional activities at home and in the community by discharge.               Plan of care was developed with input and agreement by the patient        Osei Patton PT    Ambulatory Screening Summary      Screening  Frequency  Date Last Completed   Spiritual and Cultural Beliefs   Screening  each visit or episode of care 4/30/2025   Falls Risk Screening  every ambulatory visit    Pain Screening  annually at primary care visit  4/30/2025   Domestic Violence screening  annually at primary care visit 4/30/2025   Elder Abuse Screening  annually at primary care visit 9/6/2024   Depression Screening  annually in the primary care setting 4/30/2025   Suicide Risk Screening  annually in the primary care setting 3/14/2024   Nutrition and Food Insecurity   Screening  at least annually at primary care visit     Key Learner  annually in the primary care setting 4/30/2025   Drug Screen  6/12/2025  8:28 AM   Alcohol Screen  6/12/2025  8:28 AM   Advance Directive  4/30/2025           This note was dictated with voice recognition software. It has not  been proofread for grammatical errors, typographical mistakes or other semantic inconsistencies.

## 2025-07-29 ENCOUNTER — APPOINTMENT (OUTPATIENT)
Dept: OPHTHALMOLOGY | Facility: CLINIC | Age: 65
End: 2025-07-29
Payer: MEDICARE

## 2025-07-31 ENCOUNTER — APPOINTMENT (OUTPATIENT)
Dept: PHYSICAL THERAPY | Facility: CLINIC | Age: 65
End: 2025-07-31
Payer: MEDICARE

## 2025-07-31 DIAGNOSIS — G89.29 CHRONIC PAIN OF LEFT KNEE: Primary | ICD-10-CM

## 2025-07-31 DIAGNOSIS — M25.562 CHRONIC PAIN OF LEFT KNEE: Primary | ICD-10-CM

## 2025-09-06 ENCOUNTER — OFFICE VISIT (OUTPATIENT)
Dept: URGENT CARE | Age: 65
End: 2025-09-06
Payer: MEDICARE

## 2025-09-06 VITALS
DIASTOLIC BLOOD PRESSURE: 68 MMHG | BODY MASS INDEX: 32.88 KG/M2 | HEART RATE: 58 BPM | TEMPERATURE: 97.4 F | OXYGEN SATURATION: 99 % | HEIGHT: 69 IN | RESPIRATION RATE: 18 BRPM | SYSTOLIC BLOOD PRESSURE: 144 MMHG | WEIGHT: 222 LBS

## 2025-09-06 DIAGNOSIS — M70.21 OLECRANON BURSITIS OF RIGHT ELBOW: Primary | ICD-10-CM

## 2025-09-06 RX ORDER — METHYLPREDNISOLONE 4 MG/1
TABLET ORAL
Qty: 21 TABLET | Refills: 0 | Status: SHIPPED | OUTPATIENT
Start: 2025-09-06 | End: 2025-09-12

## 2026-01-22 ENCOUNTER — APPOINTMENT (OUTPATIENT)
Dept: OPHTHALMOLOGY | Facility: CLINIC | Age: 66
End: 2026-01-22
Payer: MEDICARE

## (undated) DEVICE — BLADE, RECIPROCATING, LARGE, 12.7MM

## (undated) DEVICE — SPONGE, LAP, XRAY DECT, 18IN X 18IN, W/MASTER DMT, STERILE

## (undated) DEVICE — SYRINGE, 20 CC, LUER LOCK

## (undated) DEVICE — NEEDLE, SPINAL, QUINCKE, 18 G X 3.5 IN, PINK HUB

## (undated) DEVICE — Device

## (undated) DEVICE — APPLICATOR, CHLORAPREP, W/ORANGE TINT, 26ML

## (undated) DEVICE — SYRINGE, 60 CC, LUER LOCK, MONOJECT

## (undated) DEVICE — PAD, KNEE PATIENT, DEMAYO, DISP, STERILE

## (undated) DEVICE — SUTURE, V-LOC, 3-0, 18IN, P-12

## (undated) DEVICE — HOOD, STERISHIELD T4 SYSTEM

## (undated) DEVICE — SKIN CLOSURE SYS, PREMIERPRO EXOFIN, 1-4CM X 22CM, 1.75G TUBE

## (undated) DEVICE — GLOVE, SURGICAL, PROTEXIS PI ORTHO, 8.0, PF, LF

## (undated) DEVICE — IRRIGATION SYSTEM, WOUND, PULSAVAC PLUS

## (undated) DEVICE — SUTURE, VICRYL, 1, 24 IN, CTD, UNDYED

## (undated) DEVICE — DRESSING, MEPILEX BORDER, POST-OP AG, 4 X 10 IN

## (undated) DEVICE — BANDAGE, ELASTIC,  6 IN X 11 YDS, STERILE, LF

## (undated) DEVICE — SUTURE, CTD, VICRYL, 2-0, UND, BR, CT-2

## (undated) DEVICE — MIXER, CEMENT, MIXEVAC III HIGH VACUUM KIT, STERILE

## (undated) DEVICE — DRESSING, GAUZE, SPONGE, KERLIX, SUPER, 6 X 6.75 IN, STERILE 10PK

## (undated) DEVICE — WOUND SYSTEM, DEBRIDEMENT & CLEANING, O.R DUOPAK

## (undated) DEVICE — SOLUTION, IRRIGATION, USP, SODIUM CHLORIDE 0.9%, 3000 ML, BAG

## (undated) DEVICE — DRAPE, INSTRUMENT, W/POUCH, STERI DRAPE, 7 X 11 IN, DISPOSABLE, STERILE

## (undated) DEVICE — GLOVE, SURGICAL, PROTEXIS PI BLUE W/NEUTHERA, 8.0, PF, LF

## (undated) DEVICE — DRAPE, SHEET, 17 X 23 IN